# Patient Record
Sex: FEMALE | Race: WHITE | NOT HISPANIC OR LATINO | Employment: OTHER | URBAN - METROPOLITAN AREA
[De-identification: names, ages, dates, MRNs, and addresses within clinical notes are randomized per-mention and may not be internally consistent; named-entity substitution may affect disease eponyms.]

---

## 2017-03-24 ENCOUNTER — OFFICE VISIT (OUTPATIENT)
Dept: FAMILY MEDICINE | Facility: CLINIC | Age: 62
End: 2017-03-24
Payer: COMMERCIAL

## 2017-03-24 ENCOUNTER — RADIANT APPOINTMENT (OUTPATIENT)
Dept: GENERAL RADIOLOGY | Facility: CLINIC | Age: 62
End: 2017-03-24
Attending: FAMILY MEDICINE
Payer: COMMERCIAL

## 2017-03-24 VITALS
SYSTOLIC BLOOD PRESSURE: 136 MMHG | HEART RATE: 68 BPM | BODY MASS INDEX: 36.89 KG/M2 | HEIGHT: 63 IN | WEIGHT: 208.2 LBS | TEMPERATURE: 97 F | DIASTOLIC BLOOD PRESSURE: 94 MMHG

## 2017-03-24 DIAGNOSIS — R73.09 ABNORMAL GLUCOSE: ICD-10-CM

## 2017-03-24 DIAGNOSIS — M25.551 HIP PAIN, RIGHT: Primary | ICD-10-CM

## 2017-03-24 DIAGNOSIS — I10 ESSENTIAL HYPERTENSION WITH GOAL BLOOD PRESSURE LESS THAN 140/90: ICD-10-CM

## 2017-03-24 DIAGNOSIS — M25.551 HIP PAIN, RIGHT: ICD-10-CM

## 2017-03-24 DIAGNOSIS — E78.5 HYPERLIPIDEMIA LDL GOAL <130: ICD-10-CM

## 2017-03-24 PROCEDURE — 73502 X-RAY EXAM HIP UNI 2-3 VIEWS: CPT

## 2017-03-24 PROCEDURE — 99203 OFFICE O/P NEW LOW 30 MIN: CPT | Performed by: FAMILY MEDICINE

## 2017-03-24 RX ORDER — HYDROCHLOROTHIAZIDE 25 MG/1
TABLET ORAL
COMMUNITY
Start: 2017-03-08 | End: 2018-05-17

## 2017-03-24 RX ORDER — INFLUENZA VIRUS VACCINE 15; 15; 15; 15 UG/.5ML; UG/.5ML; UG/.5ML; UG/.5ML
SUSPENSION INTRAMUSCULAR
Refills: 0 | COMMUNITY
Start: 2016-11-05 | End: 2018-05-17

## 2017-03-24 RX ORDER — SERTRALINE HYDROCHLORIDE 100 MG/1
300 TABLET, FILM COATED ORAL
COMMUNITY
Start: 2016-02-19 | End: 2017-05-26

## 2017-03-24 RX ORDER — SIMVASTATIN 40 MG
TABLET ORAL
COMMUNITY
Start: 2017-03-13 | End: 2018-05-17

## 2017-03-24 NOTE — MR AVS SNAPSHOT
After Visit Summary   3/24/2017    Dee Dee Shepherd    MRN: [id number]           Patient Information     Date Of Birth          1955        Visit Information        Provider Department      3/24/2017 3:20 PM Dalia Wu DO [facility]        Today's Diagnoses     Hip pain, right    -  1    Essential hypertension with goal blood pressure less than 140/90        Hyperlipidemia LDL goal <130        Abnormal glucose          Care Instructions    I suspect you have 2 separate hip problems, the trochanteric bursitis on the outside and the arthritis in the groin.  Please check out the exercises for the bursitis.  You can also take some tylenol as needed for pain.  Make sure to not take more then 3000mg of tylenol in a 24 hour period (your Excedrin PM has some tylenol in it too)    If you feel things are not improving over the next few weeks and you are ready to take another step just let me know and we can get you in with sports medicine to consider an injection.    Please plan on coming back for blood work and a blood pressure check.  You will need to be fasting for 12 hours (nothing but water) prior to your blood work.  I'll let you know your results through the [facility]    Nice meeting you today!        Follow-ups after your visit        Future tests that were ordered for you today     Open Future Orders        Priority Expected Expires Ordered    Hemoglobin A1c Routine 3/27/2017 3/24/2018 3/24/2017    Comprehensive metabolic panel Routine 3/27/2017 3/24/2018 3/24/2017    Lipid Profile with reflex to direct LDL Routine 3/27/2017 3/24/2018 3/24/2017            Who to contact     Normal or non-critical lab and imaging results will be communicated to you by MyChart, letter or phone within 4 business days after the clinic has received the results. If you do not hear from us within 7 days, please contact the clinic through [facility] or phone. If you have a critical or abnormal lab  "result, we will notify you by phone as soon as possible.  Submit refill requests through [facility] or call your pharmacy and they will forward the refill request to us. Please allow 3 business days for your refill to be completed.          If you need to speak with a  for additional information , please call: [phone]           Additional Information About Your Visit        [facility] Information     [facility] gives you secure access to your electronic health record. If you see a primary care provider, you can also send messages to your care team and make appointments. If you have questions, please call your primary care clinic.  If you do not have a primary care provider, please call [phone] and they will assist you.        Care EveryWhere ID     This is your Care EveryWhere ID. This could be used by other organizations to access your [facility] medical records  [id number]        Your Vitals Were     Pulse Temperature Height BMI (Body Mass Index)          68 97  F (36.1  C) (Tympanic) 5' 3.19\" (1.605 m) 36.66 kg/m2         Blood Pressure from Last 3 Encounters:   03/24/17 (!) 136/94    Weight from Last 3 Encounters:   03/24/17 208 lb 3.2 oz (94.4 kg)               Primary Care Provider    None Specified       No primary provider on file.        Thank you!     Thank you for choosing [facility]  for your care. Our goal is always to provide you with excellent care. Hearing back from our patients is one way we can continue to improve our services. Please take a few minutes to complete the written survey that you may receive in the mail after your visit with us. Thank you!             Your Updated Medication List - Protect others around you: Learn how to safely use, store and throw away your medicines at www.disposemymeds.org.          This list is accurate as of: 3/24/17  4:25 PM.  Always use your most recent med list.                   Brand Name Dispense Instructions for use    " Diphenhydramine-APAP (sleep)  MG Tabs          FLUARIX QUADRIVALENT 0.5 ML injection   Generic drug:  influenza quadrivalent (PF) vacc age 3 yrs and older      TO BE ADMINISTERED BY PHARMACIST FOR IMMUNIZATION       hydrochlorothiazide 25 MG tablet    HYDRODIURIL     TAKE 1 TABLET DAILY (EVERY 24 HOURS)       sertraline 100 MG tablet    ZOLOFT     Take 300 mg by mouth       simvastatin 40 MG tablet    ZOCOR     TAKE 1 TABLET NIGHTLY

## 2017-03-24 NOTE — PROGRESS NOTES
SUBJECTIVE:                                                    Dee Dee Shepherd is a 61 year old female who presents to clinic today for the following health issues:    New Patient/Transfer of Care    Joint Pain     Onset: 6 months or more    Description:   Location: right hip  Character: Sharp    Intensity: moderate    Progression of Symptoms: same    Accompanying Signs & Symptoms:  Other symptoms: only occurs with movement   History:   Previous similar pain: no       Precipitating factors:   Trauma or overuse: no     Alleviating factors:  Improved by: rest/inactivity       Therapies Tried and outcome: None    Tender in the lateral hip.  Hurts to get out of car, roll over in bed, and with sit to stand transitions.   Has some gorin pain as well.  Feels stiff when she gets up to walk around.  No injury to her hip, no falls.  No prior hip problems  No pain below the knee.  No significant back pain.    [facility] at [facility], her doctor left.  Looking to transfer care.    Has a h/o HTN and has had elevated blood sugars in the past.          Problem list and histories reviewed & adjusted, as indicated.  Additional history: none      Reviewed and updated as needed this visit by clinical staff  Tobacco  Allergies  Meds  Med Hx  Surg Hx  Fam Hx  Soc Hx         Reviewed and updated as needed this visit by Provider  Tobacco  Med Hx  Surg Hx  Fam Hx  Soc Hx        ROS: Remainder of Constitutional, CV, Respiratory, GI,  negative with exception of that mentioned above    PE:  VS as above   Gen:  WN/WD/WH female in NAD   Heart:  RRR without murmur, nl S1, S2, no rubs or gallops   Lungs CTA ella without rales/ronchi/wheezes   Ext:  No pedal edema   MSK:  R hip with decreased ROM in all planes.  Groin and lateral pain on end range flexion, tender on palpation over the greater trochanter    X-ray hip:  No fracture per my visualization, positive for arthritis changes.    A/P:      ICD-10-CM    1. Hip pain,  right M25.551 CANCELED: XR Pelvis 1/2 Views   2. Essential hypertension with goal blood pressure less than 140/90 I10 Comprehensive metabolic panel   3. Hyperlipidemia LDL goal <130 E78.5 Comprehensive metabolic panel     Lipid Profile with reflex to direct LDL   4. Abnormal glucose R73.09 Hemoglobin A1c     Offered PT, pt prefers home exercises.  Reviewed 2 different hip problems, pt seems most bothered by lateral hip pain at this point    Reviewed need for BP check, pt will stop into pharmacy for this in the next week or so.    Patient Instructions   I suspect you have 2 separate hip problems, the trochanteric bursitis on the outside and the arthritis in the groin.  Please check out the exercises for the bursitis.  You can also take some tylenol as needed for pain.  Make sure to not take more then 3000mg of tylenol in a 24 hour period (your Excedrin PM has some tylenol in it too)    If you feel things are not improving over the next few weeks and you are ready to take another step just let me know and we can get you in with sports medicine to consider an injection.    Please plan on coming back for blood work and a blood pressure check.  You will need to be fasting for 12 hours (nothing but water) prior to your blood work.  I'll let you know your results through the [facility]    Nice meeting you today!

## 2017-03-24 NOTE — NURSING NOTE
"Chief Complaint   Patient presents with     Musculoskeletal Problem     right leg       Initial BP (!) 136/94 (BP Location: Left arm, Cuff Size: Adult Regular)  Pulse 68  Temp 97  F (36.1  C) (Tympanic)  Ht 5' 3.19\" (1.605 m)  Wt 208 lb 3.2 oz (94.4 kg)  BMI 36.66 kg/m2 Estimated body mass index is 36.66 kg/(m^2) as calculated from the following:    Height as of this encounter: 5' 3.19\" (1.605 m).    Weight as of this encounter: 208 lb 3.2 oz (94.4 kg).  Medication Reconciliation: complete     Arlet Caicedo CMA       "

## 2017-03-24 NOTE — PATIENT INSTRUCTIONS
I suspect you have 2 separate hip problems, the trochanteric bursitis on the outside and the arthritis in the groin.  Please check out the exercises for the bursitis.  You can also take some tylenol as needed for pain.  Make sure to not take more then 3000mg of tylenol in a 24 hour period (your Excedrin PM has some tylenol in it too)    If you feel things are not improving over the next few weeks and you are ready to take another step just let me know and we can get you in with sports medicine to consider an injection.    Please plan on coming back for blood work and a blood pressure check.  You will need to be fasting for 12 hours (nothing but water) prior to your blood work.  I'll let you know your results through the [facility]    Nice meeting you today!

## 2017-05-24 DIAGNOSIS — R73.09 ABNORMAL GLUCOSE: ICD-10-CM

## 2017-05-24 DIAGNOSIS — I10 ESSENTIAL HYPERTENSION WITH GOAL BLOOD PRESSURE LESS THAN 140/90: ICD-10-CM

## 2017-05-24 DIAGNOSIS — E78.5 HYPERLIPIDEMIA LDL GOAL <130: ICD-10-CM

## 2017-05-24 LAB
ALBUMIN SERPL-MCNC: 3.7 G/DL (ref 3.4–5)
ALP SERPL-CCNC: 114 U/L (ref 40–150)
ALT SERPL W P-5'-P-CCNC: 19 U/L (ref 0–50)
ANION GAP SERPL CALCULATED.3IONS-SCNC: 9 MMOL/L (ref 3–14)
AST SERPL W P-5'-P-CCNC: 18 U/L (ref 0–45)
BILIRUB SERPL-MCNC: 0.3 MG/DL (ref 0.2–1.3)
BUN SERPL-MCNC: 17 MG/DL (ref 7–30)
CALCIUM SERPL-MCNC: 9.1 MG/DL (ref 8.5–10.1)
CHLORIDE SERPL-SCNC: 104 MMOL/L (ref 94–109)
CHOLEST SERPL-MCNC: 168 MG/DL
CO2 SERPL-SCNC: 27 MMOL/L (ref 20–32)
CREAT SERPL-MCNC: 0.73 MG/DL (ref 0.52–1.04)
GFR SERPL CREATININE-BSD FRML MDRD: 81 ML/MIN/1.7M2
GLUCOSE SERPL-MCNC: 120 MG/DL (ref 70–99)
HBA1C MFR BLD: 5.6 % (ref 4.3–6)
HDLC SERPL-MCNC: 50 MG/DL
LDLC SERPL CALC-MCNC: 95 MG/DL
NONHDLC SERPL-MCNC: 118 MG/DL
POTASSIUM SERPL-SCNC: 3.4 MMOL/L (ref 3.4–5.3)
PROT SERPL-MCNC: 7 G/DL (ref 6.8–8.8)
SODIUM SERPL-SCNC: 140 MMOL/L (ref 133–144)
TRIGL SERPL-MCNC: 114 MG/DL

## 2017-05-24 PROCEDURE — 80061 LIPID PANEL: CPT | Performed by: FAMILY MEDICINE

## 2017-05-24 PROCEDURE — 80053 COMPREHEN METABOLIC PANEL: CPT | Performed by: FAMILY MEDICINE

## 2017-05-24 PROCEDURE — 83036 HEMOGLOBIN GLYCOSYLATED A1C: CPT | Performed by: FAMILY MEDICINE

## 2017-05-24 PROCEDURE — 36415 COLL VENOUS BLD VENIPUNCTURE: CPT | Performed by: FAMILY MEDICINE

## 2017-05-26 ENCOUNTER — TELEPHONE (OUTPATIENT)
Dept: FAMILY MEDICINE | Facility: CLINIC | Age: 62
End: 2017-05-26

## 2017-05-26 DIAGNOSIS — F32.5 MAJOR DEPRESSION IN COMPLETE REMISSION (H): Primary | ICD-10-CM

## 2017-05-26 RX ORDER — SERTRALINE HYDROCHLORIDE 100 MG/1
300 TABLET, FILM COATED ORAL DAILY
Qty: 270 TABLET | Refills: 1 | Status: SHIPPED | OUTPATIENT
Start: 2017-05-26 | End: 2017-07-11

## 2017-05-26 NOTE — TELEPHONE ENCOUNTER
Patient came into clinic for a refill on her zoloft.  She did her blood work and would like to know if she could get her refill today because she is out.  Please send to [facility].    Thank you  Janice Walden, [facility]

## 2017-06-17 ENCOUNTER — HEALTH MAINTENANCE LETTER (OUTPATIENT)
Age: 62
End: 2017-06-17

## 2017-07-07 ENCOUNTER — TELEPHONE (OUTPATIENT)
Dept: FAMILY MEDICINE | Facility: CLINIC | Age: 62
End: 2017-07-07

## 2017-07-07 DIAGNOSIS — F32.5 MAJOR DEPRESSION IN COMPLETE REMISSION (H): ICD-10-CM

## 2017-07-07 NOTE — TELEPHONE ENCOUNTER
Pt called requesting refill of Sertraline sent to  pharmacy. Please triage    Serena Frye, [place]

## 2017-07-11 RX ORDER — SERTRALINE HYDROCHLORIDE 100 MG/1
300 TABLET, FILM COATED ORAL DAILY
Qty: 270 TABLET | Refills: 0 | Status: SHIPPED | OUTPATIENT
Start: 2017-07-11 | End: 2017-10-20

## 2017-07-11 NOTE — TELEPHONE ENCOUNTER
Prescription approved per [facility] Refill Protocol. But needs PHQ9 Rachel Andino RN

## 2017-10-20 DIAGNOSIS — F32.5 MAJOR DEPRESSION IN COMPLETE REMISSION (H): ICD-10-CM

## 2017-10-20 NOTE — LETTER
My Depression Action Plan  Name: Dee Dee Shepherd   Date of Birth 1955  Date: 10/30/2017    My doctor: Dalia Wu   My clinic: [address] [address] [phone]          GREEN    ZONE   Good Control    What it looks like:     Things are going generally well. You have normal up s and down s. You may even feel depressed from time to time, but bad moods usually last less than a day.   What you need to do:  1. Continue to care for yourself (see self care plan)  2. Check your depression survival kit and update it as needed  3. Follow your physician s recommendations including any medication.  4. Do not stop taking medication unless you consult with your physician first.           YELLOW         ZONE Getting Worse    What it looks like:     Depression is starting to interfere with your life.     It may be hard to get out of bed; you may be starting to isolate yourself from others.    Symptoms of depression are starting to last most all day and this has happened for several days.     You may have suicidal thoughts but they are not constant.   What you need to do:     1. Call your care team, your response to treatment will improve if you keep your care team informed of your progress. Yellow periods are signs an adjustment may need to be made.     2. Continue your self-care, even if you have to fake it!    3. Talk to someone in your support network    4. Open up your depression survival kit           RED    ZONE Medical Alert - Get Help    What it looks like:     Depression is seriously interfering with your life.     You may experience these or other symptoms: You can t get out of bed most days, can t work or engage in other necessary activities, you have trouble taking care of basic hygiene, or basic responsibilities, thoughts of suicide or death that will not go away, self-injurious behavior.     What you need to do:  1. Call your care team  and request a same-day appointment. If they are not available (weekends or after hours) call your local crisis line, emergency room or 911.      Electronically signed by: Ludy Trevino, October 30, 2017    Depression Self Care Plan / Survival Kit    Self-Care for Depression  Here s the deal. Your body and mind are really not as separate as most people think.  What you do and think affects how you feel and how you feel influences what you do and think. This means if you do things that people who feel good do, it will help you feel better.  Sometimes this is all it takes.  There is also a place for medication and therapy depending on how severe your depression is, so be sure to consult with your medical provider and/ or Behavioral Health Consultant if your symptoms are worsening or not improving.     In order to better manage my stress, I will:    Exercise  Get some form of exercise, every day. This will help reduce pain and release endorphins, the  feel good  chemicals in your brain. This is almost as good as taking antidepressants!  This is not the same as joining a gym and then never going! (they count on that by the way ) It can be as simple as just going for a walk or doing some gardening, anything that will get you moving.      Hygiene   Maintain good hygiene (Get out of bed in the morning, Make your bed, Brush your teeth, Take a shower, and Get dressed like you were going to work, even if you are unemployed).  If your clothes don't fit try to get ones that do.    Diet  I will strive to eat foods that are good for me, drink plenty of water, and avoid excessive sugar, caffeine, alcohol, and other mood-altering substances.  Some foods that are helpful in depression are: complex carbohydrates, B vitamins, flaxseed, fish or fish oil, fresh fruits and vegetables.    Psychotherapy  I agree to participate in Individual Therapy (if recommended).    Medication  If prescribed medications, I agree to take them.  Missing  doses can result in serious side effects.  I understand that drinking alcohol, or other illicit drug use, may cause potential side effects.  I will not stop my medication abruptly without first discussing it with my provider.    Staying Connected With Others  I will stay in touch with my friends, family members, and my primary care provider/team.    Use your imagination  Be creative.  We all have a creative side; it doesn t matter if it s oil painting, sand castles, or mud pies! This will also kick up the endorphins.    Witness Beauty  (AKA stop and smell the roses) Take a look outside, even in mid-winter. Notice colors, textures. Watch the squirrels and birds.     Service to others  Be of service to others.  There is always someone else in need.  By helping others we can  get out of ourselves  and remember the really important things.  This also provides opportunities for practicing all the other parts of the program.    Humor  Laugh and be silly!  Adjust your TV habits for less news and crime-drama and more comedy.    Control your stress  Try breathing deep, massage therapy, biofeedback, and meditation. Find time to relax each day.     My support system    Clinic Contact:  Phone number:    Contact 1:  Phone number:    Contact 2:  Phone number:    [employer]/:  Phone number:    Therapist:  Phone number:    Local crisis center:    Phone number:    Other community support:  Phone number:

## 2017-10-20 NOTE — TELEPHONE ENCOUNTER
SERTRALINE     Last Written Prescription Date: 07/11/17  Last Fill Quantity: 270, # refills: 0  Last Office Visit with [facility] primary care provider:  03/24/17 ERICK        Last PHQ-9 score on record= No flowsheet data found.

## 2017-10-27 RX ORDER — SERTRALINE HYDROCHLORIDE 100 MG/1
300 TABLET, FILM COATED ORAL DAILY
Qty: 90 TABLET | Refills: 0 | Status: SHIPPED | OUTPATIENT
Start: 2017-10-27 | End: 2017-11-28

## 2017-10-27 NOTE — TELEPHONE ENCOUNTER
Routing refill request to provider for review/approval because:  Patient needs to be seen because:  Overdue for 6 month Depression office visit and PHQ-9.   Sent patient PHQ-9 via My Chart and asked her to please schedule an office visit ASAP.    Radha Catalan RN

## 2017-11-28 DIAGNOSIS — F32.5 MAJOR DEPRESSION IN COMPLETE REMISSION (H): ICD-10-CM

## 2017-11-29 NOTE — TELEPHONE ENCOUNTER
Routing refill request to provider for review/approval because:  Due for PHQ-9. Sent to patient via E Mail. Rachel Andino RN

## 2017-11-30 RX ORDER — SERTRALINE HYDROCHLORIDE 100 MG/1
TABLET, FILM COATED ORAL
Qty: 90 TABLET | Refills: 0 | Status: SHIPPED | OUTPATIENT
Start: 2017-11-30 | End: 2018-05-17

## 2018-05-14 ENCOUNTER — TELEPHONE (OUTPATIENT)
Dept: LAB | Facility: CLINIC | Age: 63
End: 2018-05-14

## 2018-05-14 DIAGNOSIS — E78.5 HYPERLIPIDEMIA LDL GOAL <130: ICD-10-CM

## 2018-05-14 DIAGNOSIS — R73.09 ABNORMAL GLUCOSE: ICD-10-CM

## 2018-05-14 DIAGNOSIS — I10 ESSENTIAL HYPERTENSION WITH GOAL BLOOD PRESSURE LESS THAN 140/90: Primary | ICD-10-CM

## 2018-05-14 NOTE — TELEPHONE ENCOUNTER
Patient coming in for lab work on Tuesday, 5/15/18.     Patient will be fasting.           Please place future orders. Thanks

## 2018-05-15 DIAGNOSIS — R73.09 ABNORMAL GLUCOSE: ICD-10-CM

## 2018-05-15 DIAGNOSIS — E78.5 HYPERLIPIDEMIA LDL GOAL <130: ICD-10-CM

## 2018-05-15 DIAGNOSIS — I10 ESSENTIAL HYPERTENSION WITH GOAL BLOOD PRESSURE LESS THAN 140/90: ICD-10-CM

## 2018-05-15 LAB
ALBUMIN SERPL-MCNC: 3.7 G/DL (ref 3.4–5)
ALP SERPL-CCNC: 119 U/L (ref 40–150)
ALT SERPL W P-5'-P-CCNC: 26 U/L (ref 0–50)
ANION GAP SERPL CALCULATED.3IONS-SCNC: 7 MMOL/L (ref 3–14)
AST SERPL W P-5'-P-CCNC: 19 U/L (ref 0–45)
BILIRUB SERPL-MCNC: 0.3 MG/DL (ref 0.2–1.3)
BUN SERPL-MCNC: 17 MG/DL (ref 7–30)
CALCIUM SERPL-MCNC: 8.6 MG/DL (ref 8.5–10.1)
CHLORIDE SERPL-SCNC: 107 MMOL/L (ref 94–109)
CHOLEST SERPL-MCNC: 229 MG/DL
CO2 SERPL-SCNC: 25 MMOL/L (ref 20–32)
CREAT SERPL-MCNC: 0.74 MG/DL (ref 0.52–1.04)
GFR SERPL CREATININE-BSD FRML MDRD: 78 ML/MIN/1.7M2
GLUCOSE SERPL-MCNC: 116 MG/DL (ref 70–99)
HBA1C MFR BLD: 5.9 % (ref 0–5.6)
HDLC SERPL-MCNC: 54 MG/DL
LDLC SERPL CALC-MCNC: 153 MG/DL
NONHDLC SERPL-MCNC: 175 MG/DL
POTASSIUM SERPL-SCNC: 3.9 MMOL/L (ref 3.4–5.3)
PROT SERPL-MCNC: 7.2 G/DL (ref 6.8–8.8)
SODIUM SERPL-SCNC: 139 MMOL/L (ref 133–144)
TRIGL SERPL-MCNC: 110 MG/DL

## 2018-05-15 PROCEDURE — 83036 HEMOGLOBIN GLYCOSYLATED A1C: CPT | Performed by: FAMILY MEDICINE

## 2018-05-15 PROCEDURE — 80061 LIPID PANEL: CPT | Performed by: FAMILY MEDICINE

## 2018-05-15 PROCEDURE — 80053 COMPREHEN METABOLIC PANEL: CPT | Performed by: FAMILY MEDICINE

## 2018-05-15 PROCEDURE — 36415 COLL VENOUS BLD VENIPUNCTURE: CPT | Performed by: FAMILY MEDICINE

## 2018-05-17 ENCOUNTER — OFFICE VISIT (OUTPATIENT)
Dept: FAMILY MEDICINE | Facility: CLINIC | Age: 63
End: 2018-05-17
Payer: COMMERCIAL

## 2018-05-17 VITALS
DIASTOLIC BLOOD PRESSURE: 92 MMHG | HEART RATE: 92 BPM | TEMPERATURE: 97 F | WEIGHT: 200.8 LBS | SYSTOLIC BLOOD PRESSURE: 142 MMHG | HEIGHT: 63 IN | BODY MASS INDEX: 35.58 KG/M2

## 2018-05-17 DIAGNOSIS — Z12.31 ENCOUNTER FOR SCREENING MAMMOGRAM FOR BREAST CANCER: ICD-10-CM

## 2018-05-17 DIAGNOSIS — Z11.51 SCREENING FOR HUMAN PAPILLOMAVIRUS: ICD-10-CM

## 2018-05-17 DIAGNOSIS — E78.5 HYPERLIPIDEMIA LDL GOAL <130: ICD-10-CM

## 2018-05-17 DIAGNOSIS — Z01.419 ENCOUNTER FOR GYNECOLOGICAL EXAMINATION WITHOUT ABNORMAL FINDING: Primary | ICD-10-CM

## 2018-05-17 DIAGNOSIS — Z85.3 PERSONAL HISTORY OF MALIGNANT NEOPLASM OF BREAST: ICD-10-CM

## 2018-05-17 DIAGNOSIS — I10 ESSENTIAL HYPERTENSION WITH GOAL BLOOD PRESSURE LESS THAN 140/90: ICD-10-CM

## 2018-05-17 DIAGNOSIS — F32.5 MAJOR DEPRESSION IN COMPLETE REMISSION (H): ICD-10-CM

## 2018-05-17 DIAGNOSIS — Z12.4 SCREENING FOR CERVICAL CANCER: ICD-10-CM

## 2018-05-17 DIAGNOSIS — M25.551 HIP PAIN, RIGHT: ICD-10-CM

## 2018-05-17 PROBLEM — M16.11 PRIMARY OSTEOARTHRITIS OF RIGHT HIP: Status: ACTIVE | Noted: 2018-05-17

## 2018-05-17 PROCEDURE — 87624 HPV HI-RISK TYP POOLED RSLT: CPT | Performed by: FAMILY MEDICINE

## 2018-05-17 PROCEDURE — G0145 SCR C/V CYTO,THINLAYER,RESCR: HCPCS | Performed by: FAMILY MEDICINE

## 2018-05-17 PROCEDURE — 99396 PREV VISIT EST AGE 40-64: CPT | Performed by: FAMILY MEDICINE

## 2018-05-17 RX ORDER — SIMVASTATIN 40 MG
TABLET ORAL
Qty: 90 TABLET | Refills: 3 | Status: SHIPPED | OUTPATIENT
Start: 2018-05-17 | End: 2019-05-28

## 2018-05-17 RX ORDER — SERTRALINE HYDROCHLORIDE 100 MG/1
TABLET, FILM COATED ORAL
Qty: 270 TABLET | Refills: 1 | Status: SHIPPED | OUTPATIENT
Start: 2018-05-17 | End: 2019-01-18

## 2018-05-17 RX ORDER — HYDROCHLOROTHIAZIDE 25 MG/1
TABLET ORAL
Qty: 90 TABLET | Refills: 3 | Status: SHIPPED | OUTPATIENT
Start: 2018-05-17 | End: 2019-05-30

## 2018-05-17 NOTE — PROGRESS NOTES
SUBJECTIVE:   CC: Dee Dee Shepherd is an 62 year old woman who presents for preventive health visit.     Answers for HPI/ROS submitted by the patient on 5/17/2018   Annual Exam:  Getting at least 3 servings of Calcium per day:: Yes  Bi-annual eye exam:: Yes  Dental care twice a year:: Yes  Sleep apnea or symptoms of sleep apnea:: Sleep apnea  Diet:: Regular (no restrictions)  Frequency of exercise:: 1 day/week  Taking medications regularly:: Yes  Medication side effects:: None  PHQ-2 Score: 2  Duration of exercise:: 15-30 minutes    Concerns:  * right hip pain for 1 year.  Ongoing pain in the groin and lateral thigh.  Stiff.  Hurts when she rolls over in bed.  Did the exercises but never got any relief.  Ready to take the next step.    Has not been taking her BP or cholesterol medication regularly.  Notes she occasionally misses her zoloft as well.    Feels mood is fine.  Tried addition of wellbutrin in the past with no improvement noted.  Not interested in considering a change        Today's PHQ-2 Score:   PHQ-2 ( 1999 Pf[employer]) 5/17/2018 3/24/2017   Q1: Little interest or pleasure in doing things 1 1   Q2: Feeling down, depressed or hopeless 1 1   PHQ-2 Score 2 2   Q1: Little interest or pleasure in doing things Several days -   Q2: Feeling down, depressed or hopeless Several days -   PHQ-2 Score 2 -       Abuse: Current or Past(Physical, Sexual or Emotional)- No  Do you feel safe in your environment - Yes    Social History   Substance Use Topics     Smoking status: Current Every Day Smoker     Packs/day: 0.50     Years: 35.00     Types: Cigarettes     Smokeless tobacco: Never Used     Alcohol use Yes     If you drink alcohol do you typically have >3 drinks per day or >7 drinks per week? No                     Reviewed orders with patient.  Reviewed health maintenance and updated orders accordingly - Yes    Patient over age 50, mutual decision to screen reflected in health maintenance.    Pertinent mammograms  "are reviewed under the imaging tab.  History of abnormal Pap smear: NO - age 30- 65 PAP every 3 years recommended    Reviewed and updated as needed this visit by clinical staff  Tobacco  Allergies  Meds  Med Hx  Surg Hx  Fam Hx  Soc Hx        Reviewed and updated as needed this visit by Provider  Tobacco  Med Hx  Surg Hx  Fam Hx  Soc Hx       History reviewed. No pertinent past medical history.   History reviewed. No pertinent surgical history.    ROS:  CONSTITUTIONAL: NEGATIVE for fever, chills, change in weight  INTEGUMENTARY/SKIN: NEGATIVE for worrisome rashes, moles or lesions  EYES: NEGATIVE for vision changes or irritation  ENT: NEGATIVE for ear, mouth and throat problems  RESP: NEGATIVE for significant cough or SOB  BREAST: NEGATIVE for masses, tenderness or discharge  CV: NEGATIVE for chest pain, palpitations or peripheral edema  GI: NEGATIVE for nausea, abdominal pain, heartburn, or change in bowel habits  : NEGATIVE for unusual urinary or vaginal symptoms. No vaginal bleeding.  MUSCULOSKELETAL:as above  NEURO: NEGATIVE for weakness, dizziness or paresthesias  PSYCHIATRIC: NEGATIVE for changes in mood or affect     OBJECTIVE:   BP (!) 142/92  Pulse 92  Temp 97  F (36.1  C) (Tympanic)  Ht 5' 3\" (1.6 m)  Wt 200 lb 12.8 oz (91.1 kg)  Breastfeeding? No  BMI 35.57 kg/m2  EXAM:  GENERAL APPEARANCE: healthy, alert and no distress  EYES: Eyes grossly normal to inspection, PERRL and conjunctivae and sclerae normal  HENT: ear canals and TM's normal, nose and mouth without ulcers or lesions, oropharynx clear and oral mucous membranes moist  NECK: no adenopathy, no asymmetry, masses, or scars and thyroid normal to palpation  RESP: lungs clear to auscultation - no rales, rhonchi or wheezes  BREAST: normal without masses, tenderness or nipple discharge and no palpable axillary masses or adenopathy  CV: regular rate and rhythm, normal S1 S2, no S3 or S4, no murmur, click or rub, no peripheral edema and " peripheral pulses strong  ABDOMEN: soft, nontender, no hepatosplenomegaly, no masses and bowel sounds normal   (female): normal female external genitalia, normal urethral meatus, vaginal mucosal atrophy noted, normal cervix, adnexae, and uterus without masses or abnormal discharge.  Bimanual exam limited by body habitus  MS: no musculoskeletal defects are noted and gait is age appropriate without ataxia  NEURO: Normal strength and tone, sensory exam grossly normal, mentation intact and speech normal  PSYCH: mentation appears normal and affect normal/bright    ASSESSMENT/PLAN:       ICD-10-CM    1. Encounter for gynecological examination without abnormal finding Z01.419    2. Hip pain, right M25.551 ORTHO  REFERRAL   3. Essential hypertension with goal blood pressure less than 140/90 I10 hydrochlorothiazide (HYDRODIURIL) 25 MG tablet   4. Hyperlipidemia LDL goal <130 E78.5 simvastatin (ZOCOR) 40 MG tablet   5. Major depression in complete remission (H) F32.5 sertraline (ZOLOFT) 100 MG tablet   6. Encounter for screening mammogram for breast cancer Z12.31 MA Screening Digital Bilateral   7. Screening for cervical cancer Z12.4 Pap imaged thin layer screen with HPV - recommended age 30 - 65 years (select HPV order below)   8. Screening for human papillomavirus Z11.51 HPV High Risk Types DNA Cervical     No changes in BP meds or statin at this time as pt has not been taking her medication regularly.  Recheck as below  COUNSELING:   Reviewed preventive health counseling, as reflected in patient instructions  Special attention given to:        Regular exercise       Healthy diet/nutrition       Osteoporosis Prevention/Bone Health       Colon cancer screening       (Carissa)menopause management         reports that she has been smoking Cigarettes.  She has a 17.50 pack-year smoking history. She has never used smokeless tobacco.  Tobacco Cessation Action Plan: Information offered: Patient not interested at this  "time  Estimated body mass index is 35.57 kg/(m^2) as calculated from the following:    Height as of this encounter: 5' 3\" (1.6 m).    Weight as of this encounter: 200 lb 12.8 oz (91.1 kg).   Weight management plan: Discussed healthy diet and exercise guidelines and patient will follow up in 12 months in clinic to re-evaluate.    Counseling Resources:  ATP IV Guidelines  Pooled Cohorts Equation Calculator  Breast Cancer Risk Calculator  FRAX Risk Assessment  ICSI Preventive Guidelines  Dietary Guidelines for Americans, 2010  USDA's MyPlate  ASA Prophylaxis  Lung CA Screening    Dalia Wu, DO  [facility]    Patient Instructions   Please stop back to clinic in 1-2 weeks for a blood pressure check.  This can be with the pharmacy if you would like.    You can call [phone] to schedule the mammogram.  Make sure to request your previous mammogram films from [facility] too.    You should be getting a call to schedule with the sports medicine docs about you hip    Preventive Health Recommendations  Female Ages 50 - 64    Yearly exam: See your health care provider every year in order to  o Review health changes.   o Discuss preventive care.    o Review your medicines if your doctor has prescribed any.      Get a Pap test every three years (unless you have an abnormal result and your provider advises testing more often).    If you get Pap tests with HPV test, you only need to test every 5 years, unless you have an abnormal result.     You do not need a Pap test if your uterus was removed (hysterectomy) and you have not had cancer.    You should be tested each year for STDs (sexually transmitted diseases) if you're at risk.     Have a mammogram every 1 to 2 years.    Have a colonoscopy at age 50, or have a yearly FIT test (stool test). These exams screen for colon cancer.      Have a cholesterol test every 5 years, or more often if advised.    Have a diabetes test (fasting glucose) every three " years. If you are at risk for diabetes, you should have this test more often.     If you are at risk for osteoporosis (brittle bone disease), think about having a bone density scan (DEXA).    Shots: Get a flu shot each year. Get a tetanus shot every 10 years.    Nutrition:     Eat at least 5 servings of fruits and vegetables each day.    Eat whole-grain bread, whole-wheat pasta and brown rice instead of white grains and rice.    Talk to your provider about Calcium and Vitamin D.     Lifestyle    Exercise at least 150 minutes a week (30 minutes a day, 5 days a week). This will help you control your weight and prevent disease.    Limit alcohol to one drink per day.    No smoking.     Wear sunscreen to prevent skin cancer.     See your dentist every six months for an exam and cleaning.    See your eye doctor every 1 to 2 years.

## 2018-05-17 NOTE — MR AVS SNAPSHOT
After Visit Summary   5/17/2018    Dee Dee Shepherd    MRN: [id number]           Patient Information     Date Of Birth          1955        Visit Information        Provider Department      5/17/2018 3:00 PM Dalia Wu DO [facility]        Today's Diagnoses     Hip pain, right    -  1    Major depression in complete remission (H)        Encounter for gynecological examination without abnormal finding        Encounter for screening mammogram for breast cancer        Hyperlipidemia LDL goal <130        Essential hypertension with goal blood pressure less than 140/90          Care Instructions    Please stop back to clinic in 1-2 weeks for a blood pressure check.  This can be with the pharmacy if you would like.    You can call [phone] to schedule the mammogram.  Make sure to request your previous mammogram films from [facility] too.    You should be getting a call to schedule with the sports medicine docs about you hip    Preventive Health Recommendations  Female Ages 50 - 64    Yearly exam: See your health care provider every year in order to  o Review health changes.   o Discuss preventive care.    o Review your medicines if your doctor has prescribed any.      Get a Pap test every three years (unless you have an abnormal result and your provider advises testing more often).    If you get Pap tests with HPV test, you only need to test every 5 years, unless you have an abnormal result.     You do not need a Pap test if your uterus was removed (hysterectomy) and you have not had cancer.    You should be tested each year for STDs (sexually transmitted diseases) if you're at risk.     Have a mammogram every 1 to 2 years.    Have a colonoscopy at age 50, or have a yearly FIT test (stool test). These exams screen for colon cancer.      Have a cholesterol test every 5 years, or more often if advised.    Have a diabetes test (fasting glucose) every three years. If you  are at risk for diabetes, you should have this test more often.     If you are at risk for osteoporosis (brittle bone disease), think about having a bone density scan (DEXA).    Shots: Get a flu shot each year. Get a tetanus shot every 10 years.    Nutrition:     Eat at least 5 servings of fruits and vegetables each day.    Eat whole-grain bread, whole-wheat pasta and brown rice instead of white grains and rice.    Talk to your provider about Calcium and Vitamin D.     Lifestyle    Exercise at least 150 minutes a week (30 minutes a day, 5 days a week). This will help you control your weight and prevent disease.    Limit alcohol to one drink per day.    No smoking.     Wear sunscreen to prevent skin cancer.     See your dentist every six months for an exam and cleaning.    See your eye doctor every 1 to 2 years.            Follow-ups after your visit        Additional Services     ORTHO  REFERRAL       [facility] is referring you to the Orthopedic  Services at [facility].       The  Representative will assist you in the coordination of your Orthopedic and Musculoskeletal Care as prescribed by your physician.    The  Representative will call you within 1 business day to help schedule your appointment, or you may contact the  Representative at:    All areas ~ [phone]     Type of Referral : Non Surgical       Timeframe requested: Routine    Coverage of these services is subject to the terms and limitations of your health insurance plan.  Please call member services at your health plan with any benefit or coverage questions.      If X-rays, CT or MRI's have been performed, please contact the facility where they were done to arrange for , prior to your scheduled appointment.  Please bring this referral request to your appointment and present it to your specialist.                  Future tests that were ordered for you today   "   Open Future Orders        Priority Expected Expires Ordered    MA Screening Digital Bilateral Routine  5/17/2019 5/17/2018            Who to contact     Normal or non-critical lab and imaging results will be communicated to you by RoleStart, letter or phone within 4 business days after the clinic has received the results. If you do not hear from us within 7 days, please contact the clinic through RoleStart or phone. If you have a critical or abnormal lab result, we will notify you by phone as soon as possible.  Submit refill requests through [employer] or call your pharmacy and they will forward the refill request to us. Please allow 3 business days for your refill to be completed.          If you need to speak with a  for additional information , please call: [phone]           Additional Information About Your Visit        Sigmascreening Information     [employer] gives you secure access to your electronic health record. If you see a primary care provider, you can also send messages to your care team and make appointments. If you have questions, please call your primary care clinic.  If you do not have a primary care provider, please call [phone] and they will assist you.        Care EveryWhere ID     This is your Care EveryWhere ID. This could be used by other organizations to access your [place] medical records  [id number]        Your Vitals Were     Pulse Temperature Height Breastfeeding? BMI (Body Mass Index)       92 97  F (36.1  C) (Tympanic) 5' 3\" (1.6 m) No 35.57 kg/m2        Blood Pressure from Last 3 Encounters:   05/17/18 (!) 142/92   03/24/17 (!) 136/94    Weight from Last 3 Encounters:   05/17/18 200 lb 12.8 oz (91.1 kg)   03/24/17 208 lb 3.2 oz (94.4 kg)              We Performed the Following     ORTHO  REFERRAL          Today's Medication Changes          These changes are accurate as of 5/17/18  3:52 PM.  If you have any questions, ask your nurse or doctor.             "   These medicines have changed or have updated prescriptions.        Dose/Directions    hydrochlorothiazide 25 MG tablet   Commonly known as:  HYDRODIURIL   This may have changed:  See the new instructions.   Used for:  Essential hypertension with goal blood pressure less than 140/90   Changed by:  Dalia Wu DO        TAKE 1 TABLET DAILY (EVERY 24 HOURS)   Quantity:  90 tablet   Refills:  3       sertraline 100 MG tablet   Commonly known as:  ZOLOFT   This may have changed:  See the new instructions.   Used for:  Major depression in complete remission (H)   Changed by:  Dalia Wu DO        TAKE 3 TABLETS (300MG)     DAILY   Quantity:  270 tablet   Refills:  1       simvastatin 40 MG tablet   Commonly known as:  ZOCOR   This may have changed:  See the new instructions.   Used for:  Hyperlipidemia LDL goal <130   Changed by:  Dalia Wu DO        TAKE 1 TABLET NIGHTLY   Quantity:  90 tablet   Refills:  3         Stop taking these medicines if you haven't already. Please contact your care team if you have questions.     FLUARIX QUADRIVALENT 0.5 ML injection   Generic drug:  influenza quadrivalent (PF) vacc age 3 yrs and older   Stopped by:  Dalia Wu DO                Where to get your medicines      These medications were sent to [facility] [address]  [address] [address]     Phone:  [phone]     hydrochlorothiazide 25 MG tablet    sertraline 100 MG tablet    simvastatin 40 MG tablet                Primary Care Provider Office Phone # Fax #    Dalia Wu [phone] [address] [address]        Equal Access to Services     [facility] AH: Hadii du dubono Somarioali, waaxda luqadaha, qaybta kaalmada adeegyada, leona larkin. So [facility] [phone].    ATENCIÓN: Si habla español, tiene a mast disposición servicios gratuitos de asistencia lingüística.  Edmar beltran [phone].    We comply with applicable federal civil rights laws and [place] laws. We do not discriminate on the basis of race, color, national origin, age, disability, sex, sexual orientation, or gender identity.            Thank you!     Thank you for choosing [facility]  for your care. Our goal is always to provide you with excellent care. Hearing back from our patients is one way we can continue to improve our services. Please take a few minutes to complete the written survey that you may receive in the mail after your visit with us. Thank you!             Your Updated Medication List - Protect others around you: Learn how to safely use, store and throw away your medicines at www.disposemymeds.org.          This list is accurate as of 5/17/18  3:52 PM.  Always use your most recent med list.                   Brand Name Dispense Instructions for use Diagnosis    Diphenhydramine-APAP (sleep)  MG Tabs           hydrochlorothiazide 25 MG tablet    HYDRODIURIL    90 tablet    TAKE 1 TABLET DAILY (EVERY 24 HOURS)    Essential hypertension with goal blood pressure less than 140/90       sertraline 100 MG tablet    ZOLOFT    270 tablet    TAKE 3 TABLETS (300MG)     DAILY    Major depression in complete remission (H)       simvastatin 40 MG tablet    ZOCOR    90 tablet    TAKE 1 TABLET NIGHTLY    Hyperlipidemia LDL goal <130

## 2018-05-17 NOTE — NURSING NOTE
"Initial BP (!) 142/92  Pulse 92  Temp 97  F (36.1  C) (Tympanic)  Ht 5' 3\" (1.6 m)  Wt 200 lb 12.8 oz (91.1 kg)  Breastfeeding? No  BMI 35.57 kg/m2 Estimated body mass index is 35.57 kg/(m^2) as calculated from the following:    Height as of this encounter: 5' 3\" (1.6 m).    Weight as of this encounter: 200 lb 12.8 oz (91.1 kg). .      "

## 2018-05-17 NOTE — PATIENT INSTRUCTIONS
Please stop back to clinic in 1-2 weeks for a blood pressure check.  This can be with the pharmacy if you would like.    You can call [phone] to schedule the mammogram.  Make sure to request your previous mammogram films from [facility] too.    You should be getting a call to schedule with the sports medicine docs about you hip    Preventive Health Recommendations  Female Ages 50 - 64    Yearly exam: See your health care provider every year in order to  o Review health changes.   o Discuss preventive care.    o Review your medicines if your doctor has prescribed any.      Get a Pap test every three years (unless you have an abnormal result and your provider advises testing more often).    If you get Pap tests with HPV test, you only need to test every 5 years, unless you have an abnormal result.     You do not need a Pap test if your uterus was removed (hysterectomy) and you have not had cancer.    You should be tested each year for STDs (sexually transmitted diseases) if you're at risk.     Have a mammogram every 1 to 2 years.    Have a colonoscopy at age 50, or have a yearly FIT test (stool test). These exams screen for colon cancer.      Have a cholesterol test every 5 years, or more often if advised.    Have a diabetes test (fasting glucose) every three years. If you are at risk for diabetes, you should have this test more often.     If you are at risk for osteoporosis (brittle bone disease), think about having a bone density scan (DEXA).    Shots: Get a flu shot each year. Get a tetanus shot every 10 years.    Nutrition:     Eat at least 5 servings of fruits and vegetables each day.    Eat whole-grain bread, whole-wheat pasta and brown rice instead of white grains and rice.    Talk to your provider about Calcium and Vitamin D.     Lifestyle    Exercise at least 150 minutes a week (30 minutes a day, 5 days a week). This will help you control your weight and prevent disease.    Limit alcohol to one  drink per day.    No smoking.     Wear sunscreen to prevent skin cancer.     See your dentist every six months for an exam and cleaning.    See your eye doctor every 1 to 2 years.

## 2018-05-18 ASSESSMENT — PATIENT HEALTH QUESTIONNAIRE - PHQ9: SUM OF ALL RESPONSES TO PHQ QUESTIONS 1-9: 16

## 2018-05-22 ENCOUNTER — OFFICE VISIT (OUTPATIENT)
Dept: ORTHOPEDICS | Facility: CLINIC | Age: 63
End: 2018-05-22
Payer: COMMERCIAL

## 2018-05-22 VITALS
SYSTOLIC BLOOD PRESSURE: 142 MMHG | DIASTOLIC BLOOD PRESSURE: 84 MMHG | BODY MASS INDEX: 35.44 KG/M2 | HEIGHT: 63 IN | WEIGHT: 200 LBS

## 2018-05-22 DIAGNOSIS — M16.11 PRIMARY OSTEOARTHRITIS OF RIGHT HIP: Primary | ICD-10-CM

## 2018-05-22 DIAGNOSIS — M25.551 LATERAL PAIN OF RIGHT HIP: ICD-10-CM

## 2018-05-22 LAB
COPATH REPORT: NORMAL
PAP: NORMAL

## 2018-05-22 PROCEDURE — 99243 OFF/OP CNSLTJ NEW/EST LOW 30: CPT | Performed by: PEDIATRICS

## 2018-05-22 NOTE — LETTER
5/22/2018         RE: Dee Dee Shepherd  [address]  [facility] [address]        Dear Colleague,    Thank you for referring your patient, Dee Dee Shepherd, to the [facility] [place]. Please see a copy of my visit note below.    Sports Medicine Clinic Visit    PCP: Dalia Wu Shereen    Dee Dee Shepherd is a 62 year old female who is seen  in consultation at the request of  Dalia Wu D.O. presenting with right hip pain.  Pain over the lateral aspect of her hip and into her groin and adductor area.  Pain with changing positions in bed and with hip flexion.  Pain has been increasing over the past 2 years.      **  Pain in the right hip with movement, but denies pain at rest.     **   Left long finger has been popping sporadically with returning to neutral after flexion.  No currently painful.     Injury: gradual onset     Location of Pain: right hip   Duration of Pain: 2 year(s)  Rating of Pain at worst: 7/10  Rating of Pain Currently: 3/10  Symptoms are better with: Nothing  Symptoms are worse with: hip flexion, changing positions in bed   Additional Features:   Positive: instability   Negative: swelling, bruising, popping, grinding, catching, locking, paresthesias, numbness, weakness, pain in other joints and systemic symptoms  Other evaluation and/or treatments so far consists of: previous  xrays   Prior History of related problems: denies     Social History: administration, desk job     Review of Systems  Musculoskeletal: as above  Remainder of review of systems is negative including constitutional, CV, pulmonary, GI, Skin and Neurologic except as noted in HPI or medical history.    This document serves as a record of the services and decisions personally performed and made by Sukhdev Staples DO, CAQ. It was created on his behalf by Nicola Peterson, a trained medical scribe. The creation of this document is based the provider's statements to the medical  "quincy.  Nicola Peterson May 22, 2018, 3:54 PM      Past Medical History:   Diagnosis Date     Anxiety      Chronic low back pain      Depressive disorder      Hyperlipidemia      Hypertension      Malignant neoplasm of breast (female) (H)     right     Pap smear abnormality of cervix with cytologic evidence of malignancy      Tobacco abuse      Past Surgical History:   Procedure Laterality Date     C LIGATE FALLOPIAN TUBE       MASTECTOMY       Family History   Problem Relation Age of Onset     Hypertension Father      DIABETES Brother      Social History     Social History     Marital status:      Spouse name: N/A     Number of children: N/A     Years of education: N/A     Occupational History     Not on file.     Social History Main Topics     Smoking status: Current Every Day Smoker     Packs/day: 0.50     Years: 35.00     Types: Cigarettes     Smokeless tobacco: Never Used     Alcohol use Yes     Drug use: No     Sexual activity: No     Other Topics Concern     Parent/Sibling W/ Cabg, Mi Or Angioplasty Before 65f 55m? No     Social History Narrative       Objective  /84  Ht 5' 3\" (1.6 m)  Wt 200 lb (90.7 kg)  BMI 35.43 kg/m2      GENERAL APPEARANCE: healthy, alert and no distress   GAIT: NORMAL  SKIN: no suspicious lesions or rashes  NEURO: Normal strength and tone, mentation intact and speech normal  PSYCH:  mentation appears normal and affect normal/bright  HEENT: no scleral icterus  CV: no extremity edema   RESP: nonlabored breathing    Right hip exam    Inspection:        no edema or ecchymosis in hip area    ROM:       Flexion slightly limited, with pain anterior hip       internal rotation 10-20 degrees left with tightness, ~neutral on right with increased pain       external rotation 50-60 degrees left, 40-50 deg right with increased pain     Strength:        abduction 4/5 on right, slight pain        adduction 3+/5 on right, no change in pain     Tender:        greater trochanter      "  Mild anterior hip     Non Tender:        remainder of hip area    Sensation:        grossly intact in hip and thigh    Skin:       well perfused       capillary refill brisk    Special Tests:        positive (+) FADIR for pain        Log roll positive (+), mild pain in area of chief complaint     **  Does have tightness along the lateral upper leg with knee extension.     Radiology  Visualized radiographs of right hip from 3/24/2017, and reviewed the images with the patient.  Impression: moderate OA bilateral hips, films nonweightbearing.     XR PELVIS AND HIP RIGHT 1 VIEW  3/24/2017 4:15 PM     HISTORY:  Pain in right hip     COMPARISON:  None.         IMPRESSION:  Moderate to advanced degenerative changes of both hips.  Significant superolateral joint space narrowing bilaterally, left  greater than right. No acute process.      AMANDEEP RODRIGUEZ MD    Assessment:  1. Primary osteoarthritis of right hip    2. Lateral pain of right hip        Plan:  Discussed the assessment with the patient.   With right hip osteoarthritis and lateral hip pain. Likely more OA component, given reduced ROM and exam findings. However, also has some lateral hip tenderness, greater trochanteric pain syndrome.    We discussed the following: symptom treatment, activity modification/rest, imaging, rehab, referral to surgeon and injection therapy. Following discussion, plan:  Topical Treatments: The patient is advised to apply ice or cold packs, heat or warm packs intermittently as needed to relieve pain.  Over the counter medication: Patient's preferred OTC medication as directed on packaging.  Plain films of the area reviewed with the patient. No clear indication for additional imaging currently, though we discussed potential to obtain weightbearing films to assess better the degree of OA.   Activity Modification: as discussed ; ok to remain active as able.  Rehab: Provided HEP program in clinic. Patient may call if referral to physical  therapy desired. For now, she prefers HEP.  Discussed injection options for the right hip, including intra-articular under ultrasound guidance, versus lateral hip. She declined injection for now, prefers rehab approach to start. If persistent issues, we discussed potential for injection, likely intra-articular.   Follow up: in 3-4 weeks if not improving; otherwise, as needed. We will consider referral to physical therapy and/or Dr. Nunes for ultrasound guided corticosteroid injection pending course with HEP.   Questions answered. The patient indicates understanding of these issues and agrees with the plan.       **  Incidentally, she is describing a left long finger trigger finger, no pain currently. She will monitor, may seek treatment if problematic.      Sukhdev Staples DO, CAQ    CC: Dalia Wu       Disclaimer: This note consists of symbols derived from keyboarding, dictation and/or voice recognition software. As a result, there may be errors in the script that have gone undetected. Please consider this when interpreting information found in this chart.    The information in this document, created by the medical scribe for me, accurately reflects the services I personally performed and the decisions made by me. I have reviewed and approved this document for accuracy.   Sukhdev Staples DO, CAQ     Again, thank you for allowing me to participate in the care of your patient.        Sincerely,        Sukhdev Staples DO

## 2018-05-22 NOTE — MR AVS SNAPSHOT
"              After Visit Summary   5/22/2018    Dee Dee Shepherd    MRN: [id number]           Patient Information     Date Of Birth          1955        Visit Information        Provider Department      5/22/2018 3:40 PM Sukhdev Staples, DO [facility]        Today's Diagnoses     Primary osteoarthritis of right hip    -  1    Lateral pain of right hip          Care Instructions    Patient to follow up with Primary Care provider regarding elevated blood pressure.            Follow-ups after your visit        Follow-up notes from your care team     Return in about 4 weeks (around 6/19/2018), or if symptoms worsen or fail to improve.      Your next 10 appointments already scheduled     May 24, 2018  4:00 PM CDT   MA SCREENING DIGITAL BILATERAL with [facility]MA2   [facility] ([facility])    [address] [address] [phone]           Do not use any powder, lotion or deodorant under your arms or on your breast. If you do, we will ask you to remove it before your exam.  Wear comfortable, two-piece clothing.  If you have any allergies, tell your care team.  Bring any previous mammograms from other facilities or have them mailed to the breast center. Three-dimensional (3D) mammograms are available at [facility] locations in [facility], [place], [place], [facility], [place], [place], and [place]. [facility] locations include [place] [facility] in [place]. Benefits of 3D mammograms include: - Improved rate of cancer detection - Decreases your chance of having to go back for more tests, which means fewer: - \"False-positive\" results (This means that there is an abnormal area but it isn't cancer.) - Invasive testing procedures, such as a biopsy or surgery - Can provide clearer images of the breast if you have dense breast tissue. 3D mammography is an optional exam that anyone can have with a 2D " "mammogram. It doesn't replace or take the place of a 2D mammogram. 2D mammograms remain an effective screening test for all women.  Not all insurance companies cover the cost of a 3D mammogram. Check with your insurance.              Who to contact     If you have questions or need follow up information about today's clinic visit or your schedule please contact [facility] directly at [phone].  Normal or non-critical lab and imaging results will be communicated to you by KelBillethart, letter or phone within 4 business days after the clinic has received the results. If you do not hear from us within 7 days, please contact the clinic through [employer] or phone. If you have a critical or abnormal lab result, we will notify you by phone as soon as possible.  Submit refill requests through [employer] or call your pharmacy and they will forward the refill request to us. Please allow 3 business days for your refill to be completed.          Additional Information About Your Visit        [employer] Information     [employer] gives you secure access to your electronic health record. If you see a primary care provider, you can also send messages to your care team and make appointments. If you have questions, please call your primary care clinic.  If you do not have a primary care provider, please call [phone] and they will assist you.        Care EveryWhere ID     This is your Care EveryWhere ID. This could be used by other organizations to access your [facility] medical records  [id number]        Your Vitals Were     Height BMI (Body Mass Index)                5' 3\" (1.6 m) 35.43 kg/m2           Blood Pressure from Last 3 Encounters:   05/22/18 142/84   05/17/18 (!) 142/92   03/24/17 (!) 136/94    Weight from Last 3 Encounters:   05/22/18 200 lb (90.7 kg)   05/17/18 200 lb 12.8 oz (91.1 kg)   03/24/17 208 lb 3.2 oz (94.4 kg)              Today, you had the following     No orders found for display    "    Primary Care Provider Office Phone # Fax #    Dalia Wu,  [phone] [address] [place] [address]        Equal Access to Services     ASHVIN LOWE : Elvis leiva jagdisho Somarioali, waaxda luqadaha, qaybta kaalmada adejordynyada, leona lopez laGretaerica larkin. So [facility] [phone].    ATENCIÓN: Si habla español, tiene a mast disposición servicios gratuitos de asistencia lingüística. Llame al [phone].    We comply with applicable federal civil rights laws and [place] laws. We do not discriminate on the basis of race, color, national origin, age, disability, sex, sexual orientation, or gender identity.            Thank you!     Thank you for choosing [facility]  for your care. Our goal is always to provide you with excellent care. Hearing back from our patients is one way we can continue to improve our services. Please take a few minutes to complete the written survey that you may receive in the mail after your visit with us. Thank you!             Your Updated Medication List - Protect others around you: Learn how to safely use, store and throw away your medicines at www.disposemymeds.org.          This list is accurate as of 5/22/18  5:12 PM.  Always use your most recent med list.                   Brand Name Dispense Instructions for use Diagnosis    Diphenhydramine-APAP (sleep)  MG Tabs           hydrochlorothiazide 25 MG tablet    HYDRODIURIL    90 tablet    TAKE 1 TABLET DAILY (EVERY 24 HOURS)    Essential hypertension with goal blood pressure less than 140/90       sertraline 100 MG tablet    ZOLOFT    270 tablet    TAKE 3 TABLETS (300MG)     DAILY    Major depression in complete remission (H)       simvastatin 40 MG tablet    ZOCOR    90 tablet    TAKE 1 TABLET NIGHTLY    Hyperlipidemia LDL goal <130

## 2018-05-22 NOTE — PROGRESS NOTES
Sports Medicine Clinic Visit    PCP: Dalia Wukaci Shepherd is a 62 year old female who is seen  in consultation at the request of  Dalia Wu D.O. presenting with right hip pain.  Pain over the lateral aspect of her hip and into her groin and adductor area.  Pain with changing positions in bed and with hip flexion.  Pain has been increasing over the past 2 years.      **  Pain in the right hip with movement, but denies pain at rest.     **   Left long finger has been popping sporadically with returning to neutral after flexion.  No currently painful.     Injury: gradual onset     Location of Pain: right hip   Duration of Pain: 2 year(s)  Rating of Pain at worst: 7/10  Rating of Pain Currently: 3/10  Symptoms are better with: Nothing  Symptoms are worse with: hip flexion, changing positions in bed   Additional Features:   Positive: instability   Negative: swelling, bruising, popping, grinding, catching, locking, paresthesias, numbness, weakness, pain in other joints and systemic symptoms  Other evaluation and/or treatments so far consists of: previous  xrays   Prior History of related problems: denies     Social History: administration, desk job     Review of Systems  Musculoskeletal: as above  Remainder of review of systems is negative including constitutional, CV, pulmonary, GI, Skin and Neurologic except as noted in HPI or medical history.    This document serves as a record of the services and decisions personally performed and made by Sukhdev Staples DO, CAQ. It was created on his behalf by Nicola Peterson, a trained medical scribe. The creation of this document is based the provider's statements to the medical scribe.  Nicola Peterson May 22, 2018, 3:54 PM      Past Medical History:   Diagnosis Date     Anxiety      Chronic low back pain      Depressive disorder      Hyperlipidemia      Hypertension      Malignant neoplasm of breast (female) (H)     right     Pap smear abnormality  "of cervix with cytologic evidence of malignancy      Tobacco abuse      Past Surgical History:   Procedure Laterality Date     C LIGATE FALLOPIAN TUBE       MASTECTOMY       Family History   Problem Relation Age of Onset     Hypertension Father      DIABETES Brother      Social History     Social History     Marital status:      Spouse name: N/A     Number of children: N/A     Years of education: N/A     Occupational History     Not on file.     Social History Main Topics     Smoking status: Current Every Day Smoker     Packs/day: 0.50     Years: 35.00     Types: Cigarettes     Smokeless tobacco: Never Used     Alcohol use Yes     Drug use: No     Sexual activity: No     Other Topics Concern     Parent/Sibling W/ Cabg, Mi Or Angioplasty Before 65f 55m? No     Social History Narrative       Objective  /84  Ht 5' 3\" (1.6 m)  Wt 200 lb (90.7 kg)  BMI 35.43 kg/m2      GENERAL APPEARANCE: healthy, alert and no distress   GAIT: NORMAL  SKIN: no suspicious lesions or rashes  NEURO: Normal strength and tone, mentation intact and speech normal  PSYCH:  mentation appears normal and affect normal/bright  HEENT: no scleral icterus  CV: no extremity edema   RESP: nonlabored breathing    Right hip exam    Inspection:        no edema or ecchymosis in hip area    ROM:       Flexion slightly limited, with pain anterior hip       internal rotation 10-20 degrees left with tightness, ~neutral on right with increased pain       external rotation 50-60 degrees left, 40-50 deg right with increased pain     Strength:        abduction 4/5 on right, slight pain        adduction 3+/5 on right, no change in pain     Tender:        greater trochanter       Mild anterior hip     Non Tender:        remainder of hip area    Sensation:        grossly intact in hip and thigh    Skin:       well perfused       capillary refill brisk    Special Tests:        positive (+) FADIR for pain        Log roll positive (+), mild pain in area " of chief complaint     **  Does have tightness along the lateral upper leg with knee extension.     Radiology  Visualized radiographs of right hip from 3/24/2017, and reviewed the images with the patient.  Impression: moderate OA bilateral hips, films nonweightbearing.     XR PELVIS AND HIP RIGHT 1 VIEW  3/24/2017 4:15 PM     HISTORY:  Pain in right hip     COMPARISON:  None.         IMPRESSION:  Moderate to advanced degenerative changes of both hips.  Significant superolateral joint space narrowing bilaterally, left  greater than right. No acute process.      AMANDEEP RODRIGUEZ MD    Assessment:  1. Primary osteoarthritis of right hip    2. Lateral pain of right hip        Plan:  Discussed the assessment with the patient.   With right hip osteoarthritis and lateral hip pain. Likely more OA component, given reduced ROM and exam findings. However, also has some lateral hip tenderness, greater trochanteric pain syndrome.    We discussed the following: symptom treatment, activity modification/rest, imaging, rehab, referral to surgeon and injection therapy. Following discussion, plan:  Topical Treatments: The patient is advised to apply ice or cold packs, heat or warm packs intermittently as needed to relieve pain.  Over the counter medication: Patient's preferred OTC medication as directed on packaging.  Plain films of the area reviewed with the patient. No clear indication for additional imaging currently, though we discussed potential to obtain weightbearing films to assess better the degree of OA.   Activity Modification: as discussed ; ok to remain active as able.  Rehab: Provided HEP program in clinic. Patient may call if referral to physical therapy desired. For now, she prefers HEP.  Discussed injection options for the right hip, including intra-articular under ultrasound guidance, versus lateral hip. She declined injection for now, prefers rehab approach to start. If persistent issues, we discussed potential for  injection, likely intra-articular.   Follow up: in 3-4 weeks if not improving; otherwise, as needed. We will consider referral to physical therapy and/or Dr. Nunes for ultrasound guided corticosteroid injection pending course with HEP.   Questions answered. The patient indicates understanding of these issues and agrees with the plan.       **  Incidentally, she is describing a left long finger trigger finger, no pain currently. She will monitor, may seek treatment if problematic.      Sukhdev Staples DO, CAQ    CC: Dalia Wu       Disclaimer: This note consists of symbols derived from keyboarding, dictation and/or voice recognition software. As a result, there may be errors in the script that have gone undetected. Please consider this when interpreting information found in this chart.    The information in this document, created by the medical scribe for me, accurately reflects the services I personally performed and the decisions made by me. I have reviewed and approved this document for accuracy.   Sukhdev Staples DO, CAQ

## 2018-05-23 LAB
FINAL DIAGNOSIS: NORMAL
HPV HR 12 DNA CVX QL NAA+PROBE: NEGATIVE
HPV16 DNA SPEC QL NAA+PROBE: NEGATIVE
HPV18 DNA SPEC QL NAA+PROBE: NEGATIVE
SPECIMEN DESCRIPTION: NORMAL
SPECIMEN SOURCE CVX/VAG CYTO: NORMAL

## 2018-05-31 ENCOUNTER — HOSPITAL ENCOUNTER (OUTPATIENT)
Dept: MAMMOGRAPHY | Facility: CLINIC | Age: 63
Discharge: HOME | End: 2018-05-31
Attending: FAMILY MEDICINE | Admitting: FAMILY MEDICINE
Payer: COMMERCIAL

## 2018-05-31 DIAGNOSIS — Z12.31 ENCOUNTER FOR SCREENING MAMMOGRAM FOR BREAST CANCER: ICD-10-CM

## 2018-05-31 PROCEDURE — 77067 SCR MAMMO BI INCL CAD: CPT

## 2018-06-12 ENCOUNTER — ALLIED HEALTH/NURSE VISIT (OUTPATIENT)
Dept: FAMILY MEDICINE | Facility: CLINIC | Age: 63
End: 2018-06-12
Payer: COMMERCIAL

## 2018-06-12 VITALS — DIASTOLIC BLOOD PRESSURE: 88 MMHG | SYSTOLIC BLOOD PRESSURE: 136 MMHG

## 2018-06-12 DIAGNOSIS — I10 ESSENTIAL HYPERTENSION WITH GOAL BLOOD PRESSURE LESS THAN 140/90: Primary | ICD-10-CM

## 2018-06-12 PROCEDURE — 99207 ZZC NO CHARGE NURSE ONLY: CPT | Performed by: FAMILY MEDICINE

## 2018-06-12 NOTE — MR AVS SNAPSHOT
After Visit Summary   6/12/2018    Dee Dee Shepherd    MRN: [id number]           Patient Information     Date Of Birth          1955        Visit Information        Provider Department      6/12/2018 4:16 PM Dalia Wu DO [facility]        Today's Diagnoses     Essential hypertension with goal blood pressure less than 140/90    -  1       Follow-ups after your visit        Who to contact     Normal or non-critical lab and imaging results will be communicated to you by Pannahart, letter or phone within 4 business days after the clinic has received the results. If you do not hear from us within 7 days, please contact the clinic through Pannahart or phone. If you have a critical or abnormal lab result, we will notify you by phone as soon as possible.  Submit refill requests through [employer] or call your pharmacy and they will forward the refill request to us. Please allow 3 business days for your refill to be completed.          If you need to speak with a  for additional information , please call: [phone]           Additional Information About Your Visit        MyChart Information     [employer] gives you secure access to your electronic health record. If you see a primary care provider, you can also send messages to your care team and make appointments. If you have questions, please call your primary care clinic.  If you do not have a primary care provider, please call [phone] and they will assist you.        Care EveryWhere ID     This is your Care EveryWhere ID. This could be used by other organizations to access your [facility] medical records  [id number]         Blood Pressure from Last 3 Encounters:   06/12/18 136/88   05/22/18 142/84   05/17/18 (!) 142/92    Weight from Last 3 Encounters:   05/22/18 200 lb (90.7 kg)   05/17/18 200 lb 12.8 oz (91.1 kg)   03/24/17 208 lb 3.2 oz (94.4 kg)              Today, you had the following     No orders found  for display       Primary Care Provider Office Phone # Fax #    Dalia Wu,  [phone] [address] [place] [address]        Equal Access to Services     ASHVIN LOWE : Hadii aad ku hadaarono Somarioali, waaxda luqadaha, qaybta kaalmada adeegyada, leona lopez laGretaerica larkin. So [facility] [phone].    ATENCIÓN: Si habla español, tiene a mast disposición servicios gratuitos de asistencia lingüística. Llame al [phone].    We comply with applicable federal civil rights laws and [place] laws. We do not discriminate on the basis of race, color, national origin, age, disability, sex, sexual orientation, or gender identity.            Thank you!     Thank you for choosing [facility] [place]  for your care. Our goal is always to provide you with excellent care. Hearing back from our patients is one way we can continue to improve our services. Please take a few minutes to complete the written survey that you may receive in the mail after your visit with us. Thank you!             Your Updated Medication List - Protect others around you: Learn how to safely use, store and throw away your medicines at www.disposemymeds.org.          This list is accurate as of 6/12/18  4:22 PM.  Always use your most recent med list.                   Brand Name Dispense Instructions for use Diagnosis    Diphenhydramine-APAP (sleep)  MG Tabs           hydrochlorothiazide 25 MG tablet    HYDRODIURIL    90 tablet    TAKE 1 TABLET DAILY (EVERY 24 HOURS)    Essential hypertension with goal blood pressure less than 140/90       sertraline 100 MG tablet    ZOLOFT    270 tablet    TAKE 3 TABLETS (300MG)     DAILY    Major depression in complete remission (H)       simvastatin 40 MG tablet    ZOCOR    90 tablet    TAKE 1 TABLET NIGHTLY    Hyperlipidemia LDL goal <130

## 2018-06-12 NOTE — PROGRESS NOTES
Dee Dee Shepherd is enrolled/participating in the retail pharmacy Blood Pressure Goals Achievement Program (BPGAP).  Dee Dee Shepherd was evaluated at [facility] on June 12, 2018 at which time her blood pressure was:    BP Readings from Last 3 Encounters:   06/12/18 136/88   05/22/18 142/84   05/17/18 (!) 142/92     Reviewed lifestyle modifications for blood pressure control and reduction: including making healthy food choices, managing weight, getting regular exercise, smoking cessation, reducing alcohol consumption, monitoring blood pressure regularly.     Dee Dee Shepherd is not experiencing symptoms.    Follow-Up: BP is at goal of < 140/90mmHg (patient 18+ years of age with or without diabetes).  Recommended follow-up at pharmacy in 6 months.     Recommendation to Provider: none    Dee Dee Shepherd was evaluated for enrollment into the PGEN study today.    Patient eligible for enrollment:  No  Patient interested in enrollment:  No    Completed by: Pepito Hughes, Pharmacist  [facility]       \

## 2018-07-02 ENCOUNTER — OFFICE VISIT (OUTPATIENT)
Dept: FAMILY MEDICINE | Facility: CLINIC | Age: 63
End: 2018-07-02
Payer: COMMERCIAL

## 2018-07-02 VITALS
DIASTOLIC BLOOD PRESSURE: 80 MMHG | TEMPERATURE: 97 F | HEART RATE: 80 BPM | WEIGHT: 192.8 LBS | BODY MASS INDEX: 34.16 KG/M2 | HEIGHT: 63 IN | SYSTOLIC BLOOD PRESSURE: 134 MMHG

## 2018-07-02 DIAGNOSIS — Z72.0 TOBACCO ABUSE: Primary | ICD-10-CM

## 2018-07-02 PROCEDURE — 99213 OFFICE O/P EST LOW 20 MIN: CPT | Performed by: FAMILY MEDICINE

## 2018-07-02 RX ORDER — VARENICLINE TARTRATE 1 MG/1
1 TABLET, FILM COATED ORAL 2 TIMES DAILY
Qty: 56 TABLET | Refills: 1 | Status: SHIPPED | OUTPATIENT
Start: 2018-07-02 | End: 2019-11-11

## 2018-07-02 NOTE — NURSING NOTE
"Initial /80  Pulse 80  Temp 97  F (36.1  C) (Tympanic)  Ht 5' 3\" (1.6 m)  Wt 192 lb 12.8 oz (87.5 kg)  Breastfeeding? No  BMI 34.15 kg/m2 Estimated body mass index is 34.15 kg/(m^2) as calculated from the following:    Height as of this encounter: 5' 3\" (1.6 m).    Weight as of this encounter: 192 lb 12.8 oz (87.5 kg). .      "

## 2018-07-02 NOTE — PROGRESS NOTES
SUBJECTIVE:   Dee Dee Shepherd is a 62 year old female who presents to clinic today for the following health issues:    * discuss smoking cessation    Has been 25 years since she last attempted to quit.  Has tried patch and lozenges in the past.  Tried cold turkey which did not work well.  Did try chantix at one point but was not really ready to quit.  When she tried Chantix she tolerated it well, no problems.      Feels mood is stable, no concerns.    Problem list and histories reviewed & adjusted, as indicated.  Additional history: as documented    Reviewed and updated as needed this visit by clinical staff  Tobacco  Allergies  Meds  Med Hx  Surg Hx  Fam Hx  Soc Hx      Reviewed and updated as needed this visit by Provider  Tobacco  Med Hx  Surg Hx  Fam Hx  Soc Hx        ROS: Remainder of Constitutional, CV, Respiratory, GI,  negative with exception of that mentioned above    PE:  VS as above   Gen:  WN/WD/WH female in NAD  Remainder of exam deferred      A/P:      ICD-10-CM    1. Tobacco abuse Z72.0 HEALTH  REFERRAL     varenicline (CHANTIX) 1 MG tablet     varenicline (CHANTIX STARTING MONTH PAK) 0.5 MG X 11 & 1 MG X 42 tablet   reviewed options.  Pt desires trial of Chantix.  Reviewed possible mood changes, dreams and drowsiness.  She verbalized understanding and will monitor for side effects.    Reviewed appropriate use and duration of use.  Support given..  Health  referral placed.    15 minutes of 15 minute appointment spent reviewing tob cessation and plan as above

## 2018-07-02 NOTE — MR AVS SNAPSHOT
After Visit Summary   7/2/2018    Dee Dee Shepherd    MRN: [id number]           Patient Information     Date Of Birth          1955        Visit Information        Provider Department      7/2/2018 2:20 PM Dalia Wu DO [facility]        Today's Diagnoses     Tobacco abuse    -  1       Follow-ups after your visit        Additional Services     HEALTH  REFERRAL       Your provider has referred you to a Health .    A Health  will reach out to the patient within three days, if the following criteria has been met.     Clinic: [facility]    Reason for Referral: Obesity/Hypertension    Patient does have knowledge of this service.    Patient Criteria: Obesity (BMI 34.22)/ Hypertension (Blood Pressure >= 140/90)    Provider's Main Focus: Tobacco: starting Chantix                  Who to contact     Normal or non-critical lab and imaging results will be communicated to you by Ryposhart, letter or phone within 4 business days after the clinic has received the results. If you do not hear from us within 7 days, please contact the clinic through MyChart or phone. If you have a critical or abnormal lab result, we will notify you by phone as soon as possible.  Submit refill requests through Memorado or call your pharmacy and they will forward the refill request to us. Please allow 3 business days for your refill to be completed.          If you need to speak with a  for additional information , please call: [phone]           Additional Information About Your Visit        RyposharHouseTrip Information     Memorado gives you secure access to your electronic health record. If you see a primary care provider, you can also send messages to your care team and make appointments. If you have questions, please call your primary care clinic.  If you do not have a primary care provider, please call [phone] and they will assist you.        Care  "EveryWhere ID     This is your Care EveryWhere ID. This could be used by other organizations to access your [facility] medical records  [id number]        Your Vitals Were     Pulse Temperature Height Breastfeeding? BMI (Body Mass Index)       80 97  F (36.1  C) (Tympanic) 5' 3\" (1.6 m) No 34.15 kg/m2        Blood Pressure from Last 3 Encounters:   07/02/18 134/80   06/12/18 136/88   05/22/18 142/84    Weight from Last 3 Encounters:   07/02/18 192 lb 12.8 oz (87.5 kg)   05/22/18 200 lb (90.7 kg)   05/17/18 200 lb 12.8 oz (91.1 kg)              We Performed the Following     HEALTH  REFERRAL          Today's Medication Changes          These changes are accurate as of 7/2/18  9:36 PM.  If you have any questions, ask your nurse or doctor.               Start taking these medicines.        Dose/Directions    * varenicline 1 MG tablet   Commonly known as:  CHANTIX   Used for:  Tobacco abuse   Started by:  Dalia Wu DO        Dose:  1 mg   Take 1 tablet (1 mg) by mouth 2 times daily   Quantity:  56 tablet   Refills:  1       * varenicline 0.5 MG X 11 & 1 MG X 42 tablet   Commonly known as:  CHANTIX STARTING MONTH VIOLETA   Used for:  Tobacco abuse   Started by:  Dalia Wu DO        Take 0.5 mg tab daily for 3 days, then 0.5 mg tab twice daily for 4 days, then 1 mg twice daily.   Quantity:  53 tablet   Refills:  0       * Notice:  This list has 2 medication(s) that are the same as other medications prescribed for you. Read the directions carefully, and ask your doctor or other care provider to review them with you.         Where to get your medicines      These medications were sent to [facility] [address]  [address] [address]     Phone:  [phone]     varenicline 0.5 MG X 11 & 1 MG X 42 tablet    varenicline 1 MG tablet                Primary Care Provider Office Phone # Fax #    Dalia Wu [phone] [place] [address]        Equal Access to Services     OSMAN LOWE : Hadii aad ku hadaaronbrayan Calvillo, waaxda luqadaha, qaybta kaalscar min, lenoa larkin. So [facility] [phone].    ATENCIÓN: Si habla español, tiene a mast disposición servicios gratuitos de asistencia lingüística. [facility] [phone].    We comply with applicable federal civil rights laws and [place] laws. We do not discriminate on the basis of race, color, national origin, age, disability, sex, sexual orientation, or gender identity.            Thank you!     [facility]. Our goal is always to provide you with excellent care. Hearing back from our patients is one way we can continue to improve our services. Please take a few minutes to complete the written survey that you may receive in the mail after your visit with us. Thank you!             Your Updated Medication List - Protect others around you: Learn how to safely use, store and throw away your medicines at www.disposemymeds.org.          This list is accurate as of 7/2/18  9:36 PM.  Always use your most recent med list.                   Brand Name Dispense Instructions for use Diagnosis    Diphenhydramine-APAP (sleep)  MG Tabs           hydrochlorothiazide 25 MG tablet    HYDRODIURIL    90 tablet    TAKE 1 TABLET DAILY (EVERY 24 HOURS)    Essential hypertension with goal blood pressure less than 140/90       sertraline 100 MG tablet    ZOLOFT    270 tablet    TAKE 3 TABLETS (300MG)     DAILY    Major depression in complete remission (H)       simvastatin 40 MG tablet    ZOCOR    90 tablet    TAKE 1 TABLET NIGHTLY    Hyperlipidemia LDL goal <130       * varenicline 1 MG tablet    CHANTIX    56 tablet    Take 1 tablet (1 mg) by mouth 2 times daily    Tobacco abuse       * varenicline 0.5 MG X 11 & 1 MG X 42 tablet    CHANTIX STARTING MONTH VIOLETA    53 tablet    Take 0.5 mg tab daily for 3  days, then 0.5 mg tab twice daily for 4 days, then 1 mg twice daily.    Tobacco abuse       * Notice:  This list has 2 medication(s) that are the same as other medications prescribed for you. Read the directions carefully, and ask your doctor or other care provider to review them with you.

## 2018-07-06 ENCOUNTER — TELEPHONE (OUTPATIENT)
Dept: NURSING | Facility: CLINIC | Age: 63
End: 2018-07-06

## 2018-07-25 ENCOUNTER — ALLIED HEALTH/NURSE VISIT (OUTPATIENT)
Dept: NURSING | Facility: CLINIC | Age: 63
End: 2018-07-25

## 2018-07-25 DIAGNOSIS — Z71.6 ENCOUNTER FOR TOBACCO USE CESSATION COUNSELING: Primary | ICD-10-CM

## 2018-07-25 PROCEDURE — 99207 ZZC HEALTH COACHING, NO CHARGE: CPT

## 2018-07-25 NOTE — MR AVS SNAPSHOT
After Visit Summary   7/25/2018    Dee Dee Shepherd    MRN: [id number]           Patient Information     Date Of Birth          1955        Visit Information        Provider Department      7/25/2018 2:30 PM Robin Gann [place] Delia Bertrand        Today's Diagnoses     Encounter for tobacco use cessation counseling    -  1       Follow-ups after your visit        Follow-up notes from your care team     Return in 4 weeks (on 8/22/2018).      Who to contact     If you have questions or need follow up information about today's clinic visit or your schedule please contact [facility] directly at [phone].  Normal or non-critical lab and imaging results will be communicated to you by [facility], letter or phone within 4 business days after the clinic has received the results. If you do not hear from us within 7 days, please contact the clinic through [facility] [employer] [facility] or phone. If you have a critical or abnormal lab result, we will notify you by phone as soon as possible.  Submit refill requests through Zinitix or call your pharmacy and they will forward the refill request to us. Please allow 3 business days for your refill to be completed.          Additional Information About Your Visit        MyChart Information     Zinitix gives you secure access to your electronic health record. If you see a primary care provider, you can also send messages to your care team and make appointments. If you have questions, please call your primary care clinic.  If you do not have a primary care provider, please call [phone] and they will assist you.        Care EveryWhere ID     This is your Care EveryWhere ID. This could be used by other organizations to access your [facility] medical records  [id number]         Blood Pressure from Last 3 Encounters:   07/02/18 134/80   06/12/18 136/88   05/22/18 142/84    Weight from Last 3 Encounters:   07/02/18 192 lb 12.8 oz (87.5 kg)   05/22/18 200 lb (90.7 kg)    05/17/18 200 lb 12.8 oz (91.1 kg)              Today, you had the following     No orders found for display       Primary Care Provider Office Phone # Fax #    Dalia Wu,  [phone] [address] [place] [address]        Equal Access to Services     [facility] CHRISSY : Hadii aad ku hadasho Soomaali, waaxda luqadaha, qaybta kaalmada adeegyada, waxay idiin hayaan adeeg john la'aan ah. So [facility] [phone].    ATENCIÓN: Si habla español, tiene a mast disposición servicios gratuitos de asistencia lingüística. [facility] [phone].    We comply with applicable federal civil rights laws and [place] laws. We do not discriminate on the basis of race, color, national origin, age, disability, sex, sexual orientation, or gender identity.            Thank you!     Thank you for choosing [facility]  for your care. Our goal is always to provide you with excellent care. Hearing back from our patients is one way we can continue to improve our services. Please take a few minutes to complete the written survey that you may receive in the mail after your visit with us. Thank you!             Your Updated Medication List - Protect others around you: Learn how to safely use, store and throw away your medicines at www.disposemymeds.org.          This list is accurate as of 7/25/18 11:59 PM.  Always use your most recent med list.                   Brand Name Dispense Instructions for use Diagnosis    Diphenhydramine-APAP (sleep)  MG Tabs           hydrochlorothiazide 25 MG tablet    HYDRODIURIL    90 tablet    TAKE 1 TABLET DAILY (EVERY 24 HOURS)    Essential hypertension with goal blood pressure less than 140/90       sertraline 100 MG tablet    ZOLOFT    270 tablet    TAKE 3 TABLETS (300MG)     DAILY    Major depression in complete remission (H)       simvastatin 40 MG tablet    ZOCOR    90 tablet    TAKE 1 TABLET NIGHTLY    Hyperlipidemia LDL goal <130       * varenicline 1 MG  tablet    CHANTIX    56 tablet    Take 1 tablet (1 mg) by mouth 2 times daily    Tobacco abuse       * varenicline 0.5 MG X 11 & 1 MG X 42 tablet    CHANTIX STARTING MONTH VIOLETA    53 tablet    Take 0.5 mg tab daily for 3 days, then 0.5 mg tab twice daily for 4 days, then 1 mg twice daily.    Tobacco abuse       * Notice:  This list has 2 medication(s) that are the same as other medications prescribed for you. Read the directions carefully, and ask your doctor or other care provider to review them with you.

## 2018-07-25 NOTE — PROGRESS NOTES
"July 25, 2018    [facility]  [address]  [place] [id number] [phone]  Health Coaching Progress Note    Patient Name: Dee Dee Shepherd Date: July 25, 2018      Session Length: 70      DATA    PRM Master Survey Scores Reviewed: Yes    Core Healthy Days Survey:    Would you say that in general your health is: : Good    LYNNETTE Score (Last Two) 7/25/2018   LYNNETTE Raw Score 30   Activation Score 56   LYNNETTE Level 3       PHQ-2 Score 5/17/2018 3/24/2017   PHQ-2 Total Score Interpretation - Positive if 3 or more points; Administer PHQ-9 if positive 2 2   MyC PHQ-2 Score 2 -       PHQ-9 SCORE 5/17/2018 7/25/2018   Total Score 16 16       Treatment Objective(s) Addressed in This Session:  Target Behavior(s): smoking    Current Stressors / Issues:  Dee Dee wants to quit smoking and yet enjoys it.    What Patient Does Well:   Dee Dee is very open and honest with writer today.  Previous Successes:   Dee Dee has quit smoking in the past on her own.  Areas in Need of Improvement:   Dee Dee talks with writer today about her quit plan and tells writer that she already has Chantix and plans to begin taking it on her birthday this year ([birth year]) and then sets her target quit date for 1 week after that on September 27 and writer thinks this is a great plan. Jailene adds that smoking is such a hand-mouth habit however that she is a bit concerned about this. This may be an area for improvement. Writer shares more information about NRT options that can be used along with Chantix for additional support. Jailene likes the sound of the inhaler or \"puffers\" versus something like the gum or lozenge (due to taste) and may follow up with her provider/insurance on this and will report back next visit.  Barriers to Change:   Stress and habitual smoking times will be the most difficult according to Dee Dee today.  Reasons for Change:   Dee Dee wants to quit smoking for financial reasons and " also for her health.  Plan/Goal for the Next 4 Weeks:     GOAL #1: Begin checking into NRT options to use along with Chantix for support-inhaler or puffer (1st choice) or other  GOAL #1 Progress Toward Goal: 0%  GOAL #2: Begin taking Chantix 9/20/18  GOAL #2 Progress Toward Goal: 0%  GOAL #3: Begin tobacco cessation-target quit date 9/27/2018  GOAL #3 Progress Toward Goal: 0%    Intervention:  Motivational Interviewing    MI Intervention: Expressed Empathy/Understanding, Supported Autonomy, Collaboration, Evocation, Permission to raise concern or advise, Open-ended questions, Reflections: simple and complex, Importance Scale (1-10) 9 Confidence Scale (1-10) 8 , Change talk (evoked) and Reframe     Change Talk Expressed by the Patient: Desire to change Ability to change Reasons to change Committment to change Activation Taking steps    Provider Response to Change Talk: E - Evoked more info from patient about behavior change, A - Affirmed patient's thoughts, decisions, or attempts at behavior change, R - Reflected patient's change talk and S - Summarized patient's change talk statements    Assessment / Progress on Treatment Objective(s) / Homework:    New Objective established this session - PREPARATION (Decided to change - considering how); Intervened by negotiating a change plan and determining options / strategies for behavior change, identifying triggers, exploring social supports, and working towards setting a date to begin behavior change         Plan: (Homework, other):  Patient was encouraged to continue to seek condition-related information and education, as well as schedule a follow up appointment with the Health  in 4 weeks. Patient has set self-identified goals and will monitor progress until the next appointment.  Next visit scheduled for August 22 at 2:30PM.      Robin Gann

## 2018-07-26 ASSESSMENT — PATIENT HEALTH QUESTIONNAIRE - PHQ9: SUM OF ALL RESPONSES TO PHQ QUESTIONS 1-9: 16

## 2018-09-05 ENCOUNTER — ALLIED HEALTH/NURSE VISIT (OUTPATIENT)
Dept: NURSING | Facility: CLINIC | Age: 63
End: 2018-09-05

## 2018-09-05 DIAGNOSIS — Z71.6 ENCOUNTER FOR TOBACCO USE CESSATION COUNSELING: Primary | ICD-10-CM

## 2018-09-05 PROCEDURE — 99207 ZZC HEALTH COACHING, NO CHARGE: CPT

## 2018-09-05 NOTE — MR AVS SNAPSHOT
After Visit Summary   9/5/2018    Dee Dee Shepherd    MRN: [id number]           Patient Information     Date Of Birth          1955        Visit Information        Provider Department      9/5/2018 2:30 PM Robin Gann [place] Julio Bertrand        Today's Diagnoses     Encounter for tobacco use cessation counseling    -  1       Follow-ups after your visit        Follow-up notes from your care team     Return in 2 weeks (on 9/19/2018).      Who to contact     If you have questions or need follow up information about today's clinic visit or your schedule please contact [facility] J[facility]IO BERTRAND directly at [phone].  Normal or non-critical lab and imaging results will be communicated to you by FreshDigitalGrouphart, letter or phone within 4 business days after the clinic has received the results. If you do not hear from us within 7 days, please contact the clinic through FreshDigitalGrouphart or phone. If you have a critical or abnormal lab result, we will notify you by phone as soon as possible.  Submit refill requests through Sweepery or call your pharmacy and they will forward the refill request to us. Please allow 3 business days for your refill to be completed.          Additional Information About Your Visit        MyChart Information     Sweepery gives you secure access to your electronic health record. If you see a primary care provider, you can also send messages to your care team and make appointments. If you have questions, please call your primary care clinic.  If you do not have a primary care provider, please call [phone] and they will assist you.        Care EveryWhere ID     This is your Care EveryWhere ID. This could be used by other organizations to access your [facility] medical records  [id number]         Blood Pressure from Last 3 Encounters:   07/02/18 134/80   06/12/18 136/88   05/22/18 142/84    Weight from Last 3 Encounters:   07/02/18 192 lb 12.8 oz (87.5 kg)   05/22/18 200 lb (90.7 kg)    05/17/18 200 lb 12.8 oz (91.1 kg)              Today, you had the following     No orders found for display       Primary Care Provider Office Phone # Fax #    Dalia Wu,  [phone] [address] [place] [address]        Equal Access to Services     [facility] CHRISSY : Hadii aad ku hadasho Soomaali, waaxda luqadaha, qaybta kaalmada adeegyada, waxay idiin hayaan adeeg john la'aan ah. So [facility] [phone].    ATENCIÓN: Si habla español, tiene a mast disposición servicios gratuitos de asistencia lingüística. [facility] [phone].    We comply with applicable federal civil rights laws and [place] laws. We do not discriminate on the basis of race, color, national origin, age, disability, sex, sexual orientation, or gender identity.            Thank you!     Thank you for choosing [facility]  for your care. Our goal is always to provide you with excellent care. Hearing back from our patients is one way we can continue to improve our services. Please take a few minutes to complete the written survey that you may receive in the mail after your visit with us. Thank you!             Your Updated Medication List - Protect others around you: Learn how to safely use, store and throw away your medicines at www.disposemymeds.org.          This list is accurate as of 9/5/18 10:24 PM.  Always use your most recent med list.                   Brand Name Dispense Instructions for use Diagnosis    Diphenhydramine-APAP (sleep)  MG Tabs           hydrochlorothiazide 25 MG tablet    HYDRODIURIL    90 tablet    TAKE 1 TABLET DAILY (EVERY 24 HOURS)    Essential hypertension with goal blood pressure less than 140/90       sertraline 100 MG tablet    ZOLOFT    270 tablet    TAKE 3 TABLETS (300MG)     DAILY    Major depression in complete remission (H)       simvastatin 40 MG tablet    ZOCOR    90 tablet    TAKE 1 TABLET NIGHTLY    Hyperlipidemia LDL goal <130       * varenicline 1 MG tablet     CHANTIX    56 tablet    Take 1 tablet (1 mg) by mouth 2 times daily    Tobacco abuse       * varenicline 0.5 MG X 11 & 1 MG X 42 tablet    CHANTIX STARTING MONTH VIOLETA    53 tablet    Take 0.5 mg tab daily for 3 days, then 0.5 mg tab twice daily for 4 days, then 1 mg twice daily.    Tobacco abuse       * Notice:  This list has 2 medication(s) that are the same as other medications prescribed for you. Read the directions carefully, and ask your doctor or other care provider to review them with you.

## 2018-09-05 NOTE — PROGRESS NOTES
September 5, 2018    [facility]  [address] [address] [address] [facility]  [place] [address]  [phone]  Health Coaching Progress Note    Patient Name: Dee Dee Shepherd Date: September 5, 2018      Session Length: 30      DATA    PRM Master Survey Scores Reviewed: Yes    Core Healthy Days Survey:         LYNNETTE Score (Last Two) 7/25/2018   LYNNETTE Raw Score 30   Activation Score 56   LYNNETTE Level 3       PHQ-2 Score 5/17/2018 3/24/2017   PHQ-2 Total Score Interpretation - Positive if 3 or more points; Administer PHQ-9 if positive 2 2   MyC PHQ-2 Score 2 -       PHQ-9 SCORE 5/17/2018 7/25/2018   Total Score 16 16       Treatment Objective(s) Addressed in This Session:  Target Behavior(s): smoking    Current Stressors / Issues:  Dee Dee states no stressors today.    What Patient Does Well:   Dee Dee has been talking with others about quitting smoking.  Previous Successes:   Dee Dee is preparing to try to quit smoking and still plans to begin Chantix after her birthday this month and quit on 9/27/18.  Areas in Need of Improvement:   Dee Dee says the area in need of imrovement at this time is checking into NRT for combination therapy to support her in her quit attempt. Dee Dee plans to contact her insurance provider regarding coverage for this in the next week or so.  Barriers to Change:   Dee Dee knows old habits are hard to break and plans to consider how she will change her routine after she quits. She has some ideas for her new routine already but plns to think about this further still.  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  Plan/Goal for the Next 4 Weeks:     GOAL #1: Begin checking into NRT options to use along with Chantix for support-inhaler or puffer (1st choice) or other  GOAL #1 Progress Toward Goal: 25%  GOAL #2: Begin taking Chantix 9/20/18  GOAL #2 Progress Toward Goal: 0%  GOAL #3:  Begin tobacco cessation-target quit date 9/27/2018  GOAL #3 Progress Toward Goal: 0%    Intervention:  Motivational Interviewing    MI Intervention: Expressed Empathy/Understanding, Supported Autonomy, Collaboration, Evocation, Permission to raise concern or advise, Open-ended questions, Reflections: simple and complex, Change talk (evoked) and Reframe     Change Talk Expressed by the Patient: Desire to change Ability to change Reasons to change Committment to change Activation Taking steps    Provider Response to Change Talk: E - Evoked more info from patient about behavior change, A - Affirmed patient's thoughts, decisions, or attempts at behavior change, R - Reflected patient's change talk and S - Summarized patient's change talk statements    Assessment / Progress on Treatment Objective(s) / Homework:    Minimal progress - ACTION (Actively working towards change); Intervened by reinforcing change plan / affirming steps taken         Plan: (Homework, other):  Patient was encouraged to continue to seek condition-related information and education, as well as schedule a follow up appointment with the Health  in 2 weeks. Patient has set self-identified goals and will monitor progress until the next appointment.  Next visit scheduled for September 19 at 2:30PM.      Robin Gann

## 2018-09-18 ENCOUNTER — TELEPHONE (OUTPATIENT)
Dept: FAMILY MEDICINE | Facility: CLINIC | Age: 63
End: 2018-09-18

## 2018-09-18 NOTE — TELEPHONE ENCOUNTER
Panel Management Review      Patient has the following on her problem list:     Depression / Dysthymia review    Measure:  Needs PHQ-9 score of 4 or less during index window.  Administer PHQ-9 and if score is 5 or more, send encounter to provider for next steps.    5 - 7 month window range: 9/17/18-1/17/19    PHQ-9 SCORE 5/17/2018 7/25/2018   Total Score 16 16       If PHQ-9 recheck is 5 or more, route to provider for next steps.    Patient is due for:  PHQ9    Hypertension   Last three blood pressure readings:  BP Readings from Last 3 Encounters:   07/02/18 134/80   06/12/18 136/88   05/22/18 142/84     Blood pressure: Passed    HTN Guidelines:  Age 18-59 BP range:  Less than 140/90  Age 60-85 with Diabetes:  Less than 140/90  Age 60-85 without Diabetes:  less than 150/90      Composite cancer screening  Chart review shows that this patient is due/due soon for the following None  Summary:    Patient is due/failing the following:   PHQ9    Action needed:   Patient needs to do PHQ9.    Type of outreach:    Sent [employer] message.    Questions for provider review:    None                                                                                                                                    Karlene Henriquez CMA       Chart routed to none .

## 2018-10-16 ENCOUNTER — TELEPHONE (OUTPATIENT)
Dept: FAMILY MEDICINE | Facility: CLINIC | Age: 63
End: 2018-10-16

## 2018-10-16 NOTE — TELEPHONE ENCOUNTER
Panel Management Review      Patient has the following on her problem list:     Depression / Dysthymia review    Measure:  Needs PHQ-9 score of 4 or less during index window.  Administer PHQ-9 and if score is 5 or more, send encounter to provider for next steps.    5 - 7 month window range: 9/17/18-1/17/19    PHQ-9 SCORE 5/17/2018 7/25/2018   Total Score 16 16       If PHQ-9 recheck is 5 or more, route to provider for next steps.    Patient is due for:  PHQ9 and DAP    Hypertension   Last three blood pressure readings:  BP Readings from Last 3 Encounters:   07/02/18 134/80   06/12/18 136/88   05/22/18 142/84     Blood pressure: Passed    HTN Guidelines:  Age 18-59 BP range:  Less than 140/90  Age 60-85 with Diabetes:  Less than 140/90  Age 60-85 without Diabetes:  less than 150/90      Composite cancer screening  Chart review shows that this patient is due/due soon for the following None  Summary:    Patient is due/failing the following:   DAP and PHQ9    Action needed:   Patient needs to do PHQ9.    Type of outreach:    Phone, left message for patient to call back.  Mychart sent 9/18/18 has not been read.    Questions for provider review:    None                                                                                                                                    Karlene Henriquez CMA       Chart routed to none .

## 2019-01-15 DIAGNOSIS — F32.5 MAJOR DEPRESSION IN COMPLETE REMISSION (H): ICD-10-CM

## 2019-01-16 NOTE — TELEPHONE ENCOUNTER
"Requested Prescriptions   Pending Prescriptions Disp Refills     sertraline (ZOLOFT) 100 MG tablet 270 tablet 1    Last Written Prescription Date:  5/17/18  Last Fill Quantity: 270,  # refills: 1   Last office visit: 7/2/2018 with prescribing provider:  stefan   Future Office Visit:     Sig: TAKE 3 TABLETS (300MG)     DAILY    SSRIs Protocol Failed - 1/15/2019  4:33 PM       Failed - PHQ-9 score less than 5 in past 6 months    Please review last PHQ-9 score.          Failed - Recent (6 mo) or future (30 days) visit within the authorizing provider's specialty    Patient had office visit in the last 6 months or has a visit in the next 30 days with authorizing provider or within the authorizing provider's specialty.  See \"Patient Info\" tab in inbasket, or \"Choose Columns\" in Meds & Orders section of the refill encounter.           Passed - Medication is active on med list       Passed - Patient is age 18 or older       Passed - No active pregnancy on record       Passed - No positive pregnancy test in last 12 months          "

## 2019-01-17 NOTE — TELEPHONE ENCOUNTER
Routing refill request to provider for review/approval because:  Last PHQ-9 in July was 16. PHQ-9 sent to patient via My Chart. Rachel Andino RN

## 2019-01-18 ENCOUNTER — PATIENT OUTREACH (OUTPATIENT)
Dept: NURSING | Facility: CLINIC | Age: 64
End: 2019-01-18

## 2019-01-18 RX ORDER — SERTRALINE HYDROCHLORIDE 100 MG/1
TABLET, FILM COATED ORAL
Qty: 270 TABLET | Refills: 1 | Status: SHIPPED | OUTPATIENT
Start: 2019-01-18 | End: 2019-10-11

## 2019-02-04 ENCOUNTER — PATIENT OUTREACH (OUTPATIENT)
Dept: NURSING | Facility: CLINIC | Age: 64
End: 2019-02-04

## 2019-05-01 ENCOUNTER — ALLIED HEALTH/NURSE VISIT (OUTPATIENT)
Dept: FAMILY MEDICINE | Facility: CLINIC | Age: 64
End: 2019-05-01
Payer: COMMERCIAL

## 2019-05-01 VITALS — HEART RATE: 68 BPM | DIASTOLIC BLOOD PRESSURE: 72 MMHG | SYSTOLIC BLOOD PRESSURE: 130 MMHG

## 2019-05-01 DIAGNOSIS — I10 ESSENTIAL HYPERTENSION WITH GOAL BLOOD PRESSURE LESS THAN 140/90: Primary | ICD-10-CM

## 2019-05-01 PROCEDURE — 99207 ZZC NO CHARGE NURSE ONLY: CPT | Performed by: FAMILY MEDICINE

## 2019-05-01 NOTE — PROGRESS NOTES
Dee Dee Shepherd was evaluated at [facility] on May 1, 2019 at which time her blood pressure was:    BP Readings from Last 3 Encounters:   05/01/19 130/72   07/02/18 134/80   06/12/18 136/88     Pulse Readings from Last 3 Encounters:   05/01/19 68   07/02/18 80   05/17/18 92       Reviewed lifestyle modifications for blood pressure control and reduction: including making healthy food choices, managing weight, getting regular exercise, smoking cessation, reducing alcohol consumption, monitoring blood pressure regularly.     Symptoms: None    BP Goal:< 140/90 mmHg    BP Assessment:  BP at goal    Potential Reasons for BP too high: Unknown/Other: -    BP Follow-Up Plan: Recheck BP in 6 months at pharmacy    Recommendation to Provider: -    Note completed by: Jadyn Villa RPh  [facility]  [phone]

## 2019-05-06 ENCOUNTER — OFFICE VISIT (OUTPATIENT)
Dept: FAMILY MEDICINE | Facility: CLINIC | Age: 64
End: 2019-05-06
Payer: COMMERCIAL

## 2019-05-06 VITALS
HEART RATE: 76 BPM | BODY MASS INDEX: 33.66 KG/M2 | SYSTOLIC BLOOD PRESSURE: 132 MMHG | WEIGHT: 190 LBS | TEMPERATURE: 97.6 F | DIASTOLIC BLOOD PRESSURE: 86 MMHG | HEIGHT: 63 IN

## 2019-05-06 DIAGNOSIS — F33.2 SEVERE EPISODE OF RECURRENT MAJOR DEPRESSIVE DISORDER, WITHOUT PSYCHOTIC FEATURES (H): Primary | ICD-10-CM

## 2019-05-06 DIAGNOSIS — Z87.891 PERSONAL HISTORY OF TOBACCO USE: ICD-10-CM

## 2019-05-06 DIAGNOSIS — R73.09 ABNORMAL GLUCOSE: ICD-10-CM

## 2019-05-06 DIAGNOSIS — Z82.49 FAMILY HISTORY OF ABDOMINAL AORTIC ANEURYSM (AAA): ICD-10-CM

## 2019-05-06 DIAGNOSIS — M16.11 PRIMARY OSTEOARTHRITIS OF RIGHT HIP: ICD-10-CM

## 2019-05-06 DIAGNOSIS — E78.5 HYPERLIPIDEMIA LDL GOAL <130: ICD-10-CM

## 2019-05-06 DIAGNOSIS — I10 ESSENTIAL HYPERTENSION WITH GOAL BLOOD PRESSURE LESS THAN 140/90: ICD-10-CM

## 2019-05-06 PROCEDURE — 99214 OFFICE O/P EST MOD 30 MIN: CPT | Mod: 25 | Performed by: FAMILY MEDICINE

## 2019-05-06 PROCEDURE — G0296 VISIT TO DETERM LDCT ELIG: HCPCS | Performed by: FAMILY MEDICINE

## 2019-05-06 ASSESSMENT — ANXIETY QUESTIONNAIRES
1. FEELING NERVOUS, ANXIOUS, OR ON EDGE: NEARLY EVERY DAY
6. BECOMING EASILY ANNOYED OR IRRITABLE: NEARLY EVERY DAY
GAD7 TOTAL SCORE: 17
2. NOT BEING ABLE TO STOP OR CONTROL WORRYING: NEARLY EVERY DAY
3. WORRYING TOO MUCH ABOUT DIFFERENT THINGS: NEARLY EVERY DAY
5. BEING SO RESTLESS THAT IT IS HARD TO SIT STILL: NOT AT ALL
7. FEELING AFRAID AS IF SOMETHING AWFUL MIGHT HAPPEN: NEARLY EVERY DAY

## 2019-05-06 ASSESSMENT — PATIENT HEALTH QUESTIONNAIRE - PHQ9
5. POOR APPETITE OR OVEREATING: MORE THAN HALF THE DAYS
SUM OF ALL RESPONSES TO PHQ QUESTIONS 1-9: 17

## 2019-05-06 ASSESSMENT — MIFFLIN-ST. JEOR: SCORE: 1375.96

## 2019-05-06 NOTE — NURSING NOTE
"Initial /86   Pulse 76   Temp 97.6  F (36.4  C) (Tympanic)   Ht 1.6 m (5' 3\")   Wt 86.2 kg (190 lb)   Breastfeeding? No   BMI 33.66 kg/m   Estimated body mass index is 33.66 kg/m  as calculated from the following:    Height as of this encounter: 1.6 m (5' 3\").    Weight as of this encounter: 86.2 kg (190 lb). .      "

## 2019-05-06 NOTE — PATIENT INSTRUCTIONS
For the hip:  You should be getting a call in 1-2 days to schedule with spots medicine.  I would recommend seeing Dr Nunes to discuss an injection of your hip for arthritis.    For the mood;  You'll get a call to help schedule with psychiatry for discussion of your medications.  I think a visit with your EAP is a good idea as well    We'll do some blood work as well.  You will need to be fasting for 12 hours (nothing but water) prior to your blood work.    Please call [phone] to schedule your lung cancer screening and the ultrasound to look for aneurysm.        I will let you know results when I see them    Lung Cancer Screening   Frequently Asked Questions  If you are at high-risk for lung cancer, getting screened with low-dose computed tomography (LDCT) every year can help save your life. This handout offers answers to some of the most common questions about lung cancer screening. If you have other questions, please call [phone] ([phone]).     What is it?  Lung cancer screening uses special X-ray technology to create an image of your lung tissue. The exam is quick and easy and takes less than 10 seconds. We don t give you any medicine or use any needles. You can eat before and after the exam. You don t need to change your clothes as long as the clothing on your chest doesn t contain metal. But, you do need to be able to hold your breath for at least 6 seconds during the exam.    What is the goal of lung cancer screening?  The goal of lung cancer screening is to save lives. Many times, lung cancer is not found until a person starts having physical symptoms. Lung cancer screening can help detect lung cancer in the earliest stages when it may be easier to treat.    Who should be screened for lung cancer?  We suggest lung cancer screening for anyone who is at high-risk for lung cancer. You are in the high-risk group if you:      are between the ages of 55 and 79, and    have smoked at least 1  pack of cigarettes a day for 30 or more years, and    still smoke or have quit within the past 15 years.    However, if you have a new cough or shortness of breath, you should talk to your doctor before being screened.    Some national lung health advocacy groups also recommend screening for people ages 50 to 79 who have smoked an average of 1 pack of cigarettes a day for 20 years. They must also have at least 1 other risk factor for lung cancer, not including exposure to secondhand smoke. Other risk factors are having had cancer in the past, emphysema, pulmonary fibrosis, COPD, a family history of lung cancer, or exposure to certain materials such as arsenic, asbestos, beryllium, cadmium, chromium, diesel fumes, nickel, radon or silica. Your care team can help you know if you have one of these risk factors.     Why does it matter if I have symptoms?  Certain symptoms can be a sign that you have a condition in your lungs that should be checked and treated by your doctor. These symptoms include fever, chest pain, a new or changing cough, shortness of breath that you have never felt before, coughing up blood or unexplained weight loss. Having any of these symptoms can greatly affect the results of lung cancer screening.       Should all smokers get an LDCT lung cancer screening exam?  It depends. Lung cancer screening is for a very specific group of men and women who have a history of heavy smoking over a long period of time (see  Who should be screened for lung cancer  above).  I am in the high-risk group, but have been diagnosed with cancer in the past. Is LDCT lung cancer screening right for me?  In some cases, you should not have LDCT lung screening, such as when your doctor is already following your cancer with CT scan studies. Your doctor will help you decide if LDCT lung screening is right for you.  Do I need to have a screening exam every year?  Yes. If you are in the high-risk group described earlier, you  should get an LDCT lung cancer screening exam every year until you are 79, or are no longer willing or able to undergo screening and possible procedures to diagnose and treat lung cancer.  How effective is LDCT at preventing death from lung cancer?  Studies have shown that LDCT lung cancer screening can lower the risk of death from lung cancer by 20 percent in people who are at high-risk.  What are the risks?  There are some risks and limitations of LDCT lung cancer screening. We want to make sure you understand the risks and benefits, so please let us know if you have any questions. Your doctor may want to talk with you more about these risks.    Radiation exposure: As with any exam that uses radiation, there is a very small increased risk of cancer. The amount of radiation in LDCT is small--about the same amount a person would get from a mammogram. Your doctor orders the exam when he or she feels the potential benefits outweigh the risks.    False negatives: No test is perfect, including LDCT. It is possible that you may have a medical condition, including lung cancer, that is not found during your exam. This is called a false negative result.    False positives and more testing: LDCT very often finds something in the lung that could be cancer, but in fact is not. This is called a false positive result. False positive tests often cause anxiety. To make sure these findings are not cancer, you may need to have more tests. These tests will be done only if you give us permission. Sometimes patients need a treatment that can have side effects, such as a biopsy. For more information on false positives, see  What can I expect from the results?     Findings not related to lung cancer: Your LDCT exam also takes pictures of areas of your body next to your lungs. In a very small number of cases, the CT scan will show an abnormal finding in one of these areas, such as your kidneys, adrenal glands, liver or thyroid. This  finding may not be serious, but you may need more tests. Your doctor can help you decide what other tests you may need, if any.  What can I expect from the results?  About 1 out of 4 LDCT exams will find something that may need more tests. Most of the time, these findings are lung nodules. Lung nodules are very small collections of tissue in the lung. These nodules are very common, and the vast majority--more than 97 percent--are not cancer (benign). Most are normal lymph nodes or small areas of scarring from past infections.  But, if a small lung nodule is found to be cancer, the cancer can be cured more than 90 percent of the time. To know if the nodule is cancer, we may need to get more images before your next yearly screening exam. If the nodule has suspicious features (for example, it is large, has an odd shape or grows over time), we will refer you to a specialist for further testing.  Will my doctor also get the results?  Yes. Your doctor will get a copy of your results.  Is it okay to keep smoking now that there s a cancer screening exam?  No. Tobacco is one of the strongest cancer-causing agents. It causes not only lung cancer, but other cancers and cardiovascular (heart) diseases as well. The damage caused by smoking builds over time. This means that the longer you smoke, the higher your risk of disease. While it is never too late to quit, the sooner you quit, the better.  Where can I find help to quit smoking?  The best way to prevent lung cancer is to stop smoking. If you have already quit smoking, congratulations and keep it up! For help on quitting smoking, please call [employer] at 5-394-392-NTAQ (4601) or the American Cancer Society at 1-636.770.5221 to find local resources near you.  One-on-one health coaching:  If you d prefer to work individually with a health care provider on tobacco cessation, we offer:      Medication Therapy Management:  Our specially trained pharmacists work closely with you  and your doctor to help you quit smoking.  Call [phone] or [phone] (toll free).     Can Do: Health coaching offered by [facility].  [email or web address]

## 2019-05-06 NOTE — PROGRESS NOTES
"  SUBJECTIVE:   Dee Dee Shepherd is a 63 year old female who presents to clinic today for the following   health issues:      Musculoskeletal problem/pain      Duration: 3-4 years    Description  Location: right hip    Intensity:  moderate    Accompanying signs and symptoms: burning sensation in low right back, aching in groin with walking    History  Previous similar problem: no   Previous evaluation:  Has been seen here for this    Precipitating or alleviating factors:  Trauma or overuse: no   Aggravating factors include: standing, walking and overuse    Therapies tried and outcome: acetaminophen    saw sports medicine last year.  Was offered an injection in the hip for OA but declined.  Felt she would ratehr do home exercises.  Now bothering more and would like to pursue additional therapy.    * requesting labs - states she \"does not feel right\" for the last 6 months, trouble focusing    Feels like she is haivng mood swings.   Has a lot of stress at work.  Does not feel like this is a good job for her but has a lot of financial pressures so feels she needs to keep working.  Has been feeling pretty down most of the time.  Denies any suicidal ideation.  States \"killing herself wont's help\".  Feels she is safe.      *  Mom and maternal aunt with AAA.  Worried this might be an issue for her as well.    *  Does use EtOH.  2-3 drinks per week.  Trying to keep it under control.  Has had issue with EtOH in the past but never in treatment.  H/o alcoholism in family.        *  Smoking age 13,  1ppd for years 18-30.  Then 1/2 ppd since 20 years.  Feels she has smoked more then 30 pack years.  Would be interested in lung cancer screening.        Additional history: as documented    Reviewed  and updated as needed this visit by clinical staff  Tobacco  Allergies  Meds  Med Hx  Surg Hx  Fam Hx  Soc Hx        Reviewed and updated as needed this visit by Provider  Tobacco  Meds  Med Hx  Surg Hx  Fam Hx  Soc Hx    "     ROS: Remainder of Constitutional, CV, Respiratory, GI,  negative with exception of that mentioned above    PE:  VS as above   Gen:  WN/WD/WH female in NAD   Heart:  RRR without murmur, nl S1, S2, no rubs or gallops   Lungs CTA ella without rales/ronchi/wheezes   Psych: Alert and oriented times 3; coherent speech, normal   rate and volume, able to articulate logical thoughts, able   to abstract reason, no tangential thoughts, no hallucinations   or delusions  Her affect is mildly depressed.   Ext:  No pedal edema      A/p:      ICD-10-CM    1. Severe episode of recurrent major depressive disorder, without psychotic features (H) F33.2 TSH with free T4 reflex     Vitamin D Deficiency     MENTAL HEALTH REFERRAL  - Adult; Psychiatry and Medication Management; Psychiatry; Other: [facility] [phone]; We will contact you to schedule the appointment or please call with any questions   2. Essential hypertension with goal blood pressure less than 140/90 I10 Comprehensive metabolic panel   3. Hyperlipidemia LDL goal <130 E78.5 Comprehensive metabolic panel     Lipid panel reflex to direct LDL Fasting   4. Primary osteoarthritis of right hip M16.11    5. Family history of abdominal aortic aneurysm (AAA) Z82.49 US Abdominal Aorta Imaging   6. Personal history of tobacco use Z87.891 US Abdominal Aorta Imaging     Prof fee: Shared Decisionmaking for Lung Cancer Screening     CT Chest Lung Cancer Scrn Low Dose wo     Okay for Smoking Cessation Study (PLUTO) to Contact Patient   7. Abnormal glucose R73.09 Hemoglobin A1c     Patient Instructions   For the hip:  You should be getting a call in 1-2 days to schedule with spots medicine.  I would recommend seeing Dr Nunes to discuss an injection of your hip for arthritis.    For the mood;  You'll get a call to help schedule with psychiatry for discussion of your medications.  I think a visit with your EAP is a good idea as well    We'll do some blood work as  well.  You will need to be fasting for 12 hours (nothing but water) prior to your blood work.    Please call [phone] to schedule your lung cancer screening and the ultrasound to look for aneurysm.        I will let you know results when I see them    20 minutes of 25 minute visit spent reviewing past eval for hip pain ans well as mood and options for management as above      Lung Cancer Screening Shared Decision Making Visit     Dee Dee Shepherd is eligible for lung cancer screening on the basis of the information provided in my signed lung cancer screening order.     I have discussed with patient the risks and benefits of screening for lung cancer with low-dose CT.     The risks include:  radiation exposure: one low dose chest CT has as much ionizing radiation as about 15 chest x-rays or 6 months of background radiation living in [place]    false positives: 96% of positive findings/nodules are NOT cancer, but some might still require additional diagnostic evaluation, including biopsy  over-diagnosis: some slow growing cancers that might never have been clinically significant will be detected and treated unnecessarily     The benefit of early detection of lung cancer is contingent upon adherence to annual screening or more frequent follow up if indicated.     Furthermore, reaping the benefits of screening requires Dee Dee Shepherd to be willing and physically able to undergo diagnostic procedures, if indicated. Although no specific guide is available for determining severity of comorbidities, it is reasonable to withhold screening in patients who have greater mortality risk from other diseases.     We did discuss that the only way to prevent lung cancer is to not smoke. Smoking cessation assistance was offered.    I did not offer risk estimation using a calculator such as this one:    ShouldIScreen

## 2019-05-07 ASSESSMENT — ANXIETY QUESTIONNAIRES: GAD7 TOTAL SCORE: 17

## 2019-05-13 ENCOUNTER — HOSPITAL ENCOUNTER (OUTPATIENT)
Dept: ULTRASOUND IMAGING | Facility: CLINIC | Age: 64
Discharge: HOME | End: 2019-05-13
Attending: FAMILY MEDICINE | Admitting: FAMILY MEDICINE
Payer: COMMERCIAL

## 2019-05-13 DIAGNOSIS — Z87.891 PERSONAL HISTORY OF TOBACCO USE: ICD-10-CM

## 2019-05-13 DIAGNOSIS — Z82.49 FAMILY HISTORY OF ABDOMINAL AORTIC ANEURYSM (AAA): ICD-10-CM

## 2019-05-13 PROCEDURE — 76775 US EXAM ABDO BACK WALL LIM: CPT

## 2019-05-13 NOTE — LETTER
May 14, 2019      Dee Dee Shepherd  [address]  [facility] [address]            Dee Dee,       Your ultrasound was normal.  No evidence of aneurysm.  Great news!       Resulted Orders   US Abdominal Aorta Imaging    Narrative    US ABDOMINAL AORTA LIMITED 5/13/2019 3:18 PM    HISTORY: Screening for abdominal aortic aneurysm. Prior tobacco use.    TECHNIQUE: The abdominal aorta is assessed.    COMPARISON: None.    FINDINGS: The abdominal aorta is normal in caliber.      Impression    IMPRESSION: No evidence for an abdominal aortic aneurysm.    RIGOBERTO ZHENG MD       If you have any questions or concerns, please call the clinic at the number listed above.       Sincerely,        Dalia Wu, /ag

## 2019-05-14 ENCOUNTER — MYC MEDICAL ADVICE (OUTPATIENT)
Dept: FAMILY MEDICINE | Facility: CLINIC | Age: 64
End: 2019-05-14

## 2019-05-17 ENCOUNTER — TELEPHONE (OUTPATIENT)
Dept: FAMILY MEDICINE | Facility: CLINIC | Age: 64
End: 2019-05-17

## 2019-05-17 DIAGNOSIS — I10 ESSENTIAL HYPERTENSION WITH GOAL BLOOD PRESSURE LESS THAN 140/90: ICD-10-CM

## 2019-05-17 DIAGNOSIS — R73.09 ABNORMAL GLUCOSE: ICD-10-CM

## 2019-05-17 DIAGNOSIS — E78.5 HYPERLIPIDEMIA LDL GOAL <130: ICD-10-CM

## 2019-05-17 DIAGNOSIS — F33.2 SEVERE EPISODE OF RECURRENT MAJOR DEPRESSIVE DISORDER, WITHOUT PSYCHOTIC FEATURES (H): ICD-10-CM

## 2019-05-17 DIAGNOSIS — E87.6 HYPOKALEMIA: Primary | ICD-10-CM

## 2019-05-17 LAB
ALBUMIN SERPL-MCNC: 4.2 G/DL (ref 3.4–5)
ALP SERPL-CCNC: 119 U/L (ref 40–150)
ALT SERPL W P-5'-P-CCNC: 18 U/L (ref 0–50)
ANION GAP SERPL CALCULATED.3IONS-SCNC: 8 MMOL/L (ref 3–14)
AST SERPL W P-5'-P-CCNC: 15 U/L (ref 0–45)
BILIRUB SERPL-MCNC: 0.4 MG/DL (ref 0.2–1.3)
BUN SERPL-MCNC: 26 MG/DL (ref 7–30)
CALCIUM SERPL-MCNC: 9.3 MG/DL (ref 8.5–10.1)
CHLORIDE SERPL-SCNC: 100 MMOL/L (ref 94–109)
CHOLEST SERPL-MCNC: 164 MG/DL
CO2 SERPL-SCNC: 27 MMOL/L (ref 20–32)
CREAT SERPL-MCNC: 0.86 MG/DL (ref 0.52–1.04)
GFR SERPL CREATININE-BSD FRML MDRD: 71 ML/MIN/{1.73_M2}
GLUCOSE SERPL-MCNC: 100 MG/DL (ref 70–99)
HBA1C MFR BLD: 5.5 % (ref 0–5.6)
HDLC SERPL-MCNC: 49 MG/DL
LDLC SERPL CALC-MCNC: 90 MG/DL
NONHDLC SERPL-MCNC: 115 MG/DL
POTASSIUM SERPL-SCNC: 2.7 MMOL/L (ref 3.4–5.3)
PROT SERPL-MCNC: 7.8 G/DL (ref 6.8–8.8)
SODIUM SERPL-SCNC: 135 MMOL/L (ref 133–144)
T4 FREE SERPL-MCNC: 0.89 NG/DL (ref 0.76–1.46)
TRIGL SERPL-MCNC: 123 MG/DL
TSH SERPL DL<=0.005 MIU/L-ACNC: 4.64 MU/L (ref 0.4–4)

## 2019-05-17 PROCEDURE — 82306 VITAMIN D 25 HYDROXY: CPT | Performed by: FAMILY MEDICINE

## 2019-05-17 PROCEDURE — 83036 HEMOGLOBIN GLYCOSYLATED A1C: CPT | Performed by: FAMILY MEDICINE

## 2019-05-17 PROCEDURE — 80053 COMPREHEN METABOLIC PANEL: CPT | Performed by: FAMILY MEDICINE

## 2019-05-17 PROCEDURE — 80061 LIPID PANEL: CPT | Performed by: FAMILY MEDICINE

## 2019-05-17 PROCEDURE — 84443 ASSAY THYROID STIM HORMONE: CPT | Performed by: FAMILY MEDICINE

## 2019-05-17 PROCEDURE — 84439 ASSAY OF FREE THYROXINE: CPT | Performed by: FAMILY MEDICINE

## 2019-05-17 PROCEDURE — 36415 COLL VENOUS BLD VENIPUNCTURE: CPT | Performed by: FAMILY MEDICINE

## 2019-05-17 RX ORDER — POTASSIUM CHLORIDE 1500 MG/1
20 TABLET, EXTENDED RELEASE ORAL DAILY
Qty: 30 TABLET | Refills: 0 | Status: SHIPPED | OUTPATIENT
Start: 2019-05-17 | End: 2019-05-30

## 2019-05-17 NOTE — TELEPHONE ENCOUNTER
Called and spoke with pt.  Potassium very low on labs today.  Previously normal on hydrochlorothiazide for HTN which she has been on long term.    She has been feeling fine.  No cramping, palpitaions. A bit tired today but under a lot of stress.    Reviewed very low potassium and need for supplementation.  Reviewed urgency of supplementation, pt agrees to  supplement now.    Script sent to pt's requested pharmacy.  She will  now and begin right away.    Reviewed signs and symptoms that should prompt ED eval, she verbalized understanding.    Plan recheck potassium early Monday.  Lab order placed    Dalia Wu

## 2019-05-18 LAB — DEPRECATED CALCIDIOL+CALCIFEROL SERPL-MC: 11 UG/L (ref 20–75)

## 2019-05-20 DIAGNOSIS — E87.6 HYPOKALEMIA: ICD-10-CM

## 2019-05-20 DIAGNOSIS — E87.6 HYPOKALEMIA: Primary | ICD-10-CM

## 2019-05-20 LAB — POTASSIUM SERPL-SCNC: 3.1 MMOL/L (ref 3.4–5.3)

## 2019-05-20 PROCEDURE — 36415 COLL VENOUS BLD VENIPUNCTURE: CPT | Performed by: FAMILY MEDICINE

## 2019-05-20 PROCEDURE — 84132 ASSAY OF SERUM POTASSIUM: CPT | Performed by: FAMILY MEDICINE

## 2019-05-24 DIAGNOSIS — I10 ESSENTIAL HYPERTENSION WITH GOAL BLOOD PRESSURE LESS THAN 140/90: ICD-10-CM

## 2019-05-24 DIAGNOSIS — E78.5 HYPERLIPIDEMIA LDL GOAL <130: ICD-10-CM

## 2019-05-24 DIAGNOSIS — E87.6 HYPOKALEMIA: ICD-10-CM

## 2019-05-24 NOTE — TELEPHONE ENCOUNTER
"Requested Prescriptions   Pending Prescriptions Disp Refills     hydrochlorothiazide (HYDRODIURIL) 25 MG tablet 90 tablet 3     Sig: TAKE 1 TABLET DAILY (EVERY 24 HOURS)  Last Written Prescription Date:  05/17/2018 #90 x 3  Last filled - not provided  Last office visit: 5/6/2019 JESSEE Wu     Future Office Visit:   Next 5 appointments (look out 90 days)    May 28, 2019  2:30 PM CDT  Lab visit with  LAB  [facility] ([facility]) [address] [address] [phone]                Diuretics (Including Combos) Protocol Failed - 5/24/2019 11:19 AM        Failed - Normal serum potassium on file in past 12 months     Recent Labs   Lab Test 05/20/19  0823   POTASSIUM 3.1*                    Passed - Blood pressure under 140/90 in past 12 months     BP Readings from Last 3 Encounters:   05/06/19 132/86   05/01/19 130/72   07/02/18 134/80                 Passed - Recent (12 mo) or future (30 days) visit within the authorizing provider's specialty     Patient had office visit in the last 12 months or has a visit in the next 30 days with authorizing provider or within the authorizing provider's specialty.  See \"Patient Info\" tab in inbasket, or \"Choose Columns\" in Meds & Orders section of the refill encounter.              Passed - Medication is active on med list        Passed - Patient is age 18 or older        Passed - No active pregancy on record        Passed - Normal serum creatinine on file in past 12 months     Recent Labs   Lab Test 05/17/19  0754   CR 0.86              Passed - Normal serum sodium on file in past 12 months     Recent Labs   Lab Test 05/17/19  0754                 Passed - No positive pregnancy test in past 12 months        simvastatin (ZOCOR) 40 MG tablet 90 tablet 3     Sig: TAKE 1 TABLET NIGHTLY  Last Written Prescription Date:  05/17/2018 #90 x 3  Last filled - not provided  Last office visit: 5/6/2019 JESSEE Wu     Future Office " "Visit:   Next 5 appointments (look out 90 days)    May 28, 2019  2:30 PM CDT  Lab visit with [facility]  [facility] ([facility]) [address] [id number] [phone]                Statins Protocol Passed - 5/24/2019 11:19 AM        Passed - LDL on file in past 12 months     Recent Labs   Lab Test 05/17/19  0754   LDL 90             Passed - No abnormal creatine kinase in past 12 months     No lab results found.             Passed - Recent (12 mo) or future (30 days) visit within the authorizing provider's specialty     Patient had office visit in the last 12 months or has a visit in the next 30 days with authorizing provider or within the authorizing provider's specialty.  See \"Patient Info\" tab in inbasket, or \"Choose Columns\" in Meds & Orders section of the refill encounter.              Passed - Medication is active on med list        Passed - Patient is age 18 or older        Passed - No active pregnancy on record        Passed - No positive pregnancy test in past 12 months          "

## 2019-05-28 DIAGNOSIS — E87.6 HYPOKALEMIA: ICD-10-CM

## 2019-05-28 LAB — POTASSIUM SERPL-SCNC: 3.4 MMOL/L (ref 3.4–5.3)

## 2019-05-28 PROCEDURE — 84132 ASSAY OF SERUM POTASSIUM: CPT | Performed by: FAMILY MEDICINE

## 2019-05-28 PROCEDURE — 36415 COLL VENOUS BLD VENIPUNCTURE: CPT | Performed by: FAMILY MEDICINE

## 2019-05-28 RX ORDER — SIMVASTATIN 40 MG
TABLET ORAL
Qty: 90 TABLET | Refills: 3 | Status: SHIPPED | OUTPATIENT
Start: 2019-05-28 | End: 2020-06-12

## 2019-05-28 NOTE — TELEPHONE ENCOUNTER
The HCTZ request should wait as pt is scheduled for lab appt today, 5/28/19 at 2:30pm.    Prescription for simvastatin approved per [facility] Refill Protocol.    Dacia GODINEZ RN

## 2019-05-29 ENCOUNTER — OFFICE VISIT (OUTPATIENT)
Dept: ORTHOPEDICS | Facility: CLINIC | Age: 64
End: 2019-05-29
Payer: COMMERCIAL

## 2019-05-29 VITALS
WEIGHT: 185 LBS | DIASTOLIC BLOOD PRESSURE: 73 MMHG | BODY MASS INDEX: 32.78 KG/M2 | SYSTOLIC BLOOD PRESSURE: 154 MMHG | HEIGHT: 63 IN

## 2019-05-29 DIAGNOSIS — M16.11 PRIMARY OSTEOARTHRITIS OF RIGHT HIP: ICD-10-CM

## 2019-05-29 DIAGNOSIS — M25.551 CHRONIC RIGHT HIP PAIN: Primary | ICD-10-CM

## 2019-05-29 DIAGNOSIS — G89.29 CHRONIC RIGHT HIP PAIN: Primary | ICD-10-CM

## 2019-05-29 PROCEDURE — 20611 DRAIN/INJ JOINT/BURSA W/US: CPT | Mod: RT | Performed by: FAMILY MEDICINE

## 2019-05-29 PROCEDURE — 99213 OFFICE O/P EST LOW 20 MIN: CPT | Mod: 25 | Performed by: FAMILY MEDICINE

## 2019-05-29 RX ORDER — TRIAMCINOLONE ACETONIDE 40 MG/ML
40 INJECTION, SUSPENSION INTRA-ARTICULAR; INTRAMUSCULAR
Status: DISCONTINUED | OUTPATIENT
Start: 2019-05-29 | End: 2022-04-30 | Stop reason: ALTCHOICE

## 2019-05-29 RX ORDER — ROPIVACAINE HYDROCHLORIDE 5 MG/ML
3 INJECTION, SOLUTION EPIDURAL; INFILTRATION; PERINEURAL
Status: DISCONTINUED | OUTPATIENT
Start: 2019-05-29 | End: 2022-04-30 | Stop reason: ALTCHOICE

## 2019-05-29 RX ADMIN — TRIAMCINOLONE ACETONIDE 40 MG: 40 INJECTION, SUSPENSION INTRA-ARTICULAR; INTRAMUSCULAR at 16:30

## 2019-05-29 RX ADMIN — ROPIVACAINE HYDROCHLORIDE 3 ML: 5 INJECTION, SOLUTION EPIDURAL; INFILTRATION; PERINEURAL at 16:30

## 2019-05-29 ASSESSMENT — MIFFLIN-ST. JEOR: SCORE: 1353.28

## 2019-05-29 NOTE — PROGRESS NOTES
"Dee Dee Shepherd  :  1955  DOS: 2019  MRN: [id number]    Sports Medicine Clinic Visit    PCP: Dalia Wu    Dee Dee Shepherd is a 63 year old female who is seen in follow-up for Dr Staples, at the request of Dr Wu presenting with chronic right hip and radiating low back pain.    Injury: Gradual onset of chronic radiating right hip and low back pain over the past 3 - 4 years, worsening over the last ~ 3 months.  Pain located over right deep anterior, lateral and posterior hip, right lower lumbar spine, radiating to lateral anterior thigh.  Reports intermittent radiating, burning pain to right lateral thigh.  Additional Features:  Positive: grinding, weakness and decreased hip ROM.  Symptoms are better with Tylenol and Rest.  Symptoms are worse with: going from sit to stand, walking, donning socks, lying on either side, heavy lifting.  Other evaluation and/or treatments so far consists of: Tylenol, Ibuprofen, Rest and PCP, Sports Med (Dr Staples), HEP.  Recent imaging completed: X-rays completed 3/24/17.  Prior History of related problems: Chronic right hip pain.  Patient was last seen by Dr Staples on 18, decided on trial of HEP at that time, which provided minimal relief.    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    Past Medical History:   Diagnosis Date     Anxiety      Chronic low back pain      Depressive disorder      Hyperlipidemia      Hypertension      Malignant neoplasm of breast (female) (H)     right     Pap smear abnormality of cervix with cytologic evidence of malignancy      Tobacco abuse      Past Surgical History:   Procedure Laterality Date     C LIGATE FALLOPIAN TUBE       MASTECTOMY         Objective  /73   Ht 1.6 m (5' 3\")   Wt 83.9 kg (185 lb)  "  BMI 32.77 kg/m      General: healthy, alert and in no distress    HEENT: no scleral icterus or conjunctival erythema   Skin: no suspicious lesions or rash. No jaundice.   CV: regular rhythm by palpation, 2+ distal pulses, no pedal edema    Resp: normal respiratory effort without conversational dyspnea   Psych: normal mood and affect    Gait: antalgic, appropriate coordination and balance   Neuro: normal light touch sensory exam of the extremities. Motor strength as noted below     Right hip exam    Inspection:        no edema or ecchymosis in hip area    ROM:       Decreased active and passive ROM with flexion, extension, IR, ER, abduction    Strength:        Mildly limited resisted hip flexion due to pain, mild b/l deconditioning of hip stabilizers in general    Tender:        greater trochanter       SI joint       Right lower lumbar paraspinals> left    Non Tender:        remainder of hip area       illiac crest       ASIS    Sensation:        grossly intact in hip and thigh    Skin:       well perfused       capillary refill brisk    Special Tests:        equivocal MARQUISE       positive (+) FADIR       positive (+) scour       equivocal Denice       Neg SLR, slump for clear radicular sx    Radiology  XR PELVIS AND HIP RIGHT 1 VIEW  3/24/2017 4:15 PM     HISTORY:  Pain in right hip     COMPARISON:  None.                                                                   IMPRESSION:  Moderate to advanced degenerative changes of both hips.  Significant superolateral joint space narrowing bilaterally, left  greater than right. No acute process.     Large Joint Injection/Arthocentesis: R hip joint  Date/Time: 5/29/2019 4:30 PM  Performed by: Sukhdev Nunes DO  Authorized by: Sukhdev Nunes DO     Indications:  Pain and diagnostic evaluation  Needle Size:  22 G  Guidance: ultrasound    Approach:  Anterior  Location:  Hip      Site:  R hip joint  Medications:  40 mg triamcinolone 40 MG/ML; 3 mL  ropivacaine 5 MG/ML  Outcome:  Tolerated well, no immediate complications  Procedure discussed: discussed risks, benefits, and alternatives    Consent Given by:  Patient  Timeout: timeout called immediately prior to procedure    Prep: patient was prepped and draped in usual sterile fashion     4 ml's of 1% lidocaine was used as local anesthetic prior to injection      Assessment:  1. Chronic right hip pain    2. Primary osteoarthritis of right hip        Plan:  Discussed the assessment with the patient.  Follow up: prn based on clinical progress  Suspect lumbar complaints are more compensatory, hip appears to be the primary pain generator  XR images independently visualized and reviewed with patient today in clinic  US guide IA hip CSI today, good initial relief from anesthetic effect  Low impact activity options reviewed, she will look into silver sneakers availability for access to swimming/water aerobics  PT available anytime as well  Expectations and goals of CSI reviewed  Often 2-3 days for steroid effect, and can take up to two weeks for maximum effect  We discussed modified progressive pain-free activity as tolerated  Do not overuse in first two weeks if feeling better due to concern for vulnerability while steroid is working  Supportive care reviewed  All questions were answered today  Contact us with additional questions or concerns  Signs and sx of concern reviewed    Thanks very much for sending this nice lady to us, I will keep you updated with her progress      Sukhdev Nunes DO, CAERIN  Primary Care Sports Medicine  [facility]               Disclaimer: This note consists of symbols derived from keyboarding, dictation and/or voice recognition software. As a result, there may be errors in the script that have gone undetected. Please consider this when interpreting information found in this chart.

## 2019-05-29 NOTE — LETTER
2019         RE: Dee Dee Shepherd  [address]  [place] [address]        Dear Colleague,    Thank you for referring your patient, Dee Dee Shepherd, to the [facility]. Please see a copy of my visit note below.    Dee Dee Shepherd  :  1955  DOS: 2019  MRN: [id number]    Sports Medicine Clinic Visit    PCP: Dalia Wu    Dee Dee Shepherd is a 63 year old female who is seen in follow-up for Dr Staples, at the request of Dr Wu presenting with chronic right hip and radiating low back pain.    Injury: Gradual onset of chronic radiating right hip and low back pain over the past 3 - 4 years, worsening over the last ~ 3 months.  Pain located over right deep anterior, lateral and posterior hip, right lower lumbar spine, radiating to lateral anterior thigh.  Reports intermittent radiating, burning pain to right lateral thigh.  Additional Features:  Positive: grinding, weakness and decreased hip ROM.  Symptoms are better with Tylenol and Rest.  Symptoms are worse with: going from sit to stand, walking, donning socks, lying on either side, heavy lifting.  Other evaluation and/or treatments so far consists of: Tylenol, Ibuprofen, Rest and PCP, Sports Med (Dr Staples), HEP.  Recent imaging completed: X-rays completed 3/24/17.  Prior History of related problems: Chronic right hip pain.  Patient was last seen by Dr Staples on 18, decided on trial of HEP at that time, which provided minimal relief.    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    Past Medical History:   Diagnosis Date     Anxiety      Chronic low back pain      Depressive disorder      Hyperlipidemia      Hypertension      Malignant neoplasm of breast (female) (H)      "right     Pap smear abnormality of cervix with cytologic evidence of malignancy      Tobacco abuse      Past Surgical History:   Procedure Laterality Date     C LIGATE FALLOPIAN TUBE       MASTECTOMY         Objective  /73   Ht 1.6 m (5' 3\")   Wt 83.9 kg (185 lb)   BMI 32.77 kg/m       General: healthy, alert and in no distress    HEENT: no scleral icterus or conjunctival erythema   Skin: no suspicious lesions or rash. No jaundice.   CV: regular rhythm by palpation, 2+ distal pulses, no pedal edema    Resp: normal respiratory effort without conversational dyspnea   Psych: normal mood and affect    Gait: antalgic, appropriate coordination and balance   Neuro: normal light touch sensory exam of the extremities. Motor strength as noted below     Right hip exam    Inspection:        no edema or ecchymosis in hip area    ROM:       Decreased active and passive ROM with flexion, extension, IR, ER, abduction    Strength:        Mildly limited resisted hip flexion due to pain, mild b/l deconditioning of hip stabilizers in general    Tender:        greater trochanter       SI joint       Right lower lumbar paraspinals> left    Non Tender:        remainder of hip area       illiac crest       ASIS    Sensation:        grossly intact in hip and thigh    Skin:       well perfused       capillary refill brisk    Special Tests:        equivocal MARQUISE       positive (+) FADIR       positive (+) scour       equivocal Denice       Neg SLR, slump for clear radicular sx    Radiology  XR PELVIS AND HIP RIGHT 1 VIEW  3/24/2017 4:15 PM     HISTORY:  Pain in right hip     COMPARISON:  None.                                                                   IMPRESSION:  Moderate to advanced degenerative changes of both hips.  Significant superolateral joint space narrowing bilaterally, left  greater than right. No acute process.     Large Joint Injection/Arthocentesis: R hip joint  Date/Time: 5/29/2019 4:30 PM  Performed by: Des" Sukhdev Chavez DO  Authorized by: Sukhdev Nunes DO     Indications:  Pain and diagnostic evaluation  Needle Size:  22 G  Guidance: ultrasound    Approach:  Anterior  Location:  Hip      Site:  R hip joint  Medications:  40 mg triamcinolone 40 MG/ML; 3 mL ropivacaine 5 MG/ML  Outcome:  Tolerated well, no immediate complications  Procedure discussed: discussed risks, benefits, and alternatives    Consent Given by:  Patient  Timeout: timeout called immediately prior to procedure    Prep: patient was prepped and draped in usual sterile fashion     4 ml's of 1% lidocaine was used as local anesthetic prior to injection      Assessment:  1. Chronic right hip pain    2. Primary osteoarthritis of right hip        Plan:  Discussed the assessment with the patient.  Follow up: prn based on clinical progress  Suspect lumbar complaints are more compensatory, hip appears to be the primary pain generator  XR images independently visualized and reviewed with patient today in clinic  US guide IA hip CSI today, good initial relief from anesthetic effect  Low impact activity options reviewed, she will look into silver sneakers availability for access to swimming/water aerobics  PT available anytime as well  Expectations and goals of CSI reviewed  Often 2-3 days for steroid effect, and can take up to two weeks for maximum effect  We discussed modified progressive pain-free activity as tolerated  Do not overuse in first two weeks if feeling better due to concern for vulnerability while steroid is working  Supportive care reviewed  All questions were answered today  Contact us with additional questions or concerns  Signs and sx of concern reviewed    Thanks very much for sending this nice lady to us, I will keep you updated with her progress      Sukhdev Nunes DO, CAQ  Primary Care Sports Medicine  [facility]               Disclaimer: This note consists of symbols derived from keyboarding, dictation  and/or voice recognition software. As a result, there may be errors in the script that have gone undetected. Please consider this when interpreting information found in this chart.    Again, thank you for allowing me to participate in the care of your patient.        Sincerely,        Sukhdev Nunes, DO

## 2019-05-30 RX ORDER — POTASSIUM CHLORIDE 1500 MG/1
20 TABLET, EXTENDED RELEASE ORAL DAILY
Qty: 90 TABLET | Refills: 1 | Status: SHIPPED | OUTPATIENT
Start: 2019-05-30 | End: 2019-06-13

## 2019-05-30 RX ORDER — HYDROCHLOROTHIAZIDE 25 MG/1
TABLET ORAL
Qty: 90 TABLET | Refills: 1 | Status: SHIPPED | OUTPATIENT
Start: 2019-05-30 | End: 2019-11-11

## 2019-05-30 NOTE — TELEPHONE ENCOUNTER
hydrochlorothiazide and potassium supplement refilled.  Pt should continue both.  Please call and let her know.  Dalia Wu

## 2019-06-13 ENCOUNTER — HOSPITAL ENCOUNTER (OUTPATIENT)
Dept: CT IMAGING | Facility: CLINIC | Age: 64
Discharge: HOME | End: 2019-06-13
Attending: FAMILY MEDICINE | Admitting: FAMILY MEDICINE
Payer: COMMERCIAL

## 2019-06-13 ENCOUNTER — HOSPITAL ENCOUNTER (OUTPATIENT)
Dept: MAMMOGRAPHY | Facility: CLINIC | Age: 64
End: 2019-06-13
Attending: FAMILY MEDICINE
Payer: COMMERCIAL

## 2019-06-13 DIAGNOSIS — Z12.31 VISIT FOR SCREENING MAMMOGRAM: ICD-10-CM

## 2019-06-13 DIAGNOSIS — E87.6 HYPOKALEMIA: ICD-10-CM

## 2019-06-13 DIAGNOSIS — Z87.891 PERSONAL HISTORY OF TOBACCO USE: ICD-10-CM

## 2019-06-13 PROCEDURE — 77067 SCR MAMMO BI INCL CAD: CPT

## 2019-06-13 PROCEDURE — G0297 LDCT FOR LUNG CA SCREEN: HCPCS

## 2019-06-13 RX ORDER — POTASSIUM CHLORIDE 1500 MG/1
TABLET, EXTENDED RELEASE ORAL
Qty: 90 TABLET | Refills: 0 | Status: SHIPPED | OUTPATIENT
Start: 2019-06-13 | End: 2019-09-03

## 2019-06-13 NOTE — TELEPHONE ENCOUNTER
"Requested Prescriptions   Pending Prescriptions Disp Refills     KLOR-CON 20 MEQ CR tablet [Pharmacy Med Name: KLOR-CON M20 TABLET] 30 tablet 0     Sig: TAKE 1 TABLET BY MOUTH EVERY DAY  Last Written Prescription Date: 05/30/2019 #90 x 1  Last filled 05/17/2019  Last office visit: 5/6/2019 JESSEE Wu   Future Office Visit:  None         Potassium Supplements Protocol Passed - 6/13/2019  1:25 AM        Passed - Recent (12 mo) or future (30 days) visit within the authorizing provider's specialty     Patient had office visit in the last 12 months or has a visit in the next 30 days with authorizing provider or within the authorizing provider's specialty.  See \"Patient Info\" tab in inbasket, or \"Choose Columns\" in Meds & Orders section of the refill encounter.              Passed - Medication is active on med list        Passed - Patient is age 18 or older        Passed - Normal serum potassium in past 12 months     Recent Labs   Lab Test 05/28/19  1447   POTASSIUM 3.4                      "

## 2019-06-19 ENCOUNTER — PATIENT OUTREACH (OUTPATIENT)
Dept: NURSING | Facility: CLINIC | Age: 64
End: 2019-06-19

## 2019-06-20 ENCOUNTER — MYC MEDICAL ADVICE (OUTPATIENT)
Dept: FAMILY MEDICINE | Facility: CLINIC | Age: 64
End: 2019-06-20

## 2019-06-20 ENCOUNTER — TELEPHONE (OUTPATIENT)
Dept: FAMILY MEDICINE | Facility: CLINIC | Age: 64
End: 2019-06-20

## 2019-06-20 NOTE — TELEPHONE ENCOUNTER
Panel Management Review      Patient has the following on her problem list:     Hypertension   Last three blood pressure readings:  BP Readings from Last 3 Encounters:   05/29/19 154/73   05/06/19 132/86   05/01/19 130/72     Blood pressure: FAILED    HTN Guidelines:  Less than 140/90      Composite cancer screening  Chart review shows that this patient is due/due soon for the following None  Summary:    Patient is due/failing the following:   BP CHECK    Action needed:   Patient needs nurse only appointment.    Type of outreach:    Sent [employer] Mobile message.    Questions for provider review:    None                                                                                                                                    Karlene Henriquez CMA       Chart routed to none .

## 2019-06-26 ENCOUNTER — ALLIED HEALTH/NURSE VISIT (OUTPATIENT)
Dept: FAMILY MEDICINE | Facility: CLINIC | Age: 64
End: 2019-06-26
Payer: COMMERCIAL

## 2019-06-26 VITALS — DIASTOLIC BLOOD PRESSURE: 76 MMHG | HEART RATE: 64 BPM | SYSTOLIC BLOOD PRESSURE: 128 MMHG

## 2019-06-26 DIAGNOSIS — Z01.30 BP CHECK: Primary | ICD-10-CM

## 2019-06-26 PROCEDURE — 99207 ZZC NO CHARGE NURSE ONLY: CPT

## 2019-09-03 ENCOUNTER — MYC REFILL (OUTPATIENT)
Dept: FAMILY MEDICINE | Facility: CLINIC | Age: 64
End: 2019-09-03

## 2019-09-03 DIAGNOSIS — E87.6 HYPOKALEMIA: ICD-10-CM

## 2019-09-03 RX ORDER — POTASSIUM CHLORIDE 1500 MG/1
20 TABLET, EXTENDED RELEASE ORAL DAILY
Qty: 90 TABLET | Refills: 0 | Status: SHIPPED | OUTPATIENT
Start: 2019-09-03 | End: 2019-11-11

## 2019-10-01 ENCOUNTER — HEALTH MAINTENANCE LETTER (OUTPATIENT)
Age: 64
End: 2019-10-01

## 2019-10-08 DIAGNOSIS — F32.5 MAJOR DEPRESSION IN COMPLETE REMISSION (H): ICD-10-CM

## 2019-10-11 RX ORDER — SERTRALINE HYDROCHLORIDE 100 MG/1
TABLET, FILM COATED ORAL
Qty: 270 TABLET | Refills: 1 | Status: SHIPPED | OUTPATIENT
Start: 2019-10-11 | End: 2020-04-08

## 2019-10-11 NOTE — TELEPHONE ENCOUNTER
"Requested Prescriptions   Pending Prescriptions Disp Refills     sertraline (ZOLOFT) 100 MG tablet 270 tablet 1     Sig: TAKE 3 TABLETS (300MG)     DAILY  Last Written Prescription Date:  01/18/2019 #270 x 1  Last filled - not provided  Last office visit: 05/06/2019 JESSEE Wu     Future Office Visit:   Next 5 appointments (look out 90 days)    Oct 28, 2019  3:00 PM CDT  PHYSICAL with Dalia uW,   [facility] ([facility]) [address] [id number] [phone]                SSRIs Protocol Failed - 10/11/2019  9:45 AM        Failed - PHQ-9 score less than 5 in past 6 months     Please review last PHQ-9 score.   PHQ-9 SCORE 5/17/2018 7/25/2018 5/6/2019   PHQ-9 Total Score 16 16 17       TAMIKO-7 SCORE 5/6/2019   Total Score 17                 Passed - Medication is active on med list        Passed - Patient is age 18 or older        Passed - No active pregnancy on record        Passed - No positive pregnancy test in last 12 months        Passed - Recent (6 mo) or future (30 days) visit within the authorizing provider's specialty     Patient had office visit in the last 6 months or has a visit in the next 30 days with authorizing provider or within the authorizing provider's specialty.  See \"Patient Info\" tab in inbasket, or \"Choose Columns\" in Meds & Orders section of the refill encounter.              "

## 2019-10-11 NOTE — TELEPHONE ENCOUNTER
Routing refill request to provider for review/approval because:  PHQ-9 out of protocol but patient has an appointment with you this month . Oct 28. Rachel Andino RN

## 2019-10-28 ASSESSMENT — ENCOUNTER SYMPTOMS
CHILLS: 0
COUGH: 0
CONSTIPATION: 0
NAUSEA: 0
MYALGIAS: 1
DIARRHEA: 0
WEAKNESS: 1
DIZZINESS: 1
JOINT SWELLING: 0
BREAST MASS: 0
ABDOMINAL PAIN: 0
HEMATOCHEZIA: 0
NERVOUS/ANXIOUS: 1
SHORTNESS OF BREATH: 1
PARESTHESIAS: 0
FREQUENCY: 0
SORE THROAT: 0
FEVER: 0
PALPITATIONS: 0
HEADACHES: 0
HEMATURIA: 0
ARTHRALGIAS: 1
DYSURIA: 0
HEARTBURN: 0

## 2019-10-28 ASSESSMENT — PATIENT HEALTH QUESTIONNAIRE - PHQ9
SUM OF ALL RESPONSES TO PHQ QUESTIONS 1-9: 8
SUM OF ALL RESPONSES TO PHQ QUESTIONS 1-9: 8
10. IF YOU CHECKED OFF ANY PROBLEMS, HOW DIFFICULT HAVE THESE PROBLEMS MADE IT FOR YOU TO DO YOUR WORK, TAKE CARE OF THINGS AT HOME, OR GET ALONG WITH OTHER PEOPLE: SOMEWHAT DIFFICULT

## 2019-10-28 ASSESSMENT — ACTIVITIES OF DAILY LIVING (ADL): CURRENT_FUNCTION: NO ASSISTANCE NEEDED

## 2019-10-29 ASSESSMENT — PATIENT HEALTH QUESTIONNAIRE - PHQ9: SUM OF ALL RESPONSES TO PHQ QUESTIONS 1-9: 8

## 2019-11-11 ENCOUNTER — OFFICE VISIT (OUTPATIENT)
Dept: FAMILY MEDICINE | Facility: CLINIC | Age: 64
End: 2019-11-11
Payer: COMMERCIAL

## 2019-11-11 VITALS
HEART RATE: 68 BPM | TEMPERATURE: 97.1 F | BODY MASS INDEX: 33.81 KG/M2 | RESPIRATION RATE: 12 BRPM | SYSTOLIC BLOOD PRESSURE: 152 MMHG | DIASTOLIC BLOOD PRESSURE: 90 MMHG | WEIGHT: 190.8 LBS | HEIGHT: 63 IN

## 2019-11-11 DIAGNOSIS — G47.30 SLEEP APNEA, UNSPECIFIED TYPE: ICD-10-CM

## 2019-11-11 DIAGNOSIS — E03.8 SUBCLINICAL HYPOTHYROIDISM: ICD-10-CM

## 2019-11-11 DIAGNOSIS — Z11.59 ENCOUNTER FOR HEPATITIS C SCREENING TEST FOR LOW RISK PATIENT: ICD-10-CM

## 2019-11-11 DIAGNOSIS — M16.11 PRIMARY OSTEOARTHRITIS OF RIGHT HIP: ICD-10-CM

## 2019-11-11 DIAGNOSIS — E87.6 HYPOKALEMIA: ICD-10-CM

## 2019-11-11 DIAGNOSIS — E55.9 VITAMIN D DEFICIENCY: ICD-10-CM

## 2019-11-11 DIAGNOSIS — Z00.00 ENCOUNTER FOR MEDICARE ANNUAL WELLNESS EXAM: Primary | ICD-10-CM

## 2019-11-11 DIAGNOSIS — Z78.0 MENOPAUSE: ICD-10-CM

## 2019-11-11 DIAGNOSIS — I10 ESSENTIAL HYPERTENSION WITH GOAL BLOOD PRESSURE LESS THAN 140/90: ICD-10-CM

## 2019-11-11 LAB
ANION GAP SERPL CALCULATED.3IONS-SCNC: 4 MMOL/L (ref 3–14)
BUN SERPL-MCNC: 18 MG/DL (ref 7–30)
CALCIUM SERPL-MCNC: 9.1 MG/DL (ref 8.5–10.1)
CHLORIDE SERPL-SCNC: 105 MMOL/L (ref 94–109)
CO2 SERPL-SCNC: 29 MMOL/L (ref 20–32)
CREAT SERPL-MCNC: 0.65 MG/DL (ref 0.52–1.04)
GFR SERPL CREATININE-BSD FRML MDRD: >90 ML/MIN/{1.73_M2}
GLUCOSE SERPL-MCNC: 93 MG/DL (ref 70–99)
POTASSIUM SERPL-SCNC: 3.4 MMOL/L (ref 3.4–5.3)
SODIUM SERPL-SCNC: 138 MMOL/L (ref 133–144)
TSH SERPL DL<=0.005 MIU/L-ACNC: 3.44 MU/L (ref 0.4–4)

## 2019-11-11 PROCEDURE — 36415 COLL VENOUS BLD VENIPUNCTURE: CPT | Performed by: FAMILY MEDICINE

## 2019-11-11 PROCEDURE — 86803 HEPATITIS C AB TEST: CPT | Performed by: FAMILY MEDICINE

## 2019-11-11 PROCEDURE — 82306 VITAMIN D 25 HYDROXY: CPT | Performed by: FAMILY MEDICINE

## 2019-11-11 PROCEDURE — 99214 OFFICE O/P EST MOD 30 MIN: CPT | Mod: 25 | Performed by: FAMILY MEDICINE

## 2019-11-11 PROCEDURE — 90670 PCV13 VACCINE IM: CPT | Performed by: FAMILY MEDICINE

## 2019-11-11 PROCEDURE — 90662 IIV NO PRSV INCREASED AG IM: CPT | Performed by: FAMILY MEDICINE

## 2019-11-11 PROCEDURE — 90472 IMMUNIZATION ADMIN EACH ADD: CPT | Performed by: FAMILY MEDICINE

## 2019-11-11 PROCEDURE — 84443 ASSAY THYROID STIM HORMONE: CPT | Performed by: FAMILY MEDICINE

## 2019-11-11 PROCEDURE — 99397 PER PM REEVAL EST PAT 65+ YR: CPT | Mod: 25 | Performed by: FAMILY MEDICINE

## 2019-11-11 PROCEDURE — 90471 IMMUNIZATION ADMIN: CPT | Performed by: FAMILY MEDICINE

## 2019-11-11 PROCEDURE — 80048 BASIC METABOLIC PNL TOTAL CA: CPT | Performed by: FAMILY MEDICINE

## 2019-11-11 RX ORDER — AMLODIPINE BESYLATE 5 MG/1
5 TABLET ORAL DAILY
Qty: 30 TABLET | Refills: 0 | Status: SHIPPED | OUTPATIENT
Start: 2019-11-11 | End: 2019-11-14 | Stop reason: ALTCHOICE

## 2019-11-11 RX ORDER — POTASSIUM CHLORIDE 1500 MG/1
20 TABLET, EXTENDED RELEASE ORAL DAILY
Qty: 90 TABLET | Refills: 1 | Status: SHIPPED | OUTPATIENT
Start: 2019-11-11 | End: 2020-06-12

## 2019-11-11 RX ORDER — HYDROCHLOROTHIAZIDE 25 MG/1
TABLET ORAL
Qty: 90 TABLET | Refills: 1 | Status: SHIPPED | OUTPATIENT
Start: 2019-11-11 | End: 2022-06-06

## 2019-11-11 ASSESSMENT — ENCOUNTER SYMPTOMS
PARESTHESIAS: 0
HEMATURIA: 0
BREAST MASS: 0
NAUSEA: 0
JOINT SWELLING: 0
MYALGIAS: 1
ARTHRALGIAS: 1
PALPITATIONS: 0
HEARTBURN: 0
FREQUENCY: 0
ABDOMINAL PAIN: 0
HEMATOCHEZIA: 0
CHILLS: 0
DIARRHEA: 0
FEVER: 0
HEADACHES: 0
COUGH: 0
DIZZINESS: 1
SORE THROAT: 0
CONSTIPATION: 0
WEAKNESS: 1
NERVOUS/ANXIOUS: 1
SHORTNESS OF BREATH: 1
DYSURIA: 0

## 2019-11-11 ASSESSMENT — MIFFLIN-ST. JEOR: SCORE: 1370.62

## 2019-11-11 ASSESSMENT — ACTIVITIES OF DAILY LIVING (ADL): CURRENT_FUNCTION: NO ASSISTANCE NEEDED

## 2019-11-11 NOTE — NURSING NOTE
"Initial BP (!) 152/90   Pulse 68   Temp 97.1  F (36.2  C) (Tympanic)   Resp 12   Ht 1.594 m (5' 2.75\")   Wt 86.5 kg (190 lb 12.8 oz)   Breastfeeding? No   BMI 34.07 kg/m   Estimated body mass index is 34.07 kg/m  as calculated from the following:    Height as of this encounter: 1.594 m (5' 2.75\").    Weight as of this encounter: 86.5 kg (190 lb 12.8 oz). .      "

## 2019-11-11 NOTE — LETTER
November 13, 2019      Dee Dee Shepherd  [address]  [facility] [address]        Dear ,    We are writing to inform you of your test results.     Your thyroid remains normal and electrolytes and kidney testing looked good.     Resulted Orders   Hepatitis C Screen Reflex to HCV RNA Quant and Genotype   Result Value Ref Range    Hepatitis C Antibody Nonreactive NR^Nonreactive      Comment:      Assay performance characteristics have not been established for newborns,   infants, and children     Basic metabolic panel   Result Value Ref Range    Sodium 138 133 - 144 mmol/L    Potassium 3.4 3.4 - 5.3 mmol/L    Chloride 105 94 - 109 mmol/L    Carbon Dioxide 29 20 - 32 mmol/L    Anion Gap 4 3 - 14 mmol/L    Glucose 93 70 - 99 mg/dL      Comment:      Non Fasting    Urea Nitrogen 18 7 - 30 mg/dL    Creatinine 0.65 0.52 - 1.04 mg/dL    GFR Estimate >90 >60 mL/min/[1.73_m2]      Comment:      Non  GFR Calc  Starting 12/18/2018, serum creatinine based estimated GFR (eGFR) will be   calculated using the Chronic Kidney Disease Epidemiology Collaboration   (CKD-EPI) equation.      GFR Estimate If Black >90 >60 mL/min/[1.73_m2]      Comment:       GFR Calc  Starting 12/18/2018, serum creatinine based estimated GFR (eGFR) will be   calculated using the Chronic Kidney Disease Epidemiology Collaboration   (CKD-EPI) equation.      Calcium 9.1 8.5 - 10.1 mg/dL   TSH with free T4 reflex   Result Value Ref Range    TSH 3.44 0.40 - 4.00 mU/L   Vitamin D Deficiency   Result Value Ref Range    Vitamin D Deficiency screening 41 20 - 75 ug/L      Comment:      Season, race, dietary intake, and treatment affect the concentration of   25-hydroxy-Vitamin D. Values may decrease during winter months and increase   during summer months. Values 20-29 ug/L may indicate Vitamin D insufficiency   and values <20 ug/L may indicate Vitamin D deficiency.  Vitamin D determination is routinely performed  by an immunoassay specific for   25 hydroxyvitamin D3.  If an individual is on vitamin D2 (ergocalciferol)   supplementation, please specify 25 OH vitamin D2 and D3 level determination by   LCMSMS test [id number].         If you have any questions or concerns, please call the clinic at the number listed above.       Sincerely,        Dalia Wu, DO

## 2019-11-11 NOTE — PROGRESS NOTES
"SUBJECTIVE:   Dee Dee Shepherd is a 64 year old female who presents for Preventive Visit.    Are you in the first 12 months of your Medicare coverage?  No    Healthy Habits:     In general, how would you rate your overall health?  Good    Frequency of exercise:  None    Do you usually eat at least 4 servings of fruit and vegetables a day, include whole grains    & fiber and avoid regularly eating high fat or \"junk\" foods?  No    Taking medications regularly:  Yes    Medication side effects:  None    Ability to successfully perform activities of daily living:  No assistance needed    Home Safety:  Lack of grab bars in the bathroom    Hearing Impairment:  No hearing concerns    In the past 6 months, have you been bothered by leaking of urine? Yes    In general, how would you rate your overall mental or emotional health?  Fair      PHQ-2 Total Score: 3    Additional concerns today:  No      *  R hip - pain in the R and R thigh when getting in and out of the car.  When she walks a lot feels like her low back \"tightens up\" and she needs a cart.  Hip is an issue here too.  Does a lot of stairs at home and work. Has to take the stairs 1 at a time (cannot alternate legs) due to hip pain.  Did have a fall after standing up from a seated position and having her leg give out on her.  Thinks this was hip related as well.   Had steroid injection in the hip which helped dull the pain but did not eliminate it and it did not last long.    *  Feels she gets fatigued easily but not sure what that is from.  Wants to be more active but is limited by pain and feels like she is limited by that.  Knows she snores and has been told by friends that she does stop breathing at times at night.  Does feel fatigued and sleepy during the day.  Has been evaluated in the distant past for sleep apnea.      * feels like sertraline is working well.  Does still have some \"down times\" but feels overall stable.  She is not interested in considering " change of medication at this time.  She has been on this dose of sertraline for many years and is concerned that symptoms may worsen with the switch.  She also declines referral to therapy at this time.    Do you feel safe in your environment? Yes    Have you ever done Advance Care Planning? (For example, a Health Directive, POLST, or a discussion with a medical provider or your loved ones about your wishes): No, advance care planning information given to patient to review.  Patient plans to discuss their wishes with loved ones or provider.        Fall risk  Fallen 2 or more times in the past year?: No  Any fall with injury in the past year?: No    Cognitive Screening   1) Repeat 3 items (Leader, Season, Table)    2) Clock draw: NORMAL  3) 3 item recall: Recalls 3 objects  Results: 3 items recalled: COGNITIVE IMPAIRMENT LESS LIKELY    Mini-CogTM Copyright S [employer]. Licensed by the author for use in [facility]; reprinted with permission (mercedes@[facility]). All rights reserved.      Do you have sleep apnea, excessive snoring or daytime drowsiness?: yes    Reviewed and updated as needed this visit by clinical staff  Tobacco  Allergies  Meds  Med Hx  Surg Hx  Fam Hx  Soc Hx        Reviewed and updated as needed this visit by Provider  Tobacco  Allergies  Meds  Med Hx  Surg Hx  Fam Hx  Soc Hx       Social History     Tobacco Use     Smoking status: Current Every Day Smoker     Packs/day: 0.50     Years: 35.00     Pack years: 17.50     Types: Cigarettes     Smokeless tobacco: Never Used   Substance Use Topics     Alcohol use: Yes     If you drink alcohol do you typically have >3 drinks per day or >7 drinks per week? No    Alcohol Use 11/11/2019   Prescreen: >3 drinks/day or >7 drinks/week? -   Prescreen: >3 drinks/day or >7 drinks/week? No       Current providers sharing in care for this patient include:   Patient Care Team:  Dalia Wu DO as PCP - General (Family Practice)  Dalia Wu  DO as Assigned PCP  Robin Gann as Health     The following health maintenance items are reviewed in Epic and correct as of today:  Health Maintenance   Topic Date Due     DEXA  1955     HEPATITIS C SCREENING  1955     ADVANCE CARE PLANNING  1955     FALL RISK ASSESSMENT  09/20/2018     PNEUMOCOCCAL IMMUNIZATION 65+ LOW/MEDIUM RISK (1 of 2 - PCV13) 09/20/2018     MEDICARE ANNUAL WELLNESS VISIT  05/17/2019     INFLUENZA VACCINE (1) 09/01/2019     ZOSTER IMMUNIZATION (2 of 2) 01/10/2019     DTAP/TDAP/TD IMMUNIZATION (2 - Td) 04/22/2020     PHQ-9  05/11/2020     LUNG CANCER SCREENING ANNUAL  06/13/2020     MAMMO SCREENING  06/13/2021     COLONOSCOPY  12/18/2023     LIPID  05/17/2024     DEPRESSION ACTION PLAN  Completed     IPV IMMUNIZATION  Aged Out     MENINGITIS IMMUNIZATION  Aged Out       Review of Systems   Constitutional: Negative for chills and fever.   HENT: Negative for congestion, hearing loss and sore throat.    Eyes: Positive for visual disturbance.   Respiratory: Positive for shortness of breath. Negative for cough.    Cardiovascular: Negative for chest pain, palpitations and peripheral edema.   Gastrointestinal: Negative for abdominal pain, constipation, diarrhea, heartburn, hematochezia and nausea.   Breasts:  Negative for tenderness, breast mass and discharge.   Genitourinary: Negative for dysuria, frequency, genital sores, hematuria, pelvic pain, urgency, vaginal bleeding and vaginal discharge.   Musculoskeletal: Positive for arthralgias and myalgias. Negative for joint swelling.   Skin: Negative for rash.   Neurological: Positive for dizziness and weakness. Negative for headaches and paresthesias.   Psychiatric/Behavioral: Positive for mood changes. The patient is nervous/anxious.      EYE:  Just saw her eye doctor last week and was told she had good vision. Needs cheaters.  Respiratory:  Has known sleep apnea by report of family.  Has never been evaluated.      Neuro:   "Weakness related to leg pain and feels like she does not have good support.  Denies dizziness.      OBJECTIVE:   BP (!) 152/90   Pulse 68   Temp 97.1  F (36.2  C) (Tympanic)   Resp 12   Ht 1.594 m (5' 2.75\")   Wt 86.5 kg (190 lb 12.8 oz)   Breastfeeding? No   BMI 34.07 kg/m   Estimated body mass index is 34.07 kg/m  as calculated from the following:    Height as of this encounter: 1.594 m (5' 2.75\").    Weight as of this encounter: 86.5 kg (190 lb 12.8 oz).  Physical Exam  GENERAL APPEARANCE: healthy, alert and no distress  EYES: Eyes grossly normal to inspection, PERRL and conjunctivae and sclerae normal  HENT: ear canals and TM's normal, nose and mouth without ulcers or lesions, oropharynx clear and oral mucous membranes moist  NECK: no adenopathy, no asymmetry, masses, or scars and thyroid normal to palpation  RESP: lungs clear to auscultation - no rales, rhonchi or wheezes  BREAST: declined by pt  CV: regular rate and rhythm, normal S1 S2, no S3 or S4, no murmur, click or rub, no peripheral edema and peripheral pulses strong  ABDOMEN: soft, nontender, no hepatosplenomegaly, no masses and bowel sounds normal  MS: no musculoskeletal defects are noted and gait is age appropriate without ataxia  NEURO: Normal strength and tone, sensory exam grossly normal, mentation intact and speech normal  PSYCH: mentation appears normal and affect normal/bright    Diagnostic Test Results:  Labs reviewed in Epic    ASSESSMENT / PLAN:       ICD-10-CM    1. Encounter for Medicare annual wellness exam Z00.00    2. Essential hypertension with goal blood pressure less than 140/90 I10 hydrochlorothiazide (HYDRODIURIL) 25 MG tablet     Basic metabolic panel     lisinopril (PRINIVIL/ZESTRIL) 10 MG tablet     **Basic metabolic panel FUTURE 14d     DISCONTINUED: amLODIPine (NORVASC) 5 MG tablet   3. Hypokalemia E87.6 potassium chloride ER (KLOR-CON) 20 MEQ CR tablet     Basic metabolic panel   4. Primary osteoarthritis of right hip " "M16.11 ORTHO  REFERRAL   5. Menopause Z78.0 DEXA HIP/PELVIS/SPINE - Future   6. Sleep apnea, unspecified type G47.30 SLEEP EVALUATION & MANAGEMENT REFERRAL - ADULT -[facility]  [phone] (Age 2 and up)   7. Subclinical hypothyroidism E03.9 TSH with free T4 reflex   8. Vitamin D deficiency E55.9 Vitamin D Deficiency   9. Encounter for hepatitis C screening test for low risk patient Z11.59 Hepatitis C Screen Reflex to HCV RNA Quant and Genotype     For the hip:  I would recommend visiting with orthopedics to discuss options.  You should be getting a call in 1-2 days to help you schedule.  I would recommend seeing a physician with [facility] out of the [facility]    For the blood pressure:  We'll add a low dose medicine, amlodipine, to the hydrochlorothiazide.  This is just once daily.  Please come back in the next 2-3 weeks for a recheck of your blood pressure    For the possible sleep apnea:  I do think a visit with the sleep docotor is needed.  Please call to schedule    You are due for some blood work which we will do today as well as a screening bone density exam.  You can call [phone] to get that scheduled    You are also due for the second dose of the shingles shot.  You should be cary to do that through our pharmacy.    COUNSELING:  Reviewed preventive health counseling, as reflected in patient instructions  Special attention given to:       Regular exercise       Healthy diet/nutrition       Fall risk prevention       Osteoporosis Prevention/Bone Health       Colon cancer screening       Hepatitis C screening       Advanced Planning     Estimated body mass index is 34.07 kg/m  as calculated from the following:    Height as of this encounter: 1.594 m (5' 2.75\").    Weight as of this encounter: 86.5 kg (190 lb 12.8 oz).    Weight management plan: Discussed healthy diet and exercise guidelines     reports that she has been smoking cigarettes. She has " a 17.50 pack-year smoking history. She has never used smokeless tobacco.  Tobacco Cessation Action Plan: Information offered: Patient not interested at this time    Appropriate preventive services were discussed with this patient, including applicable screening as appropriate for cardiovascular disease, diabetes, osteopenia/osteoporosis, and glaucoma.  As appropriate for age/gender, discussed screening for colorectal cancer, prostate cancer, breast cancer, and cervical cancer. Checklist reviewing preventive services available has been given to the patient.    Reviewed patients plan of care and provided an AVS. The Intermediate Care Plan ( asthma action plan, low back pain action plan, and migraine action plan) for Dee Dee meets the Care Plan requirement. This Care Plan has been established and reviewed with the Patient.    Counseling Resources:  ATP IV Guidelines  Pooled Cohorts Equation Calculator  Breast Cancer Risk Calculator  FRAX Risk Assessment  ICSI Preventive Guidelines  Dietary Guidelines for Americans, 2010  Metaps's MyPlate  ASA Prophylaxis  Lung CA Screening    Dalia Wu, DO  [facility]    Patient Instructions   For the hip:  I would recommend visiting with orthopedics to discuss options.  You should be getting a call in 1-2 days to help you schedule.  I would recommend seeing a physician with [facility] out of the [facility]    For the blood pressure:  We'll add a low dose medicine, amlodipine, to the hydrochlorothiazide.  This is just once daily.  Please come back in the next 2-3 weeks for a recheck of your blood pressure    For the possible sleep apnea:  I do think a visit with the sleep docotor is needed.  Please call to schedule    You are due for some blood work which we will do today as well as a screening bone density exam.  You can call [phone] to get that scheduled    You are also due for the second dose of the shingles shot.  You should be cary  to do that through our pharmacy.      Preventive Health Recommendations    See your health care provider every year to    Review health changes.     Discuss preventive care.      Review your medicines if your doctor has prescribed any.    You no longer need a yearly Pap test unless you've had an abnormal Pap test in the past 10 years. If you have vaginal symptoms, such as bleeding or discharge, be sure to talk with your provider about a Pap test.    Every 1 to 2 years, have a mammogram.  If you are over 69, talk with your health care provider about whether or not you want to continue having screening mammograms.    Every 10 years, have a colonoscopy. Or, have a yearly FIT test (stool test). These exams will check for colon cancer.     Have a cholesterol test every 5 years, or more often if your doctor advises it.     Have a diabetes test (fasting glucose) every three years. If you are at risk for diabetes, you should have this test more often.     At age 65, have a bone density scan (DEXA) to check for osteoporosis (brittle bone disease).    Shots:    Get a flu shot each year.    Get a tetanus shot every 10 years.    Talk to your doctor about your pneumonia vaccines. There are now two you should receive - Pneumovax (PPSV 23) and Prevnar (PCV 13).    Talk to your pharmacist about the shingles vaccine.    Talk to your doctor about the hepatitis B vaccine.    Nutrition:     Eat at least 5 servings of fruits and vegetables each day.    Eat whole-grain bread, whole-wheat pasta and brown rice instead of white grains and rice.    Get adequate Calcium and Vitamin D.     Lifestyle    Exercise at least 150 minutes a week (30 minutes a day, 5 days a week). This will help you control your weight and prevent disease.    Limit alcohol to one drink per day.    No smoking.     Wear sunscreen to prevent skin cancer.     See your dentist twice a year for an exam and cleaning.    See your eye doctor every 1 to 2 years to screen for  conditions such as glaucoma, macular degeneration and cataracts.

## 2019-11-11 NOTE — PATIENT INSTRUCTIONS
For the hip:  I would recommend visiting with orthopedics to discuss options.  You should be getting a call in 1-2 days to help you schedule.  I would recommend seeing a physician with [facility] out of the [facility]    For the blood pressure:  We'll add a low dose medicine, amlodipine, to the hydrochlorothiazide.  This is just once daily.  Please come back in the next 2-3 weeks for a recheck of your blood pressure    For the possible sleep apnea:  I do think a visit with the sleep docotor is needed.  Please call to schedule    You are due for some blood work which we will do today as well as a screening bone density exam.  You can call [phone] to get that scheduled    You are also due for the second dose of the shingles shot.  You should be cary to do that through our pharmacy.      Preventive Health Recommendations    See your health care provider every year to    Review health changes.     Discuss preventive care.      Review your medicines if your doctor has prescribed any.    You no longer need a yearly Pap test unless you've had an abnormal Pap test in the past 10 years. If you have vaginal symptoms, such as bleeding or discharge, be sure to talk with your provider about a Pap test.    Every 1 to 2 years, have a mammogram.  If you are over 69, talk with your health care provider about whether or not you want to continue having screening mammograms.    Every 10 years, have a colonoscopy. Or, have a yearly FIT test (stool test). These exams will check for colon cancer.     Have a cholesterol test every 5 years, or more often if your doctor advises it.     Have a diabetes test (fasting glucose) every three years. If you are at risk for diabetes, you should have this test more often.     At age 65, have a bone density scan (DEXA) to check for osteoporosis (brittle bone disease).    Shots:    Get a flu shot each year.    Get a tetanus shot every 10 years.    Talk to your doctor about  your pneumonia vaccines. There are now two you should receive - Pneumovax (PPSV 23) and Prevnar (PCV 13).    Talk to your pharmacist about the shingles vaccine.    Talk to your doctor about the hepatitis B vaccine.    Nutrition:     Eat at least 5 servings of fruits and vegetables each day.    Eat whole-grain bread, whole-wheat pasta and brown rice instead of white grains and rice.    Get adequate Calcium and Vitamin D.     Lifestyle    Exercise at least 150 minutes a week (30 minutes a day, 5 days a week). This will help you control your weight and prevent disease.    Limit alcohol to one drink per day.    No smoking.     Wear sunscreen to prevent skin cancer.     See your dentist twice a year for an exam and cleaning.    See your eye doctor every 1 to 2 years to screen for conditions such as glaucoma, macular degeneration and cataracts.

## 2019-11-11 NOTE — LETTER
November 13, 2019      Dee Dee SANKET Cora  [address]  [facility] [address]        Dear ,    We are writing to inform you of your test results.    Your vitamin D level is much better.  I would recommend taking no more then 1000 units daily of vitamin D at this point.    Your hepatitis C screening was negative/normal.      Resulted Orders   Hepatitis C Screen Reflex to HCV RNA Quant and Genotype   Result Value Ref Range    Hepatitis C Antibody Nonreactive NR^Nonreactive      Comment:      Assay performance characteristics have not been established for newborns,   infants, and children     Basic metabolic panel   Result Value Ref Range    Sodium 138 133 - 144 mmol/L    Potassium 3.4 3.4 - 5.3 mmol/L    Chloride 105 94 - 109 mmol/L    Carbon Dioxide 29 20 - 32 mmol/L    Anion Gap 4 3 - 14 mmol/L    Glucose 93 70 - 99 mg/dL      Comment:      Non Fasting    Urea Nitrogen 18 7 - 30 mg/dL    Creatinine 0.65 0.52 - 1.04 mg/dL    GFR Estimate >90 >60 mL/min/[1.73_m2]      Comment:      Non  GFR Calc  Starting 12/18/2018, serum creatinine based estimated GFR (eGFR) will be   calculated using the Chronic Kidney Disease Epidemiology Collaboration   (CKD-EPI) equation.      GFR Estimate If Black >90 >60 mL/min/[1.73_m2]      Comment:       GFR Calc  Starting 12/18/2018, serum creatinine based estimated GFR (eGFR) will be   calculated using the Chronic Kidney Disease Epidemiology Collaboration   (CKD-EPI) equation.      Calcium 9.1 8.5 - 10.1 mg/dL   TSH with free T4 reflex   Result Value Ref Range    TSH 3.44 0.40 - 4.00 mU/L   Vitamin D Deficiency   Result Value Ref Range    Vitamin D Deficiency screening 41 20 - 75 ug/L      Comment:      Season, race, dietary intake, and treatment affect the concentration of   25-hydroxy-Vitamin D. Values may decrease during winter months and increase   during summer months. Values 20-29 ug/L may indicate Vitamin D insufficiency   and  values <20 ug/L may indicate Vitamin D deficiency.  Vitamin D determination is routinely performed by an immunoassay specific for   25 hydroxyvitamin D3.  If an individual is on vitamin D2 (ergocalciferol)   supplementation, please specify 25 OH vitamin D2 and D3 level determination by   LCMSMS test [id number].         If you have any questions or concerns, please call the clinic at the number listed above.       Sincerely,        Dalia Wu, DO

## 2019-11-12 LAB
DEPRECATED CALCIDIOL+CALCIFEROL SERPL-MC: 41 UG/L (ref 20–75)
HCV AB SERPL QL IA: NONREACTIVE

## 2019-11-14 ENCOUNTER — TRANSFERRED RECORDS (OUTPATIENT)
Dept: HEALTH INFORMATION MANAGEMENT | Facility: CLINIC | Age: 64
End: 2019-11-14

## 2019-11-14 RX ORDER — LISINOPRIL 10 MG/1
10 TABLET ORAL DAILY
Qty: 30 TABLET | Refills: 0 | Status: SHIPPED | OUTPATIENT
Start: 2019-11-14 | End: 2019-12-05

## 2019-11-18 ENCOUNTER — HOSPITAL ENCOUNTER (OUTPATIENT)
Dept: BONE DENSITY | Facility: CLINIC | Age: 64
Discharge: HOME | End: 2019-11-18
Attending: FAMILY MEDICINE | Admitting: FAMILY MEDICINE
Payer: COMMERCIAL

## 2019-11-18 DIAGNOSIS — Z78.0 MENOPAUSE: ICD-10-CM

## 2019-11-18 PROCEDURE — 77080 DXA BONE DENSITY AXIAL: CPT

## 2019-11-26 ENCOUNTER — ALLIED HEALTH/NURSE VISIT (OUTPATIENT)
Dept: FAMILY MEDICINE | Facility: CLINIC | Age: 64
End: 2019-11-26
Payer: COMMERCIAL

## 2019-11-26 VITALS — SYSTOLIC BLOOD PRESSURE: 110 MMHG | HEART RATE: 76 BPM | DIASTOLIC BLOOD PRESSURE: 62 MMHG

## 2019-11-26 DIAGNOSIS — Z01.30 BP CHECK: Primary | ICD-10-CM

## 2019-11-26 PROCEDURE — 99207 ZZC NO CHARGE NURSE ONLY: CPT

## 2019-11-26 NOTE — PROGRESS NOTES
SUBJECTIVE:  Dee Dee Shepherd is a 64 year old female who presents for a follow up evaluation of her hypertension.    The reason for the visit is:  a recent medication change    Patient is taking medication as prescribed.  Patient is tolerating medications well.  Patient is not monitoring Blood Pressure at home.  Average readings if yes are NA    Current complaints: none      Current Outpatient Medications   Medication     Diphenhydramine-APAP, sleep,  MG TABS     hydrochlorothiazide (HYDRODIURIL) 25 MG tablet     lisinopril (PRINIVIL/ZESTRIL) 10 MG tablet     potassium chloride ER (KLOR-CON) 20 MEQ CR tablet     sertraline (ZOLOFT) 100 MG tablet     simvastatin (ZOCOR) 40 MG tablet     Current Facility-Administered Medications   Medication     ropivacaine (NAROPIN) injection 3 mL     triamcinolone (KENALOG-40) injection 40 mg       No Known Allergies      OBJECTIVE:  Please get a blood pressure AND a pulse.  A height is also needed if has not been done in the past year.    /62 (BP Location: Left arm, Patient Position: Sitting, Cuff Size: Adult Regular)   Pulse 76     Vitals as recorded, a regular cuff was used.    ASSESSMENT:    Is the HYPERTENSION goal on the problem list? Yes  Patient Active Problem List   Diagnosis     Essential hypertension with goal blood pressure less than 140/90     Hyperlipidemia LDL goal <130     Abnormal glucose     Personal history of malignant neoplasm of breast     Severe episode of recurrent major depressive disorder, without psychotic features (H)     Primary osteoarthritis of right hip       Plan:  The patient's blood pressure is less than documented goal. The patient will be discharged home. CC: this note to the patient's primary provider.     The patient s blood pressure is higher than goal but is less than 180 systolically AND less that 110 diastolically. The patient will be discharged home.  A telephone encounter will be created with this note and sent to the  patient's primary provider for action.     The patient's blood pressure is above 180 systolically OR is above 110 diastolically. The primary provider, RN, or an available provider will be consulted for immediate action. Keep the patient here for appropriate urgent action.

## 2019-12-02 ENCOUNTER — TELEPHONE (OUTPATIENT)
Dept: FAMILY MEDICINE | Facility: CLINIC | Age: 64
End: 2019-12-02

## 2019-12-02 DIAGNOSIS — R93.7 ABNORMAL BONE DENSITY SCREENING: Primary | ICD-10-CM

## 2019-12-05 DIAGNOSIS — I10 ESSENTIAL HYPERTENSION WITH GOAL BLOOD PRESSURE LESS THAN 140/90: ICD-10-CM

## 2019-12-05 DIAGNOSIS — R93.7 ABNORMAL BONE DENSITY SCREENING: ICD-10-CM

## 2019-12-05 LAB
ANION GAP SERPL CALCULATED.3IONS-SCNC: 6 MMOL/L (ref 3–14)
BUN SERPL-MCNC: 19 MG/DL (ref 7–30)
CALCIUM SERPL-MCNC: 9 MG/DL (ref 8.5–10.1)
CHLORIDE SERPL-SCNC: 112 MMOL/L (ref 94–109)
CO2 SERPL-SCNC: 24 MMOL/L (ref 20–32)
CREAT SERPL-MCNC: 0.7 MG/DL (ref 0.52–1.04)
GFR SERPL CREATININE-BSD FRML MDRD: 90 ML/MIN/{1.73_M2}
GLUCOSE SERPL-MCNC: 94 MG/DL (ref 70–99)
PHOSPHATE SERPL-MCNC: 3.2 MG/DL (ref 2.5–4.5)
POTASSIUM SERPL-SCNC: 3.6 MMOL/L (ref 3.4–5.3)
PTH-INTACT SERPL-MCNC: 59 PG/ML (ref 18–80)
SODIUM SERPL-SCNC: 142 MMOL/L (ref 133–144)

## 2019-12-05 PROCEDURE — 36415 COLL VENOUS BLD VENIPUNCTURE: CPT | Performed by: FAMILY MEDICINE

## 2019-12-05 PROCEDURE — 80048 BASIC METABOLIC PNL TOTAL CA: CPT | Performed by: FAMILY MEDICINE

## 2019-12-05 PROCEDURE — 83970 ASSAY OF PARATHORMONE: CPT | Performed by: FAMILY MEDICINE

## 2019-12-05 PROCEDURE — 84100 ASSAY OF PHOSPHORUS: CPT | Performed by: FAMILY MEDICINE

## 2019-12-05 NOTE — TELEPHONE ENCOUNTER
"Dr. Wu,  Routing refill request to provider for review/approval because:  Patient was told to return for B/P on 11-11-19 and patient did, see allied health nurse visit on 11-26-19.  Labs not in normal range: Cr, K+.    Please advise the refill.      Requested Prescriptions   Pending Prescriptions Disp Refills     lisinopril (PRINIVIL/ZESTRIL) 10 MG tablet [Pharmacy Med Name: LISINOPRIL 10MG TABS] 30 tablet 0     Sig: TAKE 1 TABLET BY MOUTH DAILY.       ACE Inhibitors (Including Combos) Protocol Passed - 12/5/2019  5:03 AM        Passed - Blood pressure under 140/90 in past 12 months     BP Readings from Last 3 Encounters:   11/26/19 110/62   11/11/19 (!) 152/90   06/26/19 128/76                 Passed - Recent (12 mo) or future (30 days) visit within the authorizing provider's specialty     Patient has had an office visit with the authorizing provider or a provider within the authorizing providers department within the previous 12 mos or has a future within next 30 days. See \"Patient Info\" tab in inbasket, or \"Choose Columns\" in Meds & Orders section of the refill encounter.              Passed - Medication is active on med list        Passed - Patient is age 18 or older        Passed - No active pregnancy on record        Passed - Normal serum creatinine on file in past 12 months     Recent Labs   Lab Test 11/11/19  1520   CR 0.65             Passed - Normal serum potassium on file in past 12 months     Recent Labs   Lab Test 11/11/19  1520   POTASSIUM 3.4             Passed - No positive pregnancy test within past 12 months        NIKA Betancur          "

## 2019-12-06 RX ORDER — LISINOPRIL 10 MG/1
TABLET ORAL
Qty: 90 TABLET | Refills: 3 | Status: SHIPPED | OUTPATIENT
Start: 2019-12-06 | End: 2021-01-04

## 2019-12-09 ENCOUNTER — TELEPHONE (OUTPATIENT)
Dept: FAMILY MEDICINE | Facility: CLINIC | Age: 64
End: 2019-12-09

## 2019-12-09 DIAGNOSIS — R93.7 ABNORMAL BONE DENSITY SCREENING: Primary | ICD-10-CM

## 2019-12-09 NOTE — TELEPHONE ENCOUNTER
Pt is calling and asking to talk to Dr aviles regarding her dexa scan and her hip and lab work . Please call to advise.     Pepper Springer, Station

## 2019-12-10 NOTE — TELEPHONE ENCOUNTER
Please call Dee Dee and let her know her blood work was all completely normal.  I am not sure what to make of the elevated bone density noted on her scan.    I would recommend we have her visit with endocrinology to discuss.  Referral placed.    Dalia Wu, DO

## 2019-12-10 NOTE — TELEPHONE ENCOUNTER
Spoke with patient, she was informed of Dr. Omalley note.  Number given [phone] for Endocrinology-[facility]  Patient verbalized understanding    Janice Ashraf RN

## 2019-12-12 ENCOUNTER — OFFICE VISIT (OUTPATIENT)
Dept: ENDOCRINOLOGY | Facility: CLINIC | Age: 64
End: 2019-12-12
Payer: COMMERCIAL

## 2019-12-12 VITALS
DIASTOLIC BLOOD PRESSURE: 83 MMHG | HEART RATE: 78 BPM | SYSTOLIC BLOOD PRESSURE: 144 MMHG | OXYGEN SATURATION: 98 % | BODY MASS INDEX: 33.89 KG/M2 | WEIGHT: 189.8 LBS

## 2019-12-12 DIAGNOSIS — R93.7 ABNORMAL BONE DENSITY SCREENING: Primary | ICD-10-CM

## 2019-12-12 LAB
ALBUMIN SERPL-MCNC: 4.1 G/DL (ref 3.4–5)
ALP SERPL-CCNC: 113 U/L (ref 40–150)
ALT SERPL W P-5'-P-CCNC: 22 U/L (ref 0–50)
AST SERPL W P-5'-P-CCNC: 14 U/L (ref 0–45)
BILIRUB DIRECT SERPL-MCNC: 0.1 MG/DL (ref 0–0.2)
BILIRUB SERPL-MCNC: 0.4 MG/DL (ref 0.2–1.3)
PROT SERPL-MCNC: 7.3 G/DL (ref 6.8–8.8)

## 2019-12-12 PROCEDURE — 36415 COLL VENOUS BLD VENIPUNCTURE: CPT | Performed by: INTERNAL MEDICINE

## 2019-12-12 PROCEDURE — 84305 ASSAY OF SOMATOMEDIN: CPT | Performed by: INTERNAL MEDICINE

## 2019-12-12 PROCEDURE — 99000 SPECIMEN HANDLING OFFICE-LAB: CPT | Performed by: INTERNAL MEDICINE

## 2019-12-12 PROCEDURE — 84080 ASSAY ALKALINE PHOSPHATASES: CPT | Mod: 90 | Performed by: INTERNAL MEDICINE

## 2019-12-12 PROCEDURE — 80076 HEPATIC FUNCTION PANEL: CPT | Performed by: INTERNAL MEDICINE

## 2019-12-12 PROCEDURE — 99204 OFFICE O/P NEW MOD 45 MIN: CPT | Performed by: INTERNAL MEDICINE

## 2019-12-12 NOTE — PROGRESS NOTES
Dee Dee to follow up with Primary Care provider regarding elevated blood pressure.    Genna Patel CMA  12/12/2019  2:51 PM

## 2019-12-12 NOTE — PROGRESS NOTES
"CC: Abnormal bone density.     HPI: Patient here for evaluation of abnormal bone density.   Found on routine screening.     No prior fractures or kidney stones.     Notes her show size has increased.   No dental issues. 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   No noted changed in facial features.     No recent change in weight.     She had breast cancer in 2009.   Treated with surgery, chemo, and radiation.     Recently ran out of her vitamin D, 4000 international unit(s) daily.   No supplemental calcium or strontium.   ~3 servings of dairy a day.     ROS: 10 point ROS neg other than the symptoms noted above in the HPI.    PMH:   Patient Active Problem List   Diagnosis     Essential hypertension with goal blood pressure less than 140/90     Hyperlipidemia LDL goal <130     Abnormal glucose     Personal history of malignant neoplasm of breast     Severe episode of recurrent major depressive disorder, without psychotic features (H)     Primary osteoarthritis of right hip     Meds:  Current Outpatient Medications   Medication     Diphenhydramine-APAP, sleep,  MG TABS     hydrochlorothiazide (HYDRODIURIL) 25 MG tablet     lisinopril (PRINIVIL/ZESTRIL) 10 MG tablet     potassium chloride ER (KLOR-CON) 20 MEQ CR tablet     sertraline (ZOLOFT) 100 MG tablet     simvastatin (ZOCOR) 40 MG tablet     Current Facility-Administered Medications   Medication     ropivacaine (NAROPIN) injection 3 mL     triamcinolone (KENALOG-40) injection 40 mg     FHX:   Aunt had a Dowager's hump.     SHX:  1/2 PPD.     Exam:   Vital signs:      BP: (!) 144/83 Pulse: 78     SpO2: 98 %       Weight: 86.1 kg (189 lb 12.8 oz)  Estimated body mass index is 33.89 kg/m  as calculated from the following:    Height as of 11/11/19: 1.594 m (5' 2.75\").    Weight as of this encounter: 86.1 kg (189 lb 12.8 oz).  Gen: In NAD.   HEENT: no proptosis or lid lag, EOMI, no palpable thyroid tissue.  Card: S1 S2 RRR no m/r/g. no LE edema.   Pulm: CTA b/l.   GI: NT ND " +BS.   MSK: no gross deformities.   Derm: no rashes or lesions. No sausage digits or frontal bossing.   Neuro: no tremor, +2 DTR's.     A/P:   Abnormal bone density - Abnormal high BMD. I have reviewed the images. I asked her about strontium use which she denies. Sclerostin deficiency is a possibility as is osteopetrosis. Possible Paget's. IGF-1 not on file. Normal vitamin D, Ca, Cr, PTH. Her vitamin D was actually 11 in 5/2019 before starting cholecalciferol.   -Lab today for IGF-1, Liver panel, BSAP.  -I will discuss your case with my colleagues at [facility]. (Genetic testing +/- bone biopsy).    Forrest Burger MD on 12/12/2019 at 4:05 PM

## 2019-12-12 NOTE — LETTER
12/12/2019         RE: Dee Dee Shepherd  [address]  [place] [address]        Dear Colleague,    Thank you for referring your patient, Dee Dee Shepherd, to the [facility]. Please see a copy of my visit note below.    CC: Abnormal bone density.     HPI: Patient here for evaluation of abnormal bone density.   Found on routine screening.     No prior fractures or kidney stones.     Notes her show size has increased.   No dental issues. 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   No noted changed in facial features.     No recent change in weight.     She had breast cancer in 2009.   Treated with surgery, chemo, and radiation.     Recently ran out of her vitamin D, 4000 international unit(s) daily.   No supplemental calcium or strontium.   ~3 servings of dairy a day.     ROS: 10 point ROS neg other than the symptoms noted above in the HPI.    PMH:   Patient Active Problem List   Diagnosis     Essential hypertension with goal blood pressure less than 140/90     Hyperlipidemia LDL goal <130     Abnormal glucose     Personal history of malignant neoplasm of breast     Severe episode of recurrent major depressive disorder, without psychotic features (H)     Primary osteoarthritis of right hip     Meds:  Current Outpatient Medications   Medication     Diphenhydramine-APAP, sleep,  MG TABS     hydrochlorothiazide (HYDRODIURIL) 25 MG tablet     lisinopril (PRINIVIL/ZESTRIL) 10 MG tablet     potassium chloride ER (KLOR-CON) 20 MEQ CR tablet     sertraline (ZOLOFT) 100 MG tablet     simvastatin (ZOCOR) 40 MG tablet     Current Facility-Administered Medications   Medication     ropivacaine (NAROPIN) injection 3 mL     triamcinolone (KENALOG-40) injection 40 mg     FHX:   Aunt had a Dowager's hump.     SHX:  1/2 PPD.     Exam:   Vital signs:      BP: (!) 144/83 Pulse: 78     SpO2: 98 %       Weight: 86.1 kg (189 lb 12.8 oz)  Estimated body mass index is 33.89 kg/m  as calculated from the following:     "Height as of 11/11/19: 1.594 m (5' 2.75\").    Weight as of this encounter: 86.1 kg (189 lb 12.8 oz).  Gen: In NAD.   HEENT: no proptosis or lid lag, EOMI, no palpable thyroid tissue.  Card: S1 S2 RRR no m/r/g. no LE edema.   Pulm: CTA b/l.   GI: NT ND +BS.   MSK: no gross deformities.   Derm: no rashes or lesions. No sausage digits or frontal bossing.   Neuro: no tremor, +2 DTR's.     A/P:   Abnormal bone density - Abnormal high BMD. I have reviewed the images. I asked her about strontium use which she denies. Sclerostin deficiency is a possibility as is osteopetrosis. Possible Paget's. IGF-1 not on file. Normal vitamin D, Ca, Cr, PTH. Her vitamin D was actually 11 in 5/2019 before starting cholecalciferol.   -Lab today for IGF-1, Liver panel, BSAP.  -I will discuss your case with my colleagues at [facility]. (Genetic testing +/- bone biopsy).    Forrest Burger MD on 12/12/2019 at 4:05 PM            Dee Dee to follow up with Primary Care provider regarding elevated blood pressure.    Genna Patel CMA  12/12/2019  2:51 PM        Again, thank you for allowing me to participate in the care of your patient.        Sincerely,        Forrest Burger MD    "

## 2019-12-13 LAB — IGF-I BLD-MCNC: 138 NG/ML (ref 32–238)

## 2019-12-14 LAB — ALP BONE SERPL-MCNC: 14.8 UG/L

## 2019-12-16 ENCOUNTER — HOSPITAL ENCOUNTER (OUTPATIENT)
Facility: CLINIC | Age: 64
Setting detail: SPECIMEN
Discharge: HOME | End: 2019-12-16
Admitting: INTERNAL MEDICINE
Payer: COMMERCIAL

## 2019-12-16 DIAGNOSIS — R93.7 ABNORMAL BONE DENSITY SCREENING: Primary | ICD-10-CM

## 2019-12-16 PROCEDURE — 83520 IMMUNOASSAY QUANT NOS NONAB: CPT | Performed by: INTERNAL MEDICINE

## 2019-12-16 PROCEDURE — 00000402 ZZHCL STATISTIC TOTAL PROTEIN: Performed by: INTERNAL MEDICINE

## 2019-12-16 PROCEDURE — 36415 COLL VENOUS BLD VENIPUNCTURE: CPT | Performed by: INTERNAL MEDICINE

## 2019-12-16 PROCEDURE — 84165 PROTEIN E-PHORESIS SERUM: CPT | Performed by: INTERNAL MEDICINE

## 2019-12-16 PROCEDURE — 86803 HEPATITIS C AB TEST: CPT | Performed by: INTERNAL MEDICINE

## 2019-12-17 LAB
ALBUMIN SERPL ELPH-MCNC: 4.5 G/DL (ref 3.7–5.1)
ALPHA1 GLOB SERPL ELPH-MCNC: 0.4 G/DL (ref 0.2–0.4)
ALPHA2 GLOB SERPL ELPH-MCNC: 0.7 G/DL (ref 0.5–0.9)
B-GLOBULIN SERPL ELPH-MCNC: 0.8 G/DL (ref 0.6–1)
GAMMA GLOB SERPL ELPH-MCNC: 0.8 G/DL (ref 0.7–1.6)
HCV AB SERPL QL IA: NONREACTIVE
M PROTEIN SERPL ELPH-MCNC: 0 G/DL
PROT PATTERN SERPL ELPH-IMP: NORMAL

## 2019-12-18 DIAGNOSIS — R93.7 ABNORMAL BONE DENSITY SCREENING: Primary | ICD-10-CM

## 2019-12-18 LAB — TRYPTASE SERPL-MCNC: 7.5 UG/L

## 2020-03-26 ENCOUNTER — HOSPITAL ENCOUNTER (EMERGENCY)
Facility: CLINIC | Age: 65
Discharge: HOME | End: 2020-03-26
Attending: NURSE PRACTITIONER | Admitting: NURSE PRACTITIONER
Payer: COMMERCIAL

## 2020-03-26 ENCOUNTER — APPOINTMENT (OUTPATIENT)
Dept: CT IMAGING | Facility: CLINIC | Age: 65
End: 2020-03-26
Attending: NURSE PRACTITIONER
Payer: COMMERCIAL

## 2020-03-26 ENCOUNTER — VIRTUAL VISIT (OUTPATIENT)
Dept: FAMILY MEDICINE | Facility: CLINIC | Age: 65
End: 2020-03-26
Payer: COMMERCIAL

## 2020-03-26 VITALS
OXYGEN SATURATION: 97 % | DIASTOLIC BLOOD PRESSURE: 95 MMHG | WEIGHT: 210 LBS | RESPIRATION RATE: 20 BRPM | TEMPERATURE: 98.9 F | BODY MASS INDEX: 37.21 KG/M2 | SYSTOLIC BLOOD PRESSURE: 187 MMHG | HEIGHT: 63 IN | HEART RATE: 87 BPM

## 2020-03-26 DIAGNOSIS — R11.0 NAUSEA: ICD-10-CM

## 2020-03-26 DIAGNOSIS — K92.1 BLOODY STOOL: Primary | ICD-10-CM

## 2020-03-26 DIAGNOSIS — K57.30 DIVERTICULA OF COLON: ICD-10-CM

## 2020-03-26 DIAGNOSIS — K52.9 COLITIS: ICD-10-CM

## 2020-03-26 LAB
ALBUMIN SERPL-MCNC: 4.2 G/DL (ref 3.4–5)
ALBUMIN UR-MCNC: NEGATIVE MG/DL
ALP SERPL-CCNC: 125 U/L (ref 40–150)
ALT SERPL W P-5'-P-CCNC: 28 U/L (ref 0–50)
ANION GAP SERPL CALCULATED.3IONS-SCNC: 6 MMOL/L (ref 3–14)
APPEARANCE UR: CLEAR
AST SERPL W P-5'-P-CCNC: 16 U/L (ref 0–45)
BASOPHILS # BLD AUTO: 0.1 10E9/L (ref 0–0.2)
BASOPHILS NFR BLD AUTO: 0.6 %
BILIRUB SERPL-MCNC: 0.5 MG/DL (ref 0.2–1.3)
BILIRUB UR QL STRIP: NEGATIVE
BUN SERPL-MCNC: 16 MG/DL (ref 7–30)
CALCIUM SERPL-MCNC: 9.3 MG/DL (ref 8.5–10.1)
CHLORIDE SERPL-SCNC: 109 MMOL/L (ref 94–109)
CO2 SERPL-SCNC: 24 MMOL/L (ref 20–32)
COLOR UR AUTO: YELLOW
CREAT SERPL-MCNC: 0.74 MG/DL (ref 0.52–1.04)
DIFFERENTIAL METHOD BLD: ABNORMAL
EOSINOPHIL # BLD AUTO: 0.1 10E9/L (ref 0–0.7)
EOSINOPHIL NFR BLD AUTO: 0.8 %
ERYTHROCYTE [DISTWIDTH] IN BLOOD BY AUTOMATED COUNT: 12.8 % (ref 10–15)
GFR SERPL CREATININE-BSD FRML MDRD: 84 ML/MIN/{1.73_M2}
GLUCOSE SERPL-MCNC: 122 MG/DL (ref 70–99)
GLUCOSE UR STRIP-MCNC: NEGATIVE MG/DL
HCT VFR BLD AUTO: 44.3 % (ref 35–47)
HEMOCCULT STL QL: NEGATIVE
HGB BLD-MCNC: 15.1 G/DL (ref 11.7–15.7)
HGB UR QL STRIP: ABNORMAL
IMM GRANULOCYTES # BLD: 0 10E9/L (ref 0–0.4)
IMM GRANULOCYTES NFR BLD: 0.3 %
INTERNAL QC OK POCT: YES
KETONES UR STRIP-MCNC: NEGATIVE MG/DL
LEUKOCYTE ESTERASE UR QL STRIP: NEGATIVE
LIPASE SERPL-CCNC: 42 U/L (ref 73–393)
LYMPHOCYTES # BLD AUTO: 1.5 10E9/L (ref 0.8–5.3)
LYMPHOCYTES NFR BLD AUTO: 11.2 %
MCH RBC QN AUTO: 33.5 PG (ref 26.5–33)
MCHC RBC AUTO-ENTMCNC: 34.1 G/DL (ref 31.5–36.5)
MCV RBC AUTO: 98 FL (ref 78–100)
MONOCYTES # BLD AUTO: 0.7 10E9/L (ref 0–1.3)
MONOCYTES NFR BLD AUTO: 4.9 %
MUCOUS THREADS #/AREA URNS LPF: PRESENT /LPF
NEUTROPHILS # BLD AUTO: 11.1 10E9/L (ref 1.6–8.3)
NEUTROPHILS NFR BLD AUTO: 82.2 %
NITRATE UR QL: NEGATIVE
NRBC # BLD AUTO: 0 10*3/UL
NRBC BLD AUTO-RTO: 0 /100
PH UR STRIP: 5 PH (ref 5–7)
PLATELET # BLD AUTO: 324 10E9/L (ref 150–450)
POTASSIUM SERPL-SCNC: 3.6 MMOL/L (ref 3.4–5.3)
PROT SERPL-MCNC: 7.5 G/DL (ref 6.8–8.8)
RBC # BLD AUTO: 4.51 10E12/L (ref 3.8–5.2)
RBC #/AREA URNS AUTO: 2 /HPF (ref 0–2)
SODIUM SERPL-SCNC: 139 MMOL/L (ref 133–144)
SOURCE: ABNORMAL
SP GR UR STRIP: 1.01 (ref 1–1.03)
SQUAMOUS #/AREA URNS AUTO: <1 /HPF (ref 0–1)
TEST CARD LOT NUMBER: NORMAL
UROBILINOGEN UR STRIP-MCNC: 0 MG/DL (ref 0–2)
WBC # BLD AUTO: 13.6 10E9/L (ref 4–11)
WBC #/AREA URNS AUTO: 0 /HPF (ref 0–5)

## 2020-03-26 PROCEDURE — 87086 URINE CULTURE/COLONY COUNT: CPT | Performed by: NURSE PRACTITIONER

## 2020-03-26 PROCEDURE — 80053 COMPREHEN METABOLIC PANEL: CPT | Performed by: NURSE PRACTITIONER

## 2020-03-26 PROCEDURE — 74177 CT ABD & PELVIS W/CONTRAST: CPT

## 2020-03-26 PROCEDURE — 85025 COMPLETE CBC W/AUTO DIFF WBC: CPT | Performed by: NURSE PRACTITIONER

## 2020-03-26 PROCEDURE — 96360 HYDRATION IV INFUSION INIT: CPT | Mod: 59 | Performed by: NURSE PRACTITIONER

## 2020-03-26 PROCEDURE — 99285 EMERGENCY DEPT VISIT HI MDM: CPT | Mod: 25 | Performed by: NURSE PRACTITIONER

## 2020-03-26 PROCEDURE — 83690 ASSAY OF LIPASE: CPT | Performed by: NURSE PRACTITIONER

## 2020-03-26 PROCEDURE — 25800030 ZZH RX IP 258 OP 636: Performed by: NURSE PRACTITIONER

## 2020-03-26 PROCEDURE — 25000128 H RX IP 250 OP 636: Performed by: NURSE PRACTITIONER

## 2020-03-26 PROCEDURE — 81001 URINALYSIS AUTO W/SCOPE: CPT | Performed by: NURSE PRACTITIONER

## 2020-03-26 PROCEDURE — 99214 OFFICE O/P EST MOD 30 MIN: CPT | Mod: TEL | Performed by: FAMILY MEDICINE

## 2020-03-26 PROCEDURE — 25000125 ZZHC RX 250: Performed by: NURSE PRACTITIONER

## 2020-03-26 PROCEDURE — 99284 EMERGENCY DEPT VISIT MOD MDM: CPT | Mod: Z6 | Performed by: NURSE PRACTITIONER

## 2020-03-26 PROCEDURE — 82272 OCCULT BLD FECES 1-3 TESTS: CPT | Performed by: NURSE PRACTITIONER

## 2020-03-26 RX ORDER — IOPAMIDOL 755 MG/ML
100 INJECTION, SOLUTION INTRAVASCULAR ONCE
Status: COMPLETED | OUTPATIENT
Start: 2020-03-26 | End: 2020-03-26

## 2020-03-26 RX ADMIN — SODIUM CHLORIDE 1000 ML: 9 INJECTION, SOLUTION INTRAVENOUS at 15:08

## 2020-03-26 RX ADMIN — SODIUM CHLORIDE 66 ML: 9 INJECTION, SOLUTION INTRAVENOUS at 14:09

## 2020-03-26 RX ADMIN — IOPAMIDOL 100 ML: 755 INJECTION, SOLUTION INTRAVENOUS at 14:08

## 2020-03-26 ASSESSMENT — ENCOUNTER SYMPTOMS
ABDOMINAL DISTENTION: 0
DIARRHEA: 1
ABDOMINAL PAIN: 1
WEAKNESS: 0
ANAL BLEEDING: 0
LIGHT-HEADEDNESS: 0
VOMITING: 0
RECTAL PAIN: 0
SHORTNESS OF BREATH: 0
NUMBNESS: 0
CONSTIPATION: 0
CHILLS: 0
ARTHRALGIAS: 0
COUGH: 0
FATIGUE: 0
BLOOD IN STOOL: 1
HEADACHES: 0
NAUSEA: 1
JOINT SWELLING: 0
DIZZINESS: 0
FEVER: 0
MYALGIAS: 0

## 2020-03-26 ASSESSMENT — MIFFLIN-ST. JEOR: SCORE: 1461.68

## 2020-03-26 NOTE — PROGRESS NOTES
"Dee Dee Shepherd is a 64 year old female who is being evaluated via a billable telephone visit.      The patient has been notified of following:     \"This telephone visit will be conducted via a call between you and your physician/provider. We have found that certain health care needs can be provided without the need for a physical exam.  This service lets us provide the care you need with a short phone conversation.  If a prescription is necessary we can send it directly to your pharmacy.  If lab work is needed we can place an order for that and you can then stop by our lab to have the test done at a later time.    If during the course of the call the physician/provider feels a telephone visit is not appropriate, you will not be charged for this service.\"     Dee Dee Shepherd complains of   Chief Complaint   Patient presents with     Abdominal Pain       I have reviewed and updated the patient's Past Medical History, Social History, Family History and Medication List.    ALLERGIES  Patient has no known allergies.    11:31 AM    Woke up at 1:30 this am with \"bad stomach cramps\".  Got up to the bathroom and had a solid BM and \"dry heaves\" 3 times.  Woke up again at 3:30 and that time and 2 times afterwards had a minimal runny and reddish/purple stool.    States what she is passing is red/pink/purple in color with \"shreds\".  Not at all brown.  Feels volume is pretty small.  Not bright red like a hemorrhoidal bleed which she has had in the past.    States her lower abdomen is \"very sore\".  Had had intermittent chills but does not have anything around with which to measure her temperature.    No cough, SOB or other symptoms.    Has never had anything like this before.    11:39 AM      Assessment/Plan:    ICD-10-CM    1. Bloody stool  K92.1      Reviewed with pt that it sounds like she is passing blood or at least bloody stool.  That, along with chills and abd pain means the best place for her evaluation would be " the emergency room.      Advised her to go to the ER at [facility] for evaluation.  She verbalized understanding.  Pt's sister will drive her there.      Phone call duration:  8  minutes    Dalia Wu DO

## 2020-03-26 NOTE — ED PROVIDER NOTES
History     Chief Complaint   Patient presents with     Abdominal Pain     onset 0130, abd pain, vomiting, diarrhea.      HPI  Dee Dee Shepherd is a 64 year old female with a history of HTN, HLD, and breast cancer s/p mastectomy who presents to the emergency department for evaluation of abdominal pain since ananya this morning. Patient developed abdominal cramp, nausea, and dry heaving at 0130. Since symptoms began patient had two formed bowel movements. After this she had 3 instance of loose bowel movements with 'red shreds' in the stool. She is unsure if this was blood. Current abdominal pain is described as aching and diffuse but worse at the left lower abdomen. No vomiting. Denies fever, dysuria, hematuria, flank pain and pelvic pain. Has not had any recent medication or dietary changes. No recent travel. Denies headache, cough, shortness of breath and chest pain. Patient smokes 1/2 ppd. Being treated with Diuril and Lisinopril for hypertension.    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 with   2/14 nodes positive, ER/MO negative, HER2 negative    1/10 completed 4 cycles of dose dense AC followed by 4 cycles of dose   dense Taxol    3/10 completed radiation       Severe episode of recurrent major depressive disorder, without psychotic features (H) 05/17/2018     Priority: Medium     Primary osteoarthritis of right hip 05/17/2018     Priority: Medium     Essential hypertension with goal blood pressure less than 140/90 05/14/2018     Priority: Medium     Hyperlipidemia LDL goal <130 05/14/2018     Priority: Medium     Abnormal glucose 05/14/2018     Priority: Medium        Past Medical History:    Past Medical History:   Diagnosis Date     Anxiety      Chronic low back pain      Depressive  "disorder      Hyperlipidemia      Hypertension      Malignant neoplasm of breast (female) (H)      Pap smear abnormality of cervix with cytologic evidence of malignancy      Tobacco abuse        Past Surgical History:    Past Surgical History:   Procedure Laterality Date     C LIGATE FALLOPIAN TUBE       MASTECTOMY         Family History:    Family History   Problem Relation Age of Onset     Hypertension Father      Diabetes Brother        Social History:  Marital Status:   [4]  Social History     Tobacco Use     Smoking status: Current Every Day Smoker     Packs/day: 0.50     Years: 35.00     Pack years: 17.50     Types: Cigarettes     Smokeless tobacco: Never Used   Substance Use Topics     Alcohol use: Yes     Drug use: No        Medications:    amoxicillin-clavulanate (AUGMENTIN) 875-125 MG tablet  Diphenhydramine-APAP, sleep,  MG TABS  hydrochlorothiazide (HYDRODIURIL) 25 MG tablet  lisinopril (PRINIVIL/ZESTRIL) 10 MG tablet  potassium chloride ER (KLOR-CON) 20 MEQ CR tablet  sertraline (ZOLOFT) 100 MG tablet  simvastatin (ZOCOR) 40 MG tablet          Review of Systems   Constitutional: Negative for chills, fatigue and fever.   Respiratory: Negative for cough and shortness of breath.    Cardiovascular: Negative for chest pain.   Gastrointestinal: Positive for abdominal pain, blood in stool, diarrhea and nausea. Negative for abdominal distention, anal bleeding, constipation, rectal pain and vomiting.   Musculoskeletal: Negative for arthralgias, joint swelling and myalgias.   Skin: Negative for pallor and rash.   Neurological: Negative for dizziness, weakness, light-headedness, numbness and headaches.   All other systems reviewed and are negative.      Physical Exam   BP: (!) 192/87  Pulse: 104  Temp: 99.1  F (37.3  C)  Resp: 20  Height: 160 cm (5' 3\")  Weight: 95.3 kg (210 lb)  SpO2: 96 %      Physical Exam  Constitutional:       General: She is not in acute distress.     Appearance: She is " well-developed. She is not diaphoretic.   HENT:      Right Ear: Tympanic membrane normal.      Left Ear: Tympanic membrane normal.      Nose: Nose normal.      Mouth/Throat:      Pharynx: No posterior oropharyngeal erythema.   Eyes:      Conjunctiva/sclera: Conjunctivae normal.      Pupils: Pupils are equal, round, and reactive to light.   Neck:      Musculoskeletal: Normal range of motion and neck supple.   Cardiovascular:      Rate and Rhythm: Normal rate and regular rhythm.   Pulmonary:      Effort: Pulmonary effort is normal. No respiratory distress.      Breath sounds: Normal breath sounds and air entry. No decreased air movement. No decreased breath sounds, wheezing or rhonchi.   Chest:      Chest wall: No tenderness.   Abdominal:      General: Bowel sounds are increased. There is no distension.      Palpations: Abdomen is soft.      Tenderness: There is abdominal tenderness in the left lower quadrant. There is no right CVA tenderness, left CVA tenderness or rebound. Negative signs include Miller's sign and McBurney's sign.   Musculoskeletal: Normal range of motion.   Skin:     General: Skin is warm.      Capillary Refill: Capillary refill takes less than 2 seconds.   Neurological:      Mental Status: She is alert and oriented to person, place, and time.         ED Course        Procedures    Results for orders placed or performed during the hospital encounter of 03/26/20 (from the past 24 hour(s))   CBC with platelets differential   Result Value Ref Range    WBC 13.6 (H) 4.0 - 11.0 10e9/L    RBC Count 4.51 3.8 - 5.2 10e12/L    Hemoglobin 15.1 11.7 - 15.7 g/dL    Hematocrit 44.3 35.0 - 47.0 %    MCV 98 78 - 100 fl    MCH 33.5 (H) 26.5 - 33.0 pg    MCHC 34.1 31.5 - 36.5 g/dL    RDW 12.8 10.0 - 15.0 %    Platelet Count 324 150 - 450 10e9/L    Diff Method Automated Method     % Neutrophils 82.2 %    % Lymphocytes 11.2 %    % Monocytes 4.9 %    % Eosinophils 0.8 %    % Basophils 0.6 %    % Immature Granulocytes  0.3 %    Nucleated RBCs 0 0 /100    Absolute Neutrophil 11.1 (H) 1.6 - 8.3 10e9/L    Absolute Lymphocytes 1.5 0.8 - 5.3 10e9/L    Absolute Monocytes 0.7 0.0 - 1.3 10e9/L    Absolute Eosinophils 0.1 0.0 - 0.7 10e9/L    Absolute Basophils 0.1 0.0 - 0.2 10e9/L    Abs Immature Granulocytes 0.0 0 - 0.4 10e9/L    Absolute Nucleated RBC 0.0    Comprehensive metabolic panel   Result Value Ref Range    Sodium 139 133 - 144 mmol/L    Potassium 3.6 3.4 - 5.3 mmol/L    Chloride 109 94 - 109 mmol/L    Carbon Dioxide 24 20 - 32 mmol/L    Anion Gap 6 3 - 14 mmol/L    Glucose 122 (H) 70 - 99 mg/dL    Urea Nitrogen 16 7 - 30 mg/dL    Creatinine 0.74 0.52 - 1.04 mg/dL    GFR Estimate 84 >60 mL/min/[1.73_m2]    GFR Estimate If Black >90 >60 mL/min/[1.73_m2]    Calcium 9.3 8.5 - 10.1 mg/dL    Bilirubin Total 0.5 0.2 - 1.3 mg/dL    Albumin 4.2 3.4 - 5.0 g/dL    Protein Total 7.5 6.8 - 8.8 g/dL    Alkaline Phosphatase 125 40 - 150 U/L    ALT 28 0 - 50 U/L    AST 16 0 - 45 U/L   Lipase   Result Value Ref Range    Lipase 42 (L) 73 - 393 U/L   Occult blood stool POCT   Result Value Ref Range    Occult Blood NEGATIVE neg    Internal QC OK Yes     Test Card Lot Number [id number]    CT Abdomen Pelvis w Contrast    Narrative    CT ABDOMEN AND PELVIS WITH CONTRAST 3/26/2020 2:20 PM    CLINICAL HISTORY: Abdominal pain, diverticulitis suspected.    TECHNIQUE: CT scan of the abdomen and pelvis was performed following  injection of IV contrast. Multiplanar reformats were obtained. Dose  reduction techniques were used.  CONTRAST: 100 mL Isovue-370    COMPARISON: None.    FINDINGS:   LOWER CHEST: Normal.    HEPATOBILIARY: Normal.    PANCREAS: Normal.    SPLEEN: Normal.    ADRENAL GLANDS: Mild thickening of the left adrenal. Unremarkable  right adrenal.    KIDNEYS/BLADDER: There is a focal region of hypoenhancement at the  posterior upper left kidney that is 1.1 cm, series 2 image 28.  Otherwise kidneys show no acute abnormalities. Small cyst at  the right  kidney not requiring specific imaging follow-up.    No hydronephrosis.    BOWEL: Inflammatory wall thickening and edema involving the distal  colon from the splenic flexure through the descending and sigmoid  colon. There are a few scattered colonic diverticula, but the  inflammatory process is somewhat diffuse compared to the typical  diverticulitis. There is no abscess. No bowel obstruction. Normal  appendix. Trace pelvic fluid. No free air.    PELVIC ORGANS: Normal.    ADDITIONAL FINDINGS: Vascular calcifications.    MUSCULOSKELETAL: Spine and, right greater than left left, hip  degenerative changes.      Impression    IMPRESSION:   1.  Distal segmental colitis from the splenic flexure through the  descending and sigmoid colon. No abscess.  2.  There are colonic diverticula but no convincing focal  diverticulitis.  3.  Indeterminant hypoenhancing focal region within the upper left  kidney. Recommend nonurgent renal MRI for further assessment of any  underlying renal mass. This could just be related to variability of  parenchymal enhancement.    AUGUSTINE RUSSELL MD   UA with Microscopic reflex to Culture    Specimen: Midstream Urine   Result Value Ref Range    Color Urine Yellow     Appearance Urine Clear     Glucose Urine Negative NEG^Negative mg/dL    Bilirubin Urine Negative NEG^Negative    Ketones Urine Negative NEG^Negative mg/dL    Specific Gravity Urine 1.010 1.003 - 1.035    Blood Urine Small (A) NEG^Negative    pH Urine 5.0 5.0 - 7.0 pH    Protein Albumin Urine Negative NEG^Negative mg/dL    Urobilinogen mg/dL 0.0 0.0 - 2.0 mg/dL    Nitrite Urine Negative NEG^Negative    Leukocyte Esterase Urine Negative NEG^Negative    Source Midstream Urine     WBC Urine 0 0 - 5 /HPF    RBC Urine 2 0 - 2 /HPF    Squamous Epithelial /HPF Urine <1 0 - 1 /HPF    Mucous Urine Present (A) NEG^Negative /LPF       Medications   0.9% sodium chloride BOLUS (0 mLs Intravenous Stopped 3/26/20 1607)   iopamidol (ISOVUE-370)  solution 100 mL (100 mLs Intravenous Given 3/26/20 1408)   sodium chloride 0.9 % bag 500mL for CT scan flush use (66 mLs As instructed Given 3/26/20 1409)       Assessments & Plan (with Medical Decision Making)   Dee Dee Shepherd is a 64 year old female with a history of HTN, HLD, and breast cancer s/p mastectomy who presents to the emergency department for evaluation of abdominal pain since ananya this morning. Patient developed abdominal cramp, nausea, and dry heaving at 0130. Since symptoms began patient had two formed bowel movements. After this she had 3 instance of loose bowel movements with 'red shreds' in the stool.     Patient is well appearing, nontoxic. Sepsis BPA triggered due to increased heart rate and WBC however I do not feel patient is at risk for sepsis. WBC is increased to 13.6 (other blood work normal), tachycardia resolved to 84 bpm during exam, no hypotension present, afebrile. Tenderness to palpation to the LLQ, otherwise unremarkable physical exam. Occult stool negative. Fluids provided. CT scan with distal colitis, colonic diverticula with no focal diverticulitis. Given exam, blood work and colitis will treat with 10 days of Augmentin. Educated on temporary bland diet. Will follow up with primary care via phone if symptoms not improving. Discussed worrisome reasons to seek emergent reevaluation. Patient agreeable to plan of care and comfortable with discharge. Discharged in stable condition.     I have reviewed the nursing notes.    I have reviewed the findings, diagnosis, plan and need for follow up with the patient.    Discharge Medication List as of 3/26/2020  4:09 PM      START taking these medications    Details   amoxicillin-clavulanate (AUGMENTIN) 875-125 MG tablet Take 1 tablet by mouth 3 times daily for 10 days, Disp-30 tablet,R-0, E-Prescribe             Final diagnoses:   Colitis   Nausea   Diverticula of colon       3/26/2020   [facility] EMERGENCY DEPARTMENT     Colleen Schuler  DELORES Boyer CNP  03/26/20 3294

## 2020-03-26 NOTE — ED NOTES
Pt presents to ED with complaints of abd pain.  States that she was advised by primary office to come to ED for further evaluation.  Pt states that she woke up early this AM with pain, abnormal stools, and nausea.  Pt was dry heaving earlier.  Pt is minimal at this time, but intensifies.  No abd surgical hx.  No fevers at home. Provider at bedside for assessment

## 2020-03-26 NOTE — ED AVS SNAPSHOT
[facility] Emergency Department  [address] [address]  Phone:  [phone]  Fax:  [phone]                                    Dee Dee Shepherd   MRN: [id number]    Department:  [facility] Emergency Department   Date of Visit:  3/26/2020           After Visit Summary Signature Page    I have received my discharge instructions, and my questions have been answered. I have discussed any challenges I see with this plan with the nurse or doctor.    ..........................................................................................................................................  Patient/Patient Representative Signature      ..........................................................................................................................................  Patient Representative Print Name and Relationship to Patient    ..................................................               ................................................  Date                                   Time    ..........................................................................................................................................  Reviewed by Signature/Title    ...................................................              ..............................................  Date                                               Time          22EPIC Rev 08/18

## 2020-03-27 ENCOUNTER — HOSPITAL ENCOUNTER (EMERGENCY)
Facility: CLINIC | Age: 65
Discharge: HOME | End: 2020-03-27
Attending: EMERGENCY MEDICINE | Admitting: EMERGENCY MEDICINE
Payer: COMMERCIAL

## 2020-03-27 ENCOUNTER — NURSE TRIAGE (OUTPATIENT)
Dept: NURSING | Facility: CLINIC | Age: 65
End: 2020-03-27

## 2020-03-27 VITALS
SYSTOLIC BLOOD PRESSURE: 156 MMHG | OXYGEN SATURATION: 97 % | WEIGHT: 210 LBS | DIASTOLIC BLOOD PRESSURE: 77 MMHG | HEIGHT: 63 IN | TEMPERATURE: 98 F | BODY MASS INDEX: 37.21 KG/M2

## 2020-03-27 DIAGNOSIS — R10.84 ABDOMINAL PAIN, GENERALIZED: ICD-10-CM

## 2020-03-27 DIAGNOSIS — K52.9 COLITIS: ICD-10-CM

## 2020-03-27 DIAGNOSIS — K62.5 BRBPR (BRIGHT RED BLOOD PER RECTUM): ICD-10-CM

## 2020-03-27 LAB
BACTERIA SPEC CULT: NORMAL
Lab: NORMAL
SPECIMEN SOURCE: NORMAL

## 2020-03-27 PROCEDURE — 99283 EMERGENCY DEPT VISIT LOW MDM: CPT

## 2020-03-27 PROCEDURE — 99284 EMERGENCY DEPT VISIT MOD MDM: CPT | Mod: Z6 | Performed by: EMERGENCY MEDICINE

## 2020-03-27 ASSESSMENT — ENCOUNTER SYMPTOMS
BLOOD IN STOOL: 1
FEVER: 0
SHORTNESS OF BREATH: 0
ABDOMINAL PAIN: 1

## 2020-03-27 ASSESSMENT — MIFFLIN-ST. JEOR: SCORE: 1461.68

## 2020-03-27 NOTE — RESULT ENCOUNTER NOTE
Emergency Dept/Urgent Care discharge antibiotic (if prescribed): Amoxicillin-Clavulanate (Augmentin) 875-125 mg PO tablet, 1 tablet by mouth 3 times daily for 10 days  Date of Rx (if applicable):  3/26/20  No changes in treatment per Urine culture protocol.

## 2020-03-27 NOTE — TELEPHONE ENCOUNTER
"Went to bathroom today, BM but looks like blood clot (a little larger than a quarter) with tarry consistency. Per protocol FNA advised she return to ER.   Patient voiced understand and will follow disposition.     Heather Ayala RN  [facility] Nurse Advisor        Reason for Disposition    Tarry or jet black-colored stool (not dark green)    Additional Information    Negative: Pale skin (pallor) of new onset or worsening    Negative: [1] MODERATE rectal bleeding (small blood clots, passing blood without stool, or toilet water turns red) AND [2] more than once a day    Negative: SEVERE dizziness (e.g., unable to stand, requires support to walk, feels like passing out now)    Negative: SEVERE rectal bleeding (large blood clots; on and off, or constant bleeding)    Negative: Diarrhea is main symptom    Negative: Stool color other than brown or tan is main concern  (no bleeding and no melena)    Negative: Shock suspected (e.g., cold/pale/clammy skin, too weak to stand, low BP, rapid pulse)    Negative: Difficult to awaken or acting confused (e.g., disoriented, slurred speech)    Negative: Passed out (i.e., lost consciousness, collapsed and was not responding)    Negative: [1] Vomiting AND [2] contains red blood or black (\"coffee ground\") material  (Exception: few red streaks in vomit that only happened once)    Negative: Sounds like a life-threatening emergency to the triager    Protocols used: RECTAL BLEEDING-A-AH      "

## 2020-03-27 NOTE — ED AVS SNAPSHOT
[facility] Emergency Department  [address] [address]  Phone:  [phone]  Fax:  [phone]                                    Dee Dee Shepherd   MRN: [id number]    Department:  [facility] Emergency Department   Date of Visit:  3/27/2020           After Visit Summary Signature Page    I have received my discharge instructions, and my questions have been answered. I have discussed any challenges I see with this plan with the nurse or doctor.    ..........................................................................................................................................  Patient/Patient Representative Signature      ..........................................................................................................................................  Patient Representative Print Name and Relationship to Patient    ..................................................               ................................................  Date                                   Time    ..........................................................................................................................................  Reviewed by Signature/Title    ...................................................              ..............................................  Date                                               Time          22EPIC Rev 08/18

## 2020-03-28 NOTE — ED NOTES
"Pt presents due to 1 episode of rectal bleeding tonight. Dark red blood clot and then wiped with more blood. Pt reports had BM earlier in the day that was without bleeding. Pt states she was was here yesterday due to n/v/d and later in the morning yesterday noted red \"thready\" things. Pt denies dizzy, lightheadedness or SOB.   "

## 2020-03-28 NOTE — ED PROVIDER NOTES
History     Chief Complaint   Patient presents with     Rectal Bleeding     here for colitis yesterday and had large blood pass today     HPI  Dee Dee Shepherd is a 64 year old female who has past medical history significant for hypertension, hyperlipidemia, breast cancer status post mastectomy, presenting to the emergency department after patient had passage of darkened colored stool/clot this evening.  Patient did not have any further episodes of bowel movement throughout the day today.  She was seen in the emergency department yesterday.  I reviewed that visit.  Patient had CT scan of the abdomen/pelvis that showed findings concerning for colitis, and also did have diverticula noted, and therefore patient was treated with Augmentin for diverticulitis.  No severe worsening of pain.  Given the episode of dark stool, quarter size in nature, she called the nurse hotline, and was instructed to come to the emergency department for further evaluation.  No severe abdominal pain.  No fever.  Denies urinary symptoms.    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 with   2/14 nodes positive, ER/NH negative, HER2 negative    1/10 completed 4 cycles of dose dense AC followed by 4 cycles of dose   dense Taxol    3/10 completed radiation       Severe episode of recurrent major depressive disorder, without psychotic features (H) 05/17/2018     Priority: Medium     Primary osteoarthritis of right hip 05/17/2018     Priority: Medium     Essential hypertension with goal blood pressure less than 140/90 05/14/2018     Priority: Medium     Hyperlipidemia LDL goal <130 05/14/2018     Priority: Medium     Abnormal glucose 05/14/2018     Priority: Medium        Past Medical History:    Past Medical History:   Diagnosis  "Date     Anxiety      Chronic low back pain      Depressive disorder      Hyperlipidemia      Hypertension      Malignant neoplasm of breast (female) (H)      Pap smear abnormality of cervix with cytologic evidence of malignancy      Tobacco abuse        Past Surgical History:    Past Surgical History:   Procedure Laterality Date     C LIGATE FALLOPIAN TUBE       MASTECTOMY         Family History:    Family History   Problem Relation Age of Onset     Hypertension Father      Diabetes Brother        Social History:  Marital Status:   [4]  Social History     Tobacco Use     Smoking status: Current Every Day Smoker     Packs/day: 0.50     Years: 35.00     Pack years: 17.50     Types: Cigarettes     Smokeless tobacco: Never Used   Substance Use Topics     Alcohol use: Yes     Drug use: No        Medications:    amoxicillin-clavulanate (AUGMENTIN) 875-125 MG tablet  Diphenhydramine-APAP, sleep,  MG TABS  hydrochlorothiazide (HYDRODIURIL) 25 MG tablet  lisinopril (PRINIVIL/ZESTRIL) 10 MG tablet  potassium chloride ER (KLOR-CON) 20 MEQ CR tablet  sertraline (ZOLOFT) 100 MG tablet  simvastatin (ZOCOR) 40 MG tablet          Review of Systems   Constitutional: Negative for fever.   Respiratory: Negative for shortness of breath.    Cardiovascular: Negative for chest pain.   Gastrointestinal: Positive for abdominal pain and blood in stool.   All other systems reviewed and are negative.      Physical Exam   BP: (!) 156/77  Heart Rate: 86  Temp: 98  F (36.7  C)  Height: 160 cm (5' 3\")  Weight: 95.3 kg (210 lb)  SpO2: 97 %      Physical Exam  BP (!) 156/77   Temp 98  F (36.7  C) (Oral)   Ht 1.6 m (5' 3\")   Wt 95.3 kg (210 lb)   SpO2 97%   BMI 37.20 kg/m    General: alert, interactive, in no apparent distress  Head: atraumatic  Nose: no rhinorrhea or epistaxis  Ears: no external auditory canal discharge or bleeding.    Eyes: Sclera nonicteric. Conjunctiva noninjected. PERRL, EOMI  Mouth: no tonsillar erythema, " edema, or exudate  Neck: supple, no palp LAD  Lungs: CTAB  CV: RRR, S1/S2; peripheral pulses palpable and symmetric  Abdomen: soft, slight LLQ, nd, no guarding or rebound. Positive bowel sounds  Extremities: no cyanosis or edema  Skin: no rash or diaphoresis  Neuro:   strength 5/5 in UE and LEs bilaterally, sensation intact to light touch in UE and LEs bilaterally;       ED Course        Procedures               Critical Care time:  none               No results found for this or any previous visit (from the past 24 hour(s)).    Medications - No data to display    Assessments & Plan (with Medical Decision Making)  64 year old female, presenting to the emergency department after the patient was seen yesterday, diagnosed with colitis, and treated with Augmentin for possible diverticulitis.  Patient with episode of quarter size darkened stool/blood filled stool that was passed during the day today.  No lightheadedness, or dizziness.  No further episodes of stooling throughout the day today.  Does not appear that patient is losing significant amounts of blood.  Had normal hemoglobin performed yesterday.  She is on appropriate therapy.  Abdominal exam is benign, with nonsurgical abdomen which is present.  At this point, I do not feel that any repeat blood testing is indicated.  Did not lose significant amounts of blood, and patient did bring the sample with her.  There is no lightheadedness, or dizziness.  Abdominal exam is benign, and no indication for repeat imaging.  Patient encouraged close monitoring, and follow-up as needed, and instructed to return if new, or worsening symptoms.     I have reviewed the nursing notes.    I have reviewed the findings, diagnosis, plan and need for follow up with the patient.       Discharge Medication List as of 3/27/2020  7:43 PM          Final diagnoses:   Abdominal pain, generalized   Colitis   BRBPR (bright red blood per rectum)       3/27/2020   [facility] EMERGENCY  [facility] Hospital[facility]edrick MD  03/27/20 1956

## 2020-07-09 DIAGNOSIS — F32.5 MAJOR DEPRESSION IN COMPLETE REMISSION (H): ICD-10-CM

## 2020-07-10 RX ORDER — SERTRALINE HYDROCHLORIDE 100 MG/1
TABLET, FILM COATED ORAL
Qty: 270 TABLET | Refills: 0 | Status: SHIPPED | OUTPATIENT
Start: 2020-07-10 | End: 2021-01-06

## 2020-07-10 NOTE — TELEPHONE ENCOUNTER
"Prescription approved per [facility] Refill Protocol.  Gabriela Hamlin RN    Last office visit: 3/26/2020 virtual with prescribing provider:  Bryce     Requested Prescriptions   Pending Prescriptions Disp Refills     sertraline (ZOLOFT) 100 MG tablet [Pharmacy Med Name: SERTRALINE HCL 100MG TABS] 270 tablet 0     Sig: TAKE THREE TABLETS BY MOUTH ONCE DAILY       SSRIs Protocol Failed - 7/9/2020  5:03 AM        Failed - PHQ-9 score less than 5 in past 6 months     Please review last PHQ-9 score.           Passed - Medication is active on med list        Passed - Patient is age 18 or older        Passed - No active pregnancy on record        Passed - No positive pregnancy test in last 12 months        Passed - Recent (6 mo) or future (30 days) visit within the authorizing provider's specialty     Patient had office visit in the last 6 months or has a visit in the next 30 days with authorizing provider or within the authorizing provider's specialty.  See \"Patient Info\" tab in inbasket, or \"Choose Columns\" in Meds & Orders section of the refill encounter.                 "

## 2020-08-14 ENCOUNTER — OFFICE VISIT (OUTPATIENT)
Dept: FAMILY MEDICINE | Facility: CLINIC | Age: 65
End: 2020-08-14
Payer: COMMERCIAL

## 2020-08-14 VITALS
BODY MASS INDEX: 33.59 KG/M2 | WEIGHT: 189.6 LBS | TEMPERATURE: 98 F | HEIGHT: 63 IN | DIASTOLIC BLOOD PRESSURE: 73 MMHG | HEART RATE: 72 BPM | SYSTOLIC BLOOD PRESSURE: 116 MMHG

## 2020-08-14 DIAGNOSIS — E55.9 VITAMIN D DEFICIENCY: ICD-10-CM

## 2020-08-14 DIAGNOSIS — E78.5 HYPERLIPIDEMIA LDL GOAL <130: ICD-10-CM

## 2020-08-14 DIAGNOSIS — E03.8 SUBCLINICAL HYPOTHYROIDISM: ICD-10-CM

## 2020-08-14 DIAGNOSIS — Z12.11 SPECIAL SCREENING FOR MALIGNANT NEOPLASMS, COLON: ICD-10-CM

## 2020-08-14 DIAGNOSIS — R73.09 ABNORMAL GLUCOSE: ICD-10-CM

## 2020-08-14 DIAGNOSIS — Q63.9 RENAL STRUCTURAL ABNORMALITY: ICD-10-CM

## 2020-08-14 DIAGNOSIS — K52.9 NON-SPECIFIC COLITIS: ICD-10-CM

## 2020-08-14 DIAGNOSIS — I10 ESSENTIAL HYPERTENSION WITH GOAL BLOOD PRESSURE LESS THAN 140/90: ICD-10-CM

## 2020-08-14 DIAGNOSIS — M16.11 PRIMARY OSTEOARTHRITIS OF RIGHT HIP: ICD-10-CM

## 2020-08-14 DIAGNOSIS — Z00.00 ENCOUNTER FOR MEDICARE ANNUAL WELLNESS EXAM: Primary | ICD-10-CM

## 2020-08-14 PROCEDURE — 90471 IMMUNIZATION ADMIN: CPT | Performed by: FAMILY MEDICINE

## 2020-08-14 PROCEDURE — 99397 PER PM REEVAL EST PAT 65+ YR: CPT | Mod: 25 | Performed by: FAMILY MEDICINE

## 2020-08-14 PROCEDURE — 90715 TDAP VACCINE 7 YRS/> IM: CPT | Performed by: FAMILY MEDICINE

## 2020-08-14 ASSESSMENT — PATIENT HEALTH QUESTIONNAIRE - PHQ9
SUM OF ALL RESPONSES TO PHQ QUESTIONS 1-9: 7
5. POOR APPETITE OR OVEREATING: NOT AT ALL

## 2020-08-14 ASSESSMENT — ANXIETY QUESTIONNAIRES
5. BEING SO RESTLESS THAT IT IS HARD TO SIT STILL: NOT AT ALL
2. NOT BEING ABLE TO STOP OR CONTROL WORRYING: MORE THAN HALF THE DAYS
3. WORRYING TOO MUCH ABOUT DIFFERENT THINGS: MORE THAN HALF THE DAYS
1. FEELING NERVOUS, ANXIOUS, OR ON EDGE: SEVERAL DAYS
7. FEELING AFRAID AS IF SOMETHING AWFUL MIGHT HAPPEN: NOT AT ALL
IF YOU CHECKED OFF ANY PROBLEMS ON THIS QUESTIONNAIRE, HOW DIFFICULT HAVE THESE PROBLEMS MADE IT FOR YOU TO DO YOUR WORK, TAKE CARE OF THINGS AT HOME, OR GET ALONG WITH OTHER PEOPLE: NOT DIFFICULT AT ALL
GAD7 TOTAL SCORE: 6
6. BECOMING EASILY ANNOYED OR IRRITABLE: SEVERAL DAYS

## 2020-08-14 ASSESSMENT — MIFFLIN-ST. JEOR: SCORE: 1361.21

## 2020-08-14 ASSESSMENT — ACTIVITIES OF DAILY LIVING (ADL): CURRENT_FUNCTION: NO ASSISTANCE NEEDED

## 2020-08-14 NOTE — PROGRESS NOTES
"SUBJECTIVE:   Dee Dee Shepherd is a 64 year old female who presents for Preventive Visit.    Are you in the first 12 months of your Medicare coverage?  No    Healthy Habits:    In general, how would you rate your overall health?  Good    Frequency of exercise:  None    Do you usually eat at least 4 servings of fruit and vegetables a day, include whole grains    & fiber and avoid regularly eating high fat or \"junk\" foods?  No    Taking medications regularly:  Yes    Barriers to taking medications:  None    Medication side effects:  None    Ability to successfully perform activities of daily living:  No assistance needed    Home Safety:  No safety concerns identified    Hearing Impairment:  No hearing concerns    In the past 6 months, have you been bothered by leaking of urine?  No    In general, how would you rate your overall mental or emotional health?  Good      PHQ-2 Total Score:    Additional concerns today:  No    Do you feel safe in your environment? Yes    Have you ever done Advance Care Planning? (For example, a Health Directive, POLST, or a discussion with a medical provider or your loved ones about your wishes): No, advance care planning information given to patient to review.  Patient plans to discuss their wishes with loved ones or provider.        Fall risk  Fallen 2 or more times in the past year?: No  Any fall with injury in the past year?: No    Cognitive Screening   1) Repeat 3 items (Leader, Season, Table)    2) Clock draw: NORMAL  3) 3 item recall: Recalls 1 object   Results: NORMAL clock, 1-2 items recalled: COGNITIVE IMPAIRMENT LESS LIKELY    Mini-CogTM Copyright MALLIKA Avitia. Licensed by the author for use in [facility]; reprinted with permission (mercedes@.[facility]). All rights reserved.      Do you have sleep apnea, excessive snoring or daytime drowsiness?: yes    Reviewed and updated as needed this visit by clinical staff  Tobacco  Allergies  Meds  Med Hx  Surg Hx  Fam Hx  Soc Hx " "       Reviewed and updated as needed this visit by Provider  Tobacco  Allergies  Meds  Med Hx  Surg Hx  Fam Hx  Soc Hx       Social History     Tobacco Use     Smoking status: Current Every Day Smoker     Packs/day: 0.50     Years: 35.00     Pack years: 17.50     Types: Cigarettes     Smokeless tobacco: Never Used   Substance Use Topics     Alcohol use: Yes     If you drink alcohol do you typically have >3 drinks per day or >7 drinks per week? No    Alcohol Use 8/14/2020   Prescreen: >3 drinks/day or >7 drinks/week? -   Prescreen: >3 drinks/day or >7 drinks/week? No           Current providers sharing in care for this patient include:   Patient Care Team:  Dalia Wu DO as PCP - General (Family Practice)  Dalia Wu DO as Assigned PCP    The following health maintenance items are reviewed in Epic and correct as of today:  Health Maintenance   Topic Date Due     LUNG CANCER SCREENING ANNUAL  06/13/2020     INFLUENZA VACCINE (1) 09/01/2020     PNEUMOCOCCAL IMMUNIZATION 65+ LOW/MEDIUM RISK (2 of 2 - PPSV23) 11/11/2020     PHQ-9  02/14/2021     MAMMO SCREENING  06/13/2021     MEDICARE ANNUAL WELLNESS VISIT  08/14/2021     FALL RISK ASSESSMENT  08/14/2021     COLORECTAL CANCER SCREENING  12/18/2023     LIPID  05/17/2024     ADVANCE CARE PLANNING  11/17/2024     DTAP/TDAP/TD IMMUNIZATION (3 - Td) 08/14/2030     DEXA  Completed     HEPATITIS C SCREENING  Completed     DEPRESSION ACTION PLAN  Completed     ZOSTER IMMUNIZATION  Completed     IPV IMMUNIZATION  Aged Out     MENINGITIS IMMUNIZATION  Aged Out     HEPATITIS B IMMUNIZATION  Aged Out         Review of Systems  Constitutional, HEENT, cardiovascular, pulmonary, gi and gu systems are negative, except as otherwise noted.    OBJECTIVE:   /73   Pulse 72   Temp 98  F (36.7  C) (Tympanic)   Ht 1.588 m (5' 2.5\")   Wt 86 kg (189 lb 9.6 oz)   Breastfeeding No   BMI 34.13 kg/m   Estimated body mass index is 34.13 kg/m  as calculated from the " "following:    Height as of this encounter: 1.588 m (5' 2.5\").    Weight as of this encounter: 86 kg (189 lb 9.6 oz).  Physical Exam  GENERAL APPEARANCE: healthy, alert and no distress  EYES: Eyes grossly normal to inspection, PERRL and conjunctivae and sclerae normal  HENT: ear canals and TM's normal, nose and mouth without ulcers or lesions, oropharynx clear and oral mucous membranes moist  NECK: no adenopathy, no asymmetry, masses, or scars and thyroid normal to palpation  RESP: lungs clear to auscultation - no rales, rhonchi or wheezes  BREAST: declined  CV: regular rate and rhythm, normal S1 S2, no S3 or S4, no murmur, click or rub, no peripheral edema and peripheral pulses strong  ABDOMEN: soft, nontender, no hepatosplenomegaly, no masses and bowel sounds normal  MS: no musculoskeletal defects are noted and gait is age appropriate without ataxia  NEURO: Normal strength and tone, sensory exam grossly normal, mentation intact and speech normal  PSYCH: mentation appears normal and affect normal/bright    Diagnostic Test Results:  Labs reviewed in Epic    ASSESSMENT / PLAN:       ICD-10-CM    1. Encounter for Medicare annual wellness exam  Z00.00    2. Primary osteoarthritis of right hip  M16.11 Orthopedic & Spine  Referral   3. Essential hypertension with goal blood pressure less than 140/90  I10 **Comprehensive metabolic panel FUTURE anytime     CANCELED: Comprehensive metabolic panel (BMP + Alb, Alk Phos, ALT, AST, Total. Bili, TP)   4. Hyperlipidemia LDL goal <130  E78.5 Lipid panel reflex to direct LDL Fasting     **Comprehensive metabolic panel FUTURE anytime     CANCELED: Comprehensive metabolic panel (BMP + Alb, Alk Phos, ALT, AST, Total. Bili, TP)     CANCELED: Lipid panel reflex to direct LDL Fasting   5. Non-specific colitis  K52.9 GASTROENTEROLOGY ADULT REF PROCEDURE ONLY   6. Vitamin D deficiency  E55.9 **Vitamin D Deficiency FUTURE anytime     CANCELED: Vitamin D Deficiency   7. Subclinical " "hypothyroidism  E03.9 **TSH with free T4 reflex FUTURE anytime     CANCELED: TSH with free T4 reflex   8. Renal structural abnormality  Q63.9 MR Renal wo & w Contrast   9. Abnormal glucose  R73.09 **Comprehensive metabolic panel FUTURE anytime     **A1C FUTURE anytime     CANCELED: Hemoglobin A1c     CANCELED: Comprehensive metabolic panel (BMP + Alb, Alk Phos, ALT, AST, Total. Bili, TP)   10. Special screening for malignant neoplasms, colon  Z12.11 GASTROENTEROLOGY ADULT REF PROCEDURE ONLY     HTN:  Controlled, continue meds    Lipids:  Controlled, continue meds    Abnormal glucose:  Pending    H/o nonspecific colitis:  Plan to update colonoscopy for additional eval    OA hip:  Longstanding problem, not ready to consider surgery.  Referred back to consider additional injection    H/o renal abnormality incidentally noted on CT for colitis:  Plan MRI in f/u    COUNSELING:  Reviewed preventive health counseling, as reflected in patient instructions  Special attention given to:       Regular exercise       Healthy diet/nutrition       Fall risk prevention       Osteoporosis Prevention/Bone Health       Colon cancer screening    Estimated body mass index is 34.13 kg/m  as calculated from the following:    Height as of this encounter: 1.588 m (5' 2.5\").    Weight as of this encounter: 86 kg (189 lb 9.6 oz).    Weight management plan: Discussed healthy diet and exercise guidelines     reports that she has been smoking cigarettes. She has a 17.50 pack-year smoking history. She has never used smokeless tobacco.  Tobacco Cessation Action Plan: Information offered: Patient not interested at this time    Appropriate preventive services were discussed with this patient, including applicable screening as appropriate for cardiovascular disease, diabetes, osteopenia/osteoporosis, and glaucoma.  As appropriate for age/gender, discussed screening for colorectal cancer, prostate cancer, breast cancer, and cervical cancer. Checklist " reviewing preventive services available has been given to the patient.    Reviewed patients plan of care and provided an AVS. The Intermediate Care Plan ( asthma action plan, low back pain action plan, and migraine action plan) for Dee Dee meets the Care Plan requirement. This Care Plan has been established and reviewed with the Patient.    Counseling Resources:  ATP IV Guidelines  Pooled Cohorts Equation Calculator  Breast Cancer Risk Calculator  FRAX Risk Assessment  ICSI Preventive Guidelines  Dietary Guidelines for Americans, 2010  USDA's MyPlate  ASA Prophylaxis  Lung CA Screening    Dalia Wu DO  [facility]    Identified Health Risks:    Patient Instructions   We updated your tetanus and pertussis vaccine today.    Please call [phone] to schedule the MRI of your kidney for the area they saw on your CT scan this spring    Please also schedule the colonoscopy when you get a chance.    I will message Dr Burger regarding follow up for your bone density scan.    You should be getting a call to schedule with Dr Nunes for the hip and back.    Please plan on coming back for fasting blood work when you can.  You will need to be fasting for 12 hours (nothing but water) prior to your blood work.      Preventive Health Recommendations    See your health care provider every year to    Review health changes.     Discuss preventive care.      Review your medicines if your doctor has prescribed any.    You no longer need a yearly Pap test unless you've had an abnormal Pap test in the past 10 years. If you have vaginal symptoms, such as bleeding or discharge, be sure to talk with your provider about a Pap test.    Every 1 to 2 years, have a mammogram.  If you are over 69, talk with your health care provider about whether or not you want to continue having screening mammograms.    Every 10 years, have a colonoscopy. Or, have a yearly FIT test (stool test). These exams will check for colon cancer.      Have a cholesterol test every 5 years, or more often if your doctor advises it.     Have a diabetes test (fasting glucose) every three years. If you are at risk for diabetes, you should have this test more often.     At age 65, have a bone density scan (DEXA) to check for osteoporosis (brittle bone disease).    Shots:    Get a flu shot each year.    Get a tetanus shot every 10 years.    Talk to your doctor about your pneumonia vaccines. There are now two you should receive - Pneumovax (PPSV 23) and Prevnar (PCV 13).    Talk to your pharmacist about the shingles vaccine.    Talk to your doctor about the hepatitis B vaccine.    Nutrition:     Eat at least 5 servings of fruits and vegetables each day.    Eat whole-grain bread, whole-wheat pasta and brown rice instead of white grains and rice.    Get adequate Calcium and Vitamin D.     Lifestyle    Exercise at least 150 minutes a week (30 minutes a day, 5 days a week). This will help you control your weight and prevent disease.    Limit alcohol to one drink per day.    No smoking.     Wear sunscreen to prevent skin cancer.     See your dentist twice a year for an exam and cleaning.    See your eye doctor every 1 to 2 years to screen for conditions such as glaucoma, macular degeneration and cataracts.

## 2020-08-14 NOTE — NURSING NOTE
"Initial BP (!) 141/82   Pulse 72   Temp 98  F (36.7  C) (Tympanic)   Ht 1.588 m (5' 2.5\")   Wt 86 kg (189 lb 9.6 oz)   Breastfeeding No   BMI 34.13 kg/m   Estimated body mass index is 34.13 kg/m  as calculated from the following:    Height as of this encounter: 1.588 m (5' 2.5\").    Weight as of this encounter: 86 kg (189 lb 9.6 oz). .      "

## 2020-08-14 NOTE — PATIENT INSTRUCTIONS
We updated your tetanus and pertussis vaccine today.    Please call [phone] to schedule the MRI of your kidney for the area they saw on your CT scan this spring    Please also schedule the colonoscopy when you get a chance.    I will message Dr Burger regarding follow up for your bone density scan.    You should be getting a call to schedule with Dr Nunes for the hip and back.    Please plan on coming back for fasting blood work when you can.  You will need to be fasting for 12 hours (nothing but water) prior to your blood work.      Preventive Health Recommendations    See your health care provider every year to    Review health changes.     Discuss preventive care.      Review your medicines if your doctor has prescribed any.    You no longer need a yearly Pap test unless you've had an abnormal Pap test in the past 10 years. If you have vaginal symptoms, such as bleeding or discharge, be sure to talk with your provider about a Pap test.    Every 1 to 2 years, have a mammogram.  If you are over 69, talk with your health care provider about whether or not you want to continue having screening mammograms.    Every 10 years, have a colonoscopy. Or, have a yearly FIT test (stool test). These exams will check for colon cancer.     Have a cholesterol test every 5 years, or more often if your doctor advises it.     Have a diabetes test (fasting glucose) every three years. If you are at risk for diabetes, you should have this test more often.     At age 65, have a bone density scan (DEXA) to check for osteoporosis (brittle bone disease).    Shots:    Get a flu shot each year.    Get a tetanus shot every 10 years.    Talk to your doctor about your pneumonia vaccines. There are now two you should receive - Pneumovax (PPSV 23) and Prevnar (PCV 13).    Talk to your pharmacist about the shingles vaccine.    Talk to your doctor about the hepatitis B vaccine.    Nutrition:     Eat at least 5 servings of fruits and  vegetables each day.    Eat whole-grain bread, whole-wheat pasta and brown rice instead of white grains and rice.    Get adequate Calcium and Vitamin D.     Lifestyle    Exercise at least 150 minutes a week (30 minutes a day, 5 days a week). This will help you control your weight and prevent disease.    Limit alcohol to one drink per day.    No smoking.     Wear sunscreen to prevent skin cancer.     See your dentist twice a year for an exam and cleaning.    See your eye doctor every 1 to 2 years to screen for conditions such as glaucoma, macular degeneration and cataracts.

## 2020-08-15 ASSESSMENT — ANXIETY QUESTIONNAIRES: GAD7 TOTAL SCORE: 6

## 2020-08-17 ENCOUNTER — TELEPHONE (OUTPATIENT)
Dept: FAMILY MEDICINE | Facility: CLINIC | Age: 65
End: 2020-08-17

## 2020-08-17 NOTE — TELEPHONE ENCOUNTER
Pt called left message to return call to clinic.    Lolita Figueroa  [facility]

## 2020-08-17 NOTE — TELEPHONE ENCOUNTER
Please give Dee Dee a call.  At her recent visit we discussed follow up for her bone density scan.  Dr Burger, the endocrinologist she saw, wanted her to visit with the genetics clinic.  He placed a referral.  She should be able to call and schedule an appointment with them at   [facility] [place] [phone]     Dalia Wu DO

## 2020-08-18 NOTE — PROGRESS NOTES
Dee Dee Shepherd  :  1955  DOS: 2020  MRN: [id number]    Sports Medicine Clinic Visit    PCP: Dalia Wu    Dee Dee Shepherd is a 63 year old female who is seen in follow-up for Dr Staples, at the request of Dr Wu presenting with chronic right hip and radiating low back pain.    Injury: Gradual onset of chronic radiating right hip and low back pain over the past 3 - 4 years, worsening over the last ~ 3 months.  Pain located over right deep anterior, lateral and posterior hip, right lower lumbar spine, radiating to lateral anterior thigh.  Reports intermittent radiating, burning pain to right lateral thigh.  Additional Features:  Positive: grinding, weakness and decreased hip ROM.  Symptoms are better with Tylenol and Rest.  Symptoms are worse with: going from sit to stand, walking, donning socks, lying on either side, heavy lifting.  Other evaluation and/or treatments so far consists of: Tylenol, Ibuprofen, Rest and PCP, Sports Med (Dr Staples), HEP.  Recent imaging completed: X-rays completed 3/24/17.  Prior History of related problems: Chronic right hip pain.  Patient was last seen by Dr Staples on 18, decided on trial of HEP at that time, which provided minimal relief.    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  Right hip intra-articular injection completed on 19 provided 1 month relief for 50%. Saw her GP and she suggested trying another injection.   No new injury in the interim.      Review of Systems  Musculoskeletal: as above  Remainder of review of systems is negative including constitutional, CV, pulmonary, GI, Skin and Neurologic except as noted in HPI or medical history.    Past Medical History:   Diagnosis Date     Anxiety      Chronic low back pain      Depressive disorder      Hyperlipidemia      Hypertension      Malignant neoplasm of breast  "(female) (H)     right     Pap smear abnormality of cervix with cytologic evidence of malignancy      Tobacco abuse      Past Surgical History:   Procedure Laterality Date     C LIGATE FALLOPIAN TUBE       MASTECTOMY         Objective  /81 (BP Location: Right arm, Patient Position: Chair)   Ht 1.6 m (5' 3\")   Wt 85.7 kg (189 lb)   BMI 33.48 kg/m      General: healthy, alert and in no distress    HEENT: no scleral icterus or conjunctival erythema   Skin: no suspicious lesions or rash. No jaundice.   CV: regular rhythm by palpation, 2+ distal pulses, no pedal edema    Resp: normal respiratory effort without conversational dyspnea   Psych: normal mood and affect    Gait: antalgic, appropriate coordination and balance   Neuro: normal light touch sensory exam of the extremities. Motor strength as noted below     Right hip exam    Inspection:        no edema or ecchymosis in hip area    ROM:       Limited active and passive ROM with flexion, extension, IR, ER, abduction    Strength:        Mildly limited resisted hip flexion due to pain, mild b/l deconditioning of hip stabilizers in general    Tender:        greater trochanter mild/moderate       SI joint       Right lower lumbar paraspinals right > left    Non Tender:        remainder of hip area       illiac crest       ASIS    Sensation:        grossly intact in hip and thigh    Skin:       well perfused       capillary refill brisk    Special Tests:        equivocal MARQUISE       positive (+) FADIR       positive (+) scour       equivocal Denice       Neg SLR, slump for clear radicular sx    Radiology  XR PELVIS AND HIP RIGHT 1 VIEW  3/24/2017 4:15 PM     HISTORY:  Pain in right hip     COMPARISON:  None.                                                                   IMPRESSION:  Moderate to advanced degenerative changes of both hips.  Significant superolateral joint space narrowing bilaterally, left  greater than right. No acute process. "     Procedures  Assessment:  1. Chronic right hip pain    2. Primary osteoarthritis of right hip        Plan:  Discussed the assessment with the patient.  Follow up: prn based on clinical progress  Suspect lumbar complaints are more compensatory, hip is still clearly the primary pain generator  XR images independently visualized and reviewed with patient today in clinic  US guide IA hip CSI last year gave good initial relief, and moderate relief for about 3-4 weeks  Low impact activity options reviewed   PT available anytime as well  Plan to return to Dr Hernandez to review SREE options  Supportive care reviewed  All questions were answered today  Contact us with additional questions or concerns  Signs and sx of concern reviewed      Sukhdev Nunes DO, RENATE  Primary Care Sports Medicine  [facility]               Disclaimer: This note consists of symbols derived from keyboarding, dictation and/or voice recognition software. As a result, there may be errors in the script that have gone undetected. Please consider this when interpreting information found in this chart.

## 2020-08-21 ENCOUNTER — OFFICE VISIT (OUTPATIENT)
Dept: ORTHOPEDICS | Facility: CLINIC | Age: 65
End: 2020-08-21
Attending: FAMILY MEDICINE
Payer: COMMERCIAL

## 2020-08-21 VITALS
BODY MASS INDEX: 33.49 KG/M2 | DIASTOLIC BLOOD PRESSURE: 81 MMHG | SYSTOLIC BLOOD PRESSURE: 121 MMHG | WEIGHT: 189 LBS | HEIGHT: 63 IN

## 2020-08-21 DIAGNOSIS — G89.29 CHRONIC RIGHT HIP PAIN: Primary | ICD-10-CM

## 2020-08-21 DIAGNOSIS — M16.11 PRIMARY OSTEOARTHRITIS OF RIGHT HIP: ICD-10-CM

## 2020-08-21 DIAGNOSIS — M25.551 CHRONIC RIGHT HIP PAIN: Primary | ICD-10-CM

## 2020-08-21 PROCEDURE — 99213 OFFICE O/P EST LOW 20 MIN: CPT | Performed by: FAMILY MEDICINE

## 2020-08-21 ASSESSMENT — MIFFLIN-ST. JEOR: SCORE: 1366.43

## 2020-08-21 NOTE — LETTER
2020         RE: Dee Dee Shepherd  [address]  [place] [address]        Dear Colleague,    Thank you for referring your patient, Dee Dee Shepherd, to the [facility]. Please see a copy of my visit note below.    Dee Dee Shepherd  :  1955  DOS: 2020  MRN: [id number]    Sports Medicine Clinic Visit    PCP: Dalia Wu    Dee Dee Shepherd is a 63 year old female who is seen in follow-up for Dr Staples, at the request of Dr Wu presenting with chronic right hip and radiating low back pain.    Injury: Gradual onset of chronic radiating right hip and low back pain over the past 3 - 4 years, worsening over the last ~ 3 months.  Pain located over right deep anterior, lateral and posterior hip, right lower lumbar spine, radiating to lateral anterior thigh.  Reports intermittent radiating, burning pain to right lateral thigh.  Additional Features:  Positive: grinding, weakness and decreased hip ROM.  Symptoms are better with Tylenol and Rest.  Symptoms are worse with: going from sit to stand, walking, donning socks, lying on either side, heavy lifting.  Other evaluation and/or treatments so far consists of: Tylenol, Ibuprofen, Rest and PCP, Sports Med (Dr Staples), HEP.  Recent imaging completed: X-rays completed 3/24/17.  Prior History of related problems: Chronic right hip pain.  Patient was last seen by Dr Staples on 18, decided on trial of HEP at that time, which provided minimal relief.    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  Right hip intra-articular injection completed on 19 provided 1 month relief for 50%. Saw her GP and she suggested trying another injection.   No new injury in the interim.      Review of Systems  Musculoskeletal: as above  Remainder of review of systems is negative including  "              After Visit Summary   3/8/2017    Dimple Oviedo    MRN: [id number]           Patient Information     Date Of Birth          6/23/1935        Visit Information        Provider Department      3/8/2017 1:40 PM Shavon Bonilla MD [facility]        Care Instructions    PATIENT INSTRUCTIONS:  1. We discussed your recent clinic visit with your rheumatologist. Please complete follow up visit with your clinic.  2. Regarding use of over-the-counter medications:  A) May consider as needed use of \"NSAID\" medications - these include things like Advil (Ibuprofen) or Aleve (Naproxen).   For Advil (Ibuprofen), do not take more than 600-800 mg every 6-8 hours as needed. MAX 1800 mg per day. Take with food/beverage. This can cause some stomach irritation, effects on heart or kidneys.   For Aleve (Naproxen), do not take more than 500 mg every 12 hours as needed. MAX 1000 mg per day. Take with food/beverage. This can cause some stomach irritation, effects on heart or kidneys.  B) May consider as needed use of Tylenol (Acetaminophen).   For Tylenol (Acetaminophen), do not take more than 500-1000 mg every 4-6 hours as needed. MAX 2000 mg per day. This can cause effects on liver.   2. Continue your home therapy program.   3. We talked about possible spinal injection. You wanted to hold off on this right now, since your pain has been somewhat better controlled recently.   4. Return to clinic as needed. If you decide you would like injection, you can call the clinic and we can order this directly for you.   Thank you!  Scheduling number to the [facility]: [phone]. Call this number to schedule or change appointments.    Nurse Triage line: [phone]  Call this number with any questions or concerns. You can leave a message anytime. Please leave a detailed message. Calls are returned between 8 AM and 4 PM Monday through Thursday and from 8 AM to 12 Noon on Fridays. We usually " "constitutional, CV, pulmonary, GI, Skin and Neurologic except as noted in HPI or medical history.    Past Medical History:   Diagnosis Date     Anxiety      Chronic low back pain      Depressive disorder      Hyperlipidemia      Hypertension      Malignant neoplasm of breast (female) (H)     right     Pap smear abnormality of cervix with cytologic evidence of malignancy      Tobacco abuse      Past Surgical History:   Procedure Laterality Date     C LIGATE FALLOPIAN TUBE       MASTECTOMY         Objective  /81 (BP Location: Right arm, Patient Position: Chair)   Ht 1.6 m (5' 3\")   Wt 85.7 kg (189 lb)   BMI 33.48 kg/m      General: healthy, alert and in no distress    HEENT: no scleral icterus or conjunctival erythema   Skin: no suspicious lesions or rash. No jaundice.   CV: regular rhythm by palpation, 2+ distal pulses, no pedal edema    Resp: normal respiratory effort without conversational dyspnea   Psych: normal mood and affect    Gait: antalgic, appropriate coordination and balance   Neuro: normal light touch sensory exam of the extremities. Motor strength as noted below     Right hip exam    Inspection:        no edema or ecchymosis in hip area    ROM:       Limited active and passive ROM with flexion, extension, IR, ER, abduction    Strength:        Mildly limited resisted hip flexion due to pain, mild b/l deconditioning of hip stabilizers in general    Tender:        greater trochanter mild/moderate       SI joint       Right lower lumbar paraspinals right > left    Non Tender:        remainder of hip area       illiac crest       ASIS    Sensation:        grossly intact in hip and thigh    Skin:       well perfused       capillary refill brisk    Special Tests:        equivocal MARQUISE       positive (+) FADIR       positive (+) scour       equivocal Denice       Neg SLR, slump for clear radicular sx    Radiology  XR PELVIS AND HIP RIGHT 1 VIEW  3/24/2017 4:15 PM     HISTORY:  Pain in right " get back to you within 24 to 48 hours depending on the issue/request.     We believe regular attendance is key to your success in our program.    Any time you are unable to keep your appointment we ask that you call us at least 24 hours in advance to let us know. This will allow us to offer the appointment time to another patient.               Follow-ups after your visit        Who to contact     If you have questions or need follow up information about today's clinic visit or your schedule please contact [facility] directly at [phone].  Normal or non-critical lab and imaging results will be communicated to you by [employer], letter or phone within 4 business days after the clinic has received the results. If you do not hear from us within 7 days, please contact the clinic through [employer] or phone. If you have a critical or abnormal lab result, we will notify you by phone as soon as possible.  Submit refill requests through [employer] or call your pharmacy and they will forward the refill request to us. Please allow 3 business days for your refill to be completed.          Additional Information About Your Visit        [employer] Information     [employer] gives you secure access to your electronic health record. If you see a primary care provider, you can also send messages to your care team and make appointments. If you have questions, please call your primary care clinic.  If you do not have a primary care provider, please call [phone] and they will assist you.        Care EveryWhere ID     This is your Care EveryWhere ID. This could be used by other organizations to access your [facility] medical records  [id number]        Your Vitals Were     Pulse Respirations BMI (Body Mass Index)             70 18 35.11 kg/m2          Blood Pressure from Last 3 Encounters:   03/08/17 158/68   02/22/17 122/68   01/20/17 122/64    Weight from Last 3 Encounters:   03/08/17 76.2 kg (168 lb)   02/22/17 76.5 kg  (168 lb 12 oz)   01/20/17 75.3 kg (166 lb)              Today, you had the following     No orders found for display         Today's Medication Changes          These changes are accurate as of: 3/8/17  2:16 PM.  If you have any questions, ask your nurse or doctor.               These medicines have changed or have updated prescriptions.        Dose/Directions    aspirin 81 MG tablet   This may have changed:  See the new instructions.   Used for:  Routine medical exam        ONE DAILY   Quantity:  100   Refills:  3                Primary Care Provider Office Phone # Fax #    Pop Antonino Zapien [phone]       [address] [address]        Thank you!     Thank you for choosing [facility]  for your care. Our goal is always to provide you with excellent care. Hearing back from our patients is one way we can continue to improve our services. Please take a few minutes to complete the written survey that you may receive in the mail after your visit with us. Thank you!             Your Updated Medication List - Protect others around you: Learn how to safely use, store and throw away your medicines at www.disposemymeds.org.          This list is accurate as of: 3/8/17  2:16 PM.  Always use your most recent med list.                   Brand Name Dispense Instructions for use    acetaminophen 650 MG 8 hour tablet     100 tablet    Take 650 mg by mouth every 8 hours as needed for mild pain or fever       alendronate 70 MG tablet    FOSAMAX    13 tablet    Take 1 tablet (70 mg) by mouth every 7 days Take with over 8 ounces water and stay upright for at least 30 minutes after dose.  Take at least 60 minutes before breakfast       aspirin 81 MG tablet     100    ONE DAILY       CALCIUM 500 + D 500-200 MG-IU Tabs      1 tablets 3 times daily       Fish Oil 500 MG Caps      Take 1 capsule by mouth 3 times daily       irbesartan 300 MG tablet     hip     COMPARISON:  None.                                                                   IMPRESSION:  Moderate to advanced degenerative changes of both hips.  Significant superolateral joint space narrowing bilaterally, left  greater than right. No acute process.     Procedures  Assessment:  1. Chronic right hip pain    2. Primary osteoarthritis of right hip        Plan:  Discussed the assessment with the patient.  Follow up: prn based on clinical progress  Suspect lumbar complaints are more compensatory, hip is still clearly the primary pain generator  XR images independently visualized and reviewed with patient today in clinic  US guide IA hip CSI last year gave good initial relief, and moderate relief for about 3-4 weeks  Low impact activity options reviewed   PT available anytime as well  Plan to return to Dr Hernandez to review SREE options  Supportive care reviewed  All questions were answered today  Contact us with additional questions or concerns  Signs and sx of concern reviewed      Sukhdev Nunes DO, RENATE  Primary Care Sports Medicine  [facility]               Disclaimer: This note consists of symbols derived from keyboarding, dictation and/or voice recognition software. As a result, there may be errors in the script that have gone undetected. Please consider this when interpreting information found in this chart.    Again, thank you for allowing me to participate in the care of your patient.        Sincerely,        Sukhdev Nunes DO     AVAPRO    90 tablet    Take 1 tablet (300 mg) by mouth daily       lovastatin 40 MG tablet    MEVACOR    90 tablet    Take 1 tablet (40 mg) by mouth At Bedtime       melatonin 3 MG tablet     90 tablet    Take 1 tablet (3 mg) by mouth nightly as needed for sleep       omeprazole 20 MG CR capsule    priLOSEC    180 capsule    Take 1 capsule (20 mg) by mouth daily Profile Rx: patient will contact pharmacy when needed       polyethylene glycol Packet    MIRALAX/GLYCOLAX    7 packet    Take 17 g by mouth 2 times daily as needed for constipation       prednisoLONE acetate 1 % ophthalmic susp    PRED FORTE     Place 1 drop into both eyes 2 times daily       RESTASIS 0.05 % ophthalmic emulsion   Generic drug:  cycloSPORINE      Place 1 drop into both eyes 2 times daily

## 2020-08-27 NOTE — TELEPHONE ENCOUNTER
Pt called back and I gave her the information below to call and schedule an appt .     Pepper Springer, [place]

## 2020-09-10 ENCOUNTER — ANCILLARY PROCEDURE (OUTPATIENT)
Dept: MRI IMAGING | Facility: CLINIC | Age: 65
End: 2020-09-10
Attending: FAMILY MEDICINE
Payer: COMMERCIAL

## 2020-09-10 DIAGNOSIS — Q63.9 RENAL STRUCTURAL ABNORMALITY: ICD-10-CM

## 2020-09-10 PROCEDURE — 74183 MRI ABD W/O CNTR FLWD CNTR: CPT | Mod: TC

## 2020-09-10 PROCEDURE — A9585 GADOBUTROL INJECTION: HCPCS | Mod: JW

## 2020-09-10 RX ORDER — GADOBUTROL 604.72 MG/ML
10 INJECTION INTRAVENOUS ONCE
Status: COMPLETED | OUTPATIENT
Start: 2020-09-10 | End: 2020-09-10

## 2020-09-10 RX ADMIN — GADOBUTROL 8.5 ML: 604.72 INJECTION INTRAVENOUS at 09:33

## 2020-09-16 ENCOUNTER — HOSPITAL ENCOUNTER (OUTPATIENT)
Dept: MAMMOGRAPHY | Facility: CLINIC | Age: 65
Discharge: HOME | End: 2020-09-16
Admitting: FAMILY MEDICINE
Payer: COMMERCIAL

## 2020-09-16 DIAGNOSIS — Z12.31 VISIT FOR SCREENING MAMMOGRAM: ICD-10-CM

## 2020-09-16 PROCEDURE — 77067 SCR MAMMO BI INCL CAD: CPT

## 2020-09-17 DIAGNOSIS — E78.5 HYPERLIPIDEMIA LDL GOAL <130: ICD-10-CM

## 2020-09-17 DIAGNOSIS — R73.09 ABNORMAL GLUCOSE: ICD-10-CM

## 2020-09-17 DIAGNOSIS — E55.9 VITAMIN D DEFICIENCY: ICD-10-CM

## 2020-09-17 DIAGNOSIS — E03.8 SUBCLINICAL HYPOTHYROIDISM: ICD-10-CM

## 2020-09-17 DIAGNOSIS — I10 ESSENTIAL HYPERTENSION WITH GOAL BLOOD PRESSURE LESS THAN 140/90: ICD-10-CM

## 2020-09-17 LAB
ALBUMIN SERPL-MCNC: 4 G/DL (ref 3.4–5)
ALP SERPL-CCNC: 111 U/L (ref 40–150)
ALT SERPL W P-5'-P-CCNC: 23 U/L (ref 0–50)
ANION GAP SERPL CALCULATED.3IONS-SCNC: 8 MMOL/L (ref 3–14)
AST SERPL W P-5'-P-CCNC: 19 U/L (ref 0–45)
BILIRUB SERPL-MCNC: 0.3 MG/DL (ref 0.2–1.3)
BUN SERPL-MCNC: 21 MG/DL (ref 7–30)
CALCIUM SERPL-MCNC: 8.8 MG/DL (ref 8.5–10.1)
CHLORIDE SERPL-SCNC: 107 MMOL/L (ref 94–109)
CHOLEST SERPL-MCNC: 150 MG/DL
CO2 SERPL-SCNC: 25 MMOL/L (ref 20–32)
CREAT SERPL-MCNC: 0.86 MG/DL (ref 0.52–1.04)
GFR SERPL CREATININE-BSD FRML MDRD: 70 ML/MIN/{1.73_M2}
GLUCOSE SERPL-MCNC: 97 MG/DL (ref 70–99)
HBA1C MFR BLD: 5.3 % (ref 0–5.6)
HDLC SERPL-MCNC: 49 MG/DL
LDLC SERPL CALC-MCNC: 82 MG/DL
NONHDLC SERPL-MCNC: 101 MG/DL
POTASSIUM SERPL-SCNC: 4.3 MMOL/L (ref 3.4–5.3)
PROT SERPL-MCNC: 7.1 G/DL (ref 6.8–8.8)
SODIUM SERPL-SCNC: 140 MMOL/L (ref 133–144)
TRIGL SERPL-MCNC: 93 MG/DL
TSH SERPL DL<=0.005 MIU/L-ACNC: 2.98 MU/L (ref 0.4–4)

## 2020-09-17 PROCEDURE — 36415 COLL VENOUS BLD VENIPUNCTURE: CPT | Performed by: FAMILY MEDICINE

## 2020-09-17 PROCEDURE — 83036 HEMOGLOBIN GLYCOSYLATED A1C: CPT | Performed by: FAMILY MEDICINE

## 2020-09-17 PROCEDURE — 80053 COMPREHEN METABOLIC PANEL: CPT | Performed by: FAMILY MEDICINE

## 2020-09-17 PROCEDURE — 80061 LIPID PANEL: CPT | Performed by: FAMILY MEDICINE

## 2020-09-17 PROCEDURE — 84443 ASSAY THYROID STIM HORMONE: CPT | Performed by: FAMILY MEDICINE

## 2020-09-17 PROCEDURE — 82306 VITAMIN D 25 HYDROXY: CPT | Performed by: FAMILY MEDICINE

## 2020-09-18 LAB — DEPRECATED CALCIDIOL+CALCIFEROL SERPL-MC: 20 UG/L (ref 20–75)

## 2020-09-26 DIAGNOSIS — E87.6 HYPOKALEMIA: ICD-10-CM

## 2020-09-26 DIAGNOSIS — E78.5 HYPERLIPIDEMIA LDL GOAL <130: ICD-10-CM

## 2020-09-29 RX ORDER — SIMVASTATIN 40 MG
TABLET ORAL
Qty: 90 TABLET | Refills: 2 | Status: SHIPPED | OUTPATIENT
Start: 2020-09-29 | End: 2021-08-16

## 2020-09-29 RX ORDER — POTASSIUM CHLORIDE 1500 MG/1
TABLET, EXTENDED RELEASE ORAL
Qty: 90 TABLET | Refills: 2 | Status: SHIPPED | OUTPATIENT
Start: 2020-09-29 | End: 2021-03-02

## 2020-10-01 ENCOUNTER — TRANSFERRED RECORDS (OUTPATIENT)
Dept: HEALTH INFORMATION MANAGEMENT | Facility: CLINIC | Age: 65
End: 2020-10-01

## 2021-01-01 DIAGNOSIS — I10 ESSENTIAL HYPERTENSION WITH GOAL BLOOD PRESSURE LESS THAN 140/90: ICD-10-CM

## 2021-01-04 RX ORDER — LISINOPRIL 10 MG/1
TABLET ORAL
Qty: 90 TABLET | Refills: 2 | Status: SHIPPED | OUTPATIENT
Start: 2021-01-04 | End: 2021-08-16

## 2021-01-04 NOTE — TELEPHONE ENCOUNTER
Prescription approved per [facility] Refill Protocol.  Yosi Plummer RN

## 2021-01-15 ENCOUNTER — HEALTH MAINTENANCE LETTER (OUTPATIENT)
Age: 66
End: 2021-01-15

## 2021-03-02 ENCOUNTER — TELEPHONE (OUTPATIENT)
Dept: FAMILY MEDICINE | Facility: CLINIC | Age: 66
End: 2021-03-02

## 2021-03-02 DIAGNOSIS — F32.5 MAJOR DEPRESSION IN COMPLETE REMISSION (H): ICD-10-CM

## 2021-03-02 DIAGNOSIS — E87.6 HYPOKALEMIA: ICD-10-CM

## 2021-03-02 RX ORDER — SERTRALINE HYDROCHLORIDE 100 MG/1
TABLET, FILM COATED ORAL
Qty: 270 TABLET | Refills: 0 | Status: SHIPPED | OUTPATIENT
Start: 2021-03-02 | End: 2021-05-17

## 2021-03-02 RX ORDER — POTASSIUM CHLORIDE 1500 MG/1
20 TABLET, EXTENDED RELEASE ORAL DAILY
Qty: 90 TABLET | Refills: 0 | Status: SHIPPED | OUTPATIENT
Start: 2021-03-02 | End: 2021-05-17

## 2021-03-02 NOTE — CONFIDENTIAL NOTE
Medication is being filled for 1 time refill only due to:  Patient needs to be seen because needs follow up and PHQ9.   Yosi Plummer RN

## 2021-04-16 ENCOUNTER — OFFICE VISIT (OUTPATIENT)
Dept: FAMILY MEDICINE | Facility: CLINIC | Age: 66
End: 2021-04-16
Payer: COMMERCIAL

## 2021-04-16 VITALS
DIASTOLIC BLOOD PRESSURE: 82 MMHG | WEIGHT: 187 LBS | HEART RATE: 73 BPM | BODY MASS INDEX: 33.13 KG/M2 | RESPIRATION RATE: 16 BRPM | HEIGHT: 63 IN | OXYGEN SATURATION: 98 % | SYSTOLIC BLOOD PRESSURE: 142 MMHG | TEMPERATURE: 97.7 F

## 2021-04-16 DIAGNOSIS — F33.2 SEVERE EPISODE OF RECURRENT MAJOR DEPRESSIVE DISORDER, WITHOUT PSYCHOTIC FEATURES (H): Primary | ICD-10-CM

## 2021-04-16 DIAGNOSIS — K52.9 NON-SPECIFIC COLITIS: ICD-10-CM

## 2021-04-16 PROCEDURE — 99214 OFFICE O/P EST MOD 30 MIN: CPT | Performed by: FAMILY MEDICINE

## 2021-04-16 RX ORDER — ARIPIPRAZOLE 2 MG/1
2 TABLET ORAL DAILY
Qty: 30 TABLET | Refills: 0 | Status: SHIPPED | OUTPATIENT
Start: 2021-04-16 | End: 2021-05-10

## 2021-04-16 ASSESSMENT — ANXIETY QUESTIONNAIRES
GAD7 TOTAL SCORE: 18
5. BEING SO RESTLESS THAT IT IS HARD TO SIT STILL: NOT AT ALL
2. NOT BEING ABLE TO STOP OR CONTROL WORRYING: NEARLY EVERY DAY
IF YOU CHECKED OFF ANY PROBLEMS ON THIS QUESTIONNAIRE, HOW DIFFICULT HAVE THESE PROBLEMS MADE IT FOR YOU TO DO YOUR WORK, TAKE CARE OF THINGS AT HOME, OR GET ALONG WITH OTHER PEOPLE: VERY DIFFICULT
7. FEELING AFRAID AS IF SOMETHING AWFUL MIGHT HAPPEN: NEARLY EVERY DAY
3. WORRYING TOO MUCH ABOUT DIFFERENT THINGS: NEARLY EVERY DAY
6. BECOMING EASILY ANNOYED OR IRRITABLE: NEARLY EVERY DAY
1. FEELING NERVOUS, ANXIOUS, OR ON EDGE: NEARLY EVERY DAY

## 2021-04-16 ASSESSMENT — MIFFLIN-ST. JEOR: SCORE: 1348.39

## 2021-04-16 ASSESSMENT — PATIENT HEALTH QUESTIONNAIRE - PHQ9
5. POOR APPETITE OR OVEREATING: NEARLY EVERY DAY
SUM OF ALL RESPONSES TO PHQ QUESTIONS 1-9: 26

## 2021-04-16 NOTE — H&P (VIEW-ONLY)
A/P:      ICD-10-CM    1. Severe episode of recurrent major depressive disorder, without psychotic features (H)  F33.2 MENTAL HEALTH REFERRAL  - Adult; Outpatient Treatment; Individual/Couples/Family/Group Therapy/Health Psychology; [facility] [place]: [facility] [phone]; We will contact you to schedule the appointment or please call with any questions     ARIPiprazole (ABILIFY) 2 MG tablet   2. Non-specific colitis  K52.9 GASTROENTEROLOGY ADULT REF PROCEDURE ONLY     Depression:  Discussed options of switching med (has only ever been on sertraline) vs adding wellbutrin vs alternate.  Pt very reluctant to switch sertraline, feels it ahs worked very well for her.  Feels she has taken well butrin in the past for a few months but did not help her at all.  Has heard commercials about other avaiable agents, would like to add one of them.  Reviewed risks and side effects of abilify, plan as below    Patient Instructions   For your mood:  We decided to continue the sertraline for now at the current dose and to add a small dose of a medication called abilify.  The most common side effect of this medicine is restlessness.  Please let me know if this is a problem for you.  I would like you to message me in 2 weeks with an update on how you are doing and we should have a formal telephone visit again in 4 weeks (before your refill is needed)  I also think it is really important to get in with a therapist.  I placed a new referral and you should get a call to schedule in 1-2 days.      For your bowel:  Very important to get your colonoscopy taken care of.  Please schedule this as soon as you are able.  You can request your work send me [facility] paperwork to complete to cover time off work you might miss due to your bowel concerns.      For your hip:  Please get in with you surgeon to discuss.                Dami Funez is a 65 year old who presents for the following health issues   HPI     Depression  Followup    How are you doing with your depression since your last visit? Worsened     Are you having other symptoms that might be associated with depression? Yes:  stress, fatigue and anxious    Have you had a significant life event?  Job Concerns     Are you feeling anxious or having panic attacks?   Yes:  anxious    Do you have any concerns with your use of alcohol or other drugs? Yes:  pt. states she has been drinking to much.     Social History     Tobacco Use     Smoking status: Current Every Day Smoker     Packs/day: 0.50     Years: 35.00     Pack years: 17.50     Types: Cigarettes     Smokeless tobacco: Never Used   Substance Use Topics     Alcohol use: Yes     Comment: weekends drinking more 3-5 drinks     Drug use: No     PHQ 8/14/2020 3/7/2021 4/16/2021   PHQ-9 Total Score 7 9 26   Q9: Thoughts of better off dead/self-harm past 2 weeks Not at all Several days More than half the days   F/U: Thoughts of suicide or self-harm - No -   F/U: Self harm-plan - - -   F/U: Self-harm action - - -   F/U: Safety concerns - No -     TAMIKO-7 SCORE 8/14/2020 3/7/2021 4/16/2021   Total Score - 5 (mild anxiety) -   Total Score 6 5 18     Last PHQ-9 4/16/2021   1.  Little interest or pleasure in doing things 3   2.  Feeling down, depressed, or hopeless 3   3.  Trouble falling or staying asleep, or sleeping too much 3   4.  Feeling tired or having little energy 3   5.  Poor appetite or overeating 3   6.  Feeling bad about yourself 3   7.  Trouble concentrating 3   8.  Moving slowly or restless 3   Q9: Thoughts of better off dead/self-harm past 2 weeks 2   PHQ-9 Total Score 26   Difficulty at work, home, or with people Very difficult   In the past two weeks have you had thoughts of suicide or self harm? -   Do you have concerns about your personal safety or the safety of others? -   In the past 2 weeks have you thought about a plan or had intention to harm yourself? -   In the past 2 weeks have you acted on these thoughts in  "any way? -     TAMIKO-7  4/16/2021   1. Feeling nervous, anxious, or on edge 3   2. Not being able to stop or control worrying 3   3. Worrying too much about different things 3   4. Trouble relaxing 3   5. Being so restless that it is hard to sit still 0   6. Becoming easily annoyed or irritable 3   7. Feeling afraid, as if something awful might happen 3   TAMIKO-7 Total Score 18   If you checked any problems, how difficult have they made it for you to do your work, take care of things at home, or get along with other people? Very difficult       Suicide Assessment Five-step Evaluation and Treatment (SAFE-T)      How many servings of fruits and vegetables do you eat daily?  0-1    On average, how many sweetened beverages do you drink each day (Examples: soda, juice, sweet tea, etc.  Do NOT count diet or artificially sweetened beverages)?   4 weekly with drinks    How many days per week do you exercise enough to make your heart beat faster? none    How many minutes a day do you exercise enough to make your heart beat faster? none    How many days per week do you miss taking your medication? 0    Feels like mood currently is as bad as it has been.  Started med in 2006 with issues with job stress  Need up on 300mg sertraline eventually  Has never been on other medication for her mood.    Feels she has been dong \"terrible\".  Feels very unhappy at her job and like she is not doing a good job there.    Feels she is in a \"hoarder\" situation.  Feels depressed about her situation and that she is not really doing anything about it.    Concern over her job has really impacted her.  Income is very important to her and feels she is \"screwing it up\".  Had 3 cats and now is down to just one and feels guilty because cat has upper respiratory issue that she feels are related to her smoking.      Feels like she has alienated family members due to behavior with drinking.  Feels family members are very lewis and judgmental.    Has been " "drinking more recently.  Drinks vodka as first choice.  Drinks 3-4 days per week.  Usually \"1 drink\" at home and is at least a double.  Then drinks 2 double shots when out once per week with her sister.    Feels like she has had issue with EtOH in the past, never in treatment before.  Has had periods of sobriety but not recently.  Does not drink on a daily basis but does feel it is an issue.    Denies any active suicidal ideation.  States she would never harm herself and feels she is safe.  Does talk with one of ehr siscters but feel like she does not want to \"burden\" her.    *  Missed 2 days of work last week due to hip/back pain.     Feels she needs to get this addressed.  States she has been putting off her hip replacement due to not wanting to miss work but now really needs to get it done    *  Last Thursday had abd pain and a change in her bowel habits.  Feels this has settled down and is back to normal.  Had an episode of colitis last year for which she was in the ED.  Feels she has similar symptoms now that flare periodically.    At times has fecal urgency associated with this.  Issue is that there is not a ladies room on her floor at work.  Feels she missed work for this a couple of days every 2-3 months.  Would like to have FMLA to cover.  Never followed up with recommended colonoscopy after the wellness visit to further evaluate this colitis episode.       Review of Systems   Constitutional, HEENT, cardiovascular, pulmonary, gi and gu systems are negative, except as otherwise noted.      Objective    BP (!) 142/82   Pulse 73   Temp 97.7  F (36.5  C) (Tympanic)   Resp 16   Ht 1.594 m (5' 2.75\")   Wt 84.8 kg (187 lb)   SpO2 98%   BMI 33.39 kg/m    Body mass index is 33.39 kg/m .  Physical Exam   PE:  VS as above   Gen:  WN/WD/WH female in NAD   Psych: Alert and oriented times 3; coherent speech, normal   rate and volume, able to articulate logical thoughts, able   to abstract reason, no tangential " thoughts, no hallucinations   or delusions  Her affect is depressed        Epic reviewed

## 2021-04-16 NOTE — PATIENT INSTRUCTIONS
For your mood:  We decided to continue the sertraline for now at the current dose and to add a small dose of a medication called abilify.  The most common side effect of this medicine is restlessness.  Please let me know if this is a problem for you.  I would like you to message me in 2 weeks with an update on how you are doing and we should have a formal telephone visit again in 4 weeks (before your refill is needed)  I also think it is really important to get in with a therapist.  I placed a new referral and you should get a call to schedule in 1-2 days.      For your bowel:  Very important to get your colonoscopy taken care of.  Please schedule this as soon as you are able.  You can request your work send me FMLA paperwork to complete to cover time off work you might miss due to your bowel concerns.      For your hip:  Please get in with you surgeon to discuss.

## 2021-04-17 ASSESSMENT — ANXIETY QUESTIONNAIRES: GAD7 TOTAL SCORE: 18

## 2021-04-20 DIAGNOSIS — Z11.59 ENCOUNTER FOR SCREENING FOR OTHER VIRAL DISEASES: ICD-10-CM

## 2021-04-29 ENCOUNTER — MYC MEDICAL ADVICE (OUTPATIENT)
Dept: FAMILY MEDICINE | Facility: CLINIC | Age: 66
End: 2021-04-29

## 2021-04-29 ENCOUNTER — ANESTHESIA EVENT (OUTPATIENT)
Dept: GASTROENTEROLOGY | Facility: CLINIC | Age: 66
End: 2021-04-29
Payer: COMMERCIAL

## 2021-04-29 ASSESSMENT — LIFESTYLE VARIABLES: TOBACCO_USE: 1

## 2021-04-29 NOTE — ANESTHESIA PREPROCEDURE EVALUATION
Anesthesia Pre-Procedure Evaluation    Patient: Dee Dee Shepherd   MRN: [id number] : 1955        Preoperative Diagnosis: Non-specific colitis [K52.9]   Procedure : Procedure(s):  COLONOSCOPY     Past Medical History:   Diagnosis Date     Anxiety      Chronic low back pain      Depressive disorder      Hyperlipidemia      Hypertension      Malignant neoplasm of breast (female) (H)     right     Pap smear abnormality of cervix with cytologic evidence of malignancy      Tobacco abuse       Past Surgical History:   Procedure Laterality Date     C LIGATE FALLOPIAN TUBE       MASTECTOMY        No Known Allergies   Social History     Tobacco Use     Smoking status: Current Every Day Smoker     Packs/day: 0.50     Years: 35.00     Pack years: 17.50     Types: Cigarettes     Smokeless tobacco: Never Used   Substance Use Topics     Alcohol use: Yes     Comment: weekends drinking more 3-5 drinks      Wt Readings from Last 1 Encounters:   21 84.8 kg (187 lb)        Anesthesia Evaluation   Pt has had prior anesthetic. Type: General and MAC.        ROS/MED HX  ENT/Pulmonary:     (+) tobacco use, Past use,     Neurologic:       Cardiovascular:     (+) Dyslipidemia hypertension-----    METS/Exercise Tolerance:     Hematologic:  - neg hematologic  ROS     Musculoskeletal:       GI/Hepatic:  - neg GI/hepatic ROS     Renal/Genitourinary:  - neg Renal ROS     Endo:  - neg endo ROS     Psychiatric/Substance Use:     (+) psychiatric history depression and anxiety     Infectious Disease:  - neg infectious disease ROS     Malignancy:   (+) Malignancy, History of Breast.    Other:      (+) , H/O Chronic Pain,        Physical Exam    Airway        Mallampati: II   TM distance: > 3 FB   Neck ROM: full   Mouth opening: > 3 cm    Respiratory Devices and Support         Dental  no notable dental history         Cardiovascular   cardiovascular exam normal          Pulmonary   pulmonary exam normal                OUTSIDE  LABS:  CBC:   Lab Results   Component Value Date    WBC 13.6 (H) 03/26/2020    HGB 15.1 03/26/2020    HCT 44.3 03/26/2020     03/26/2020     BMP:   Lab Results   Component Value Date     09/17/2020     03/26/2020    POTASSIUM 4.3 09/17/2020    POTASSIUM 3.6 03/26/2020    CHLORIDE 107 09/17/2020    CHLORIDE 109 03/26/2020    CO2 25 09/17/2020    CO2 24 03/26/2020    BUN 21 09/17/2020    BUN 16 03/26/2020    CR 0.86 09/17/2020    CR 0.74 03/26/2020    GLC 97 09/17/2020     (H) 03/26/2020     COAGS: No results found for: PTT, INR, FIBR  POC: No results found for: BGM, HCG, HCGS  HEPATIC:   Lab Results   Component Value Date    ALBUMIN 4.0 09/17/2020    PROTTOTAL 7.1 09/17/2020    ALT 23 09/17/2020    AST 19 09/17/2020    ALKPHOS 111 09/17/2020    BILITOTAL 0.3 09/17/2020     OTHER:   Lab Results   Component Value Date    A1C 5.3 09/17/2020    NESTOR 8.8 09/17/2020    PHOS 3.2 12/05/2019    LIPASE 42 (L) 03/26/2020    TSH 2.98 09/17/2020    T4 0.89 05/17/2019       Anesthesia Plan    ASA Status:  2      Anesthesia Type: MAC.   Induction: Intravenous.           Consents    Anesthesia Plan(s) and associated risks, benefits, and realistic alternatives discussed. Questions answered and patient/representative(s) expressed understanding.     - Discussed with:  Patient         Postoperative Care            Comments:                Jo Ann Casas, CRNA, APRN CRNA

## 2021-04-30 DIAGNOSIS — Z11.59 ENCOUNTER FOR SCREENING FOR OTHER VIRAL DISEASES: ICD-10-CM

## 2021-04-30 LAB
SARS-COV-2 RNA RESP QL NAA+PROBE: NORMAL
SPECIMEN SOURCE: NORMAL

## 2021-04-30 PROCEDURE — U0005 INFEC AGEN DETEC AMPLI PROBE: HCPCS | Performed by: SURGERY

## 2021-04-30 PROCEDURE — U0003 INFECTIOUS AGENT DETECTION BY NUCLEIC ACID (DNA OR RNA); SEVERE ACUTE RESPIRATORY SYNDROME CORONAVIRUS 2 (SARS-COV-2) (CORONAVIRUS DISEASE [COVID-19]), AMPLIFIED PROBE TECHNIQUE, MAKING USE OF HIGH THROUGHPUT TECHNOLOGIES AS DESCRIBED BY CMS-2020-01-R: HCPCS | Performed by: SURGERY

## 2021-05-01 LAB
LABORATORY COMMENT REPORT: NORMAL
SARS-COV-2 RNA RESP QL NAA+PROBE: NEGATIVE
SPECIMEN SOURCE: NORMAL

## 2021-05-03 ENCOUNTER — HOSPITAL ENCOUNTER (OUTPATIENT)
Facility: CLINIC | Age: 66
Discharge: HOME | End: 2021-05-03
Attending: SURGERY | Admitting: SURGERY
Payer: COMMERCIAL

## 2021-05-03 ENCOUNTER — ANESTHESIA (OUTPATIENT)
Dept: GASTROENTEROLOGY | Facility: CLINIC | Age: 66
End: 2021-05-03
Payer: COMMERCIAL

## 2021-05-03 VITALS
HEART RATE: 67 BPM | TEMPERATURE: 98.3 F | HEIGHT: 63 IN | BODY MASS INDEX: 33.13 KG/M2 | DIASTOLIC BLOOD PRESSURE: 69 MMHG | WEIGHT: 187 LBS | SYSTOLIC BLOOD PRESSURE: 149 MMHG | OXYGEN SATURATION: 97 %

## 2021-05-03 LAB — COLONOSCOPY: NORMAL

## 2021-05-03 PROCEDURE — 370N000017 HC ANESTHESIA TECHNICAL FEE, PER MIN: Performed by: SURGERY

## 2021-05-03 PROCEDURE — 258N000003 HC RX IP 258 OP 636: Performed by: SURGERY

## 2021-05-03 PROCEDURE — 45385 COLONOSCOPY W/LESION REMOVAL: CPT | Performed by: SURGERY

## 2021-05-03 PROCEDURE — 250N000011 HC RX IP 250 OP 636: Performed by: NURSE ANESTHETIST, CERTIFIED REGISTERED

## 2021-05-03 PROCEDURE — 45380 COLONOSCOPY AND BIOPSY: CPT | Mod: 59 | Performed by: SURGERY

## 2021-05-03 PROCEDURE — 45380 COLONOSCOPY AND BIOPSY: CPT | Mod: XU | Performed by: SURGERY

## 2021-05-03 PROCEDURE — 250N000009 HC RX 250: Performed by: SURGERY

## 2021-05-03 PROCEDURE — 88305 TISSUE EXAM BY PATHOLOGIST: CPT | Mod: 26 | Performed by: PATHOLOGY

## 2021-05-03 PROCEDURE — 250N000009 HC RX 250: Performed by: NURSE ANESTHETIST, CERTIFIED REGISTERED

## 2021-05-03 PROCEDURE — 88305 TISSUE EXAM BY PATHOLOGIST: CPT | Mod: TC | Performed by: SURGERY

## 2021-05-03 RX ORDER — LIDOCAINE 40 MG/G
CREAM TOPICAL
Status: DISCONTINUED | OUTPATIENT
Start: 2021-05-03 | End: 2021-05-03 | Stop reason: HOSPADM

## 2021-05-03 RX ORDER — SODIUM CHLORIDE, SODIUM LACTATE, POTASSIUM CHLORIDE, CALCIUM CHLORIDE 600; 310; 30; 20 MG/100ML; MG/100ML; MG/100ML; MG/100ML
INJECTION, SOLUTION INTRAVENOUS CONTINUOUS
Status: DISCONTINUED | OUTPATIENT
Start: 2021-05-03 | End: 2021-05-03 | Stop reason: HOSPADM

## 2021-05-03 RX ORDER — PROPOFOL 10 MG/ML
INJECTION, EMULSION INTRAVENOUS PRN
Status: DISCONTINUED | OUTPATIENT
Start: 2021-05-03 | End: 2021-05-03

## 2021-05-03 RX ORDER — PROPOFOL 10 MG/ML
INJECTION, EMULSION INTRAVENOUS CONTINUOUS PRN
Status: DISCONTINUED | OUTPATIENT
Start: 2021-05-03 | End: 2021-05-03

## 2021-05-03 RX ADMIN — PROPOFOL 50 MG: 10 INJECTION, EMULSION INTRAVENOUS at 13:17

## 2021-05-03 RX ADMIN — SODIUM CHLORIDE, POTASSIUM CHLORIDE, SODIUM LACTATE AND CALCIUM CHLORIDE: 600; 310; 30; 20 INJECTION, SOLUTION INTRAVENOUS at 13:11

## 2021-05-03 RX ADMIN — LIDOCAINE HYDROCHLORIDE 0.1 ML: 10 INJECTION, SOLUTION EPIDURAL; INFILTRATION; INTRACAUDAL; PERINEURAL at 13:10

## 2021-05-03 RX ADMIN — PROPOFOL 200 MCG/KG/MIN: 10 INJECTION, EMULSION INTRAVENOUS at 13:17

## 2021-05-03 ASSESSMENT — MIFFLIN-ST. JEOR: SCORE: 1348.39

## 2021-05-03 NOTE — ANESTHESIA CARE TRANSFER NOTE
Patient: Dee Dee Shepherd    Procedure(s):  COLONOSCOPY, FLEXIBLE, WITH LESION REMOVAL USING SNARE  Colonoscopy, With Polypectomy And Biopsy    Diagnosis: Non-specific colitis [K52.9]  Diagnosis Additional Information: No value filed.    Anesthesia Type:   MAC     Note:      Level of Consciousness: awake          Vital Signs Stable: post-procedure vital signs reviewed and stable  Report to RN Given: handoff report given  Patient transferred to: Phase II    Handoff Report: Identifed the Patient, Identified the Reponsible Provider, Reviewed the pertinent medical history, Discussed the surgical course, Reviewed Intra-OP anesthesia mangement and issues during anesthesia, Set expectations for post-procedure period and Allowed opportunity for questions and acknowledgement of understanding      Vitals: (Last set prior to Anesthesia Care Transfer)  CRNA VITALS  5/3/2021 1310 - 5/3/2021 1340      5/3/2021             Pulse:  75    Ht Rate:  71    SpO2:  99 %        Electronically Signed By: DELORES Haywood CRNA  May 3, 2021  1:40 PM

## 2021-05-03 NOTE — INTERVAL H&P NOTE
The History and Physical has been reviewed, the patient has been examined and no changes have occurred in the patient's condition since the H & P was completed.     last colonoscopy 2013 - normal at the time.  Need colonoscopy for colitis    [facility], DO

## 2021-05-03 NOTE — ANESTHESIA POSTPROCEDURE EVALUATION
Patient: Dee Dee Shepherd    Procedure(s):  COLONOSCOPY, FLEXIBLE, WITH LESION REMOVAL USING SNARE  Colonoscopy, With Polypectomy And Biopsy    Diagnosis:Non-specific colitis [K52.9]  Diagnosis Additional Information: No value filed.    Anesthesia Type:  MAC    Note:  Disposition: Outpatient   Postop Pain Control: Uneventful            Sign Out: Well controlled pain   PONV: No   Neuro/Psych: Uneventful            Sign Out: Acceptable/Baseline neuro status   Airway/Respiratory: Uneventful            Sign Out: Acceptable/Baseline resp. status   CV/Hemodynamics: Uneventful            Sign Out: Acceptable CV status; No obvious hypovolemia; No obvious fluid overload   Other NRE: NONE   DID A NON-ROUTINE EVENT OCCUR? No           Last vitals:  Vitals:    05/03/21 1244   BP: (!) 148/107   Pulse: 83   Temp: 36.8  C (98.3  F)   SpO2: 97%       Last vitals prior to Anesthesia Care Transfer:  CRNA VITALS  5/3/2021 1310 - 5/3/2021 1340      5/3/2021             Pulse:  75    Ht Rate:  71    SpO2:  99 %          Electronically Signed By: DELORES Haywood CRNA  May 3, 2021  1:40 PM

## 2021-05-03 NOTE — LETTER
Dee Dee Shepherd  [address]  [place] [address]      May 6, 2021    Dear Dee Dee,  This letter is written to inform you of the results of your recent colonoscopy.  Your examination showed polyp(s) in your transverse colon and rectum. All polyps were removed in their entirety and sent for review by a pathologist. As you will see on the pathology report below, the tissue(s) were hyperplastic polyps and some inflamed tissue consistent with your inflammation back in march.  Your examination also showed abnormal area just above the dentate line - this was biopsied and showed this was normal tissue.    SPECIMEN(S):   A: Colon polyp, transverse   B: Rectal polyp   C: Rectal biopsy     FINAL DIAGNOSIS:   A.  Transverse colon, mucosal biopsy:   - Benign colonic mucosa with nonspecific mild increase in chronic   inflammation in lamina propria.     B.  Rectum, mucosal biopsy:   - A minute hyperplastic polyp and normal colonic mucosa.'   - Negative for dysplasia and malignancy.     C.  Rectum, dentate line, mucosal biopsy:   - Normal colonic mucosa and a minute amount of normal nonkeratinizing   squamous epithelium.       Hyperplastic polyps are entirely benign (non-cancerous) and rarely associated with the development of additional polyps or colorectal cancer.    Given these findings, I recommend that you undergo a repeat colonoscopy in ten years for screening. We will enter you into a recall system so you receive a reminder closer to the time that you are due for repeat examination.     Please remember that this recommendation is made with the understanding that you are not experiencing persistent changes in bowel function, bleeding per rectum, and/or significant abdominal pain. If you experience these symptoms, please contact your primary care provider for a further evaluation.     If you have any questions or concerns about the results of your colonoscopy or the appropriate follow-up, please contact my  assistant at [phone]    Sincerely,        [facility], DO [facility]  ___

## 2021-05-06 LAB — COPATH REPORT: NORMAL

## 2021-05-08 DIAGNOSIS — F33.2 SEVERE EPISODE OF RECURRENT MAJOR DEPRESSIVE DISORDER, WITHOUT PSYCHOTIC FEATURES (H): ICD-10-CM

## 2021-05-10 RX ORDER — ARIPIPRAZOLE 2 MG/1
TABLET ORAL
Qty: 30 TABLET | Refills: 0 | Status: SHIPPED | OUTPATIENT
Start: 2021-05-10 | End: 2021-05-17

## 2021-05-10 NOTE — TELEPHONE ENCOUNTER
Routing refill request to provider for review/approval because:  Failed BP parameters  Jennifer Mcfarland RN

## 2021-05-17 ENCOUNTER — VIRTUAL VISIT (OUTPATIENT)
Dept: FAMILY MEDICINE | Facility: CLINIC | Age: 66
End: 2021-05-17
Payer: COMMERCIAL

## 2021-05-17 DIAGNOSIS — F33.2 SEVERE EPISODE OF RECURRENT MAJOR DEPRESSIVE DISORDER, WITHOUT PSYCHOTIC FEATURES (H): ICD-10-CM

## 2021-05-17 DIAGNOSIS — E87.6 HYPOKALEMIA: ICD-10-CM

## 2021-05-17 PROCEDURE — 99213 OFFICE O/P EST LOW 20 MIN: CPT | Mod: TEL | Performed by: FAMILY MEDICINE

## 2021-05-17 RX ORDER — ARIPIPRAZOLE 2 MG/1
2 TABLET ORAL DAILY
Qty: 90 TABLET | Refills: 0 | Status: SHIPPED | OUTPATIENT
Start: 2021-05-17 | End: 2021-09-01

## 2021-05-17 RX ORDER — POTASSIUM CHLORIDE 1500 MG/1
20 TABLET, EXTENDED RELEASE ORAL DAILY
Qty: 90 TABLET | Refills: 0 | Status: SHIPPED | OUTPATIENT
Start: 2021-05-17 | End: 2021-11-10

## 2021-05-17 RX ORDER — SERTRALINE HYDROCHLORIDE 100 MG/1
TABLET, FILM COATED ORAL
Qty: 270 TABLET | Refills: 0 | Status: SHIPPED | OUTPATIENT
Start: 2021-05-17 | End: 2021-11-10

## 2021-05-17 ASSESSMENT — ANXIETY QUESTIONNAIRES
IF YOU CHECKED OFF ANY PROBLEMS ON THIS QUESTIONNAIRE, HOW DIFFICULT HAVE THESE PROBLEMS MADE IT FOR YOU TO DO YOUR WORK, TAKE CARE OF THINGS AT HOME, OR GET ALONG WITH OTHER PEOPLE: VERY DIFFICULT
1. FEELING NERVOUS, ANXIOUS, OR ON EDGE: MORE THAN HALF THE DAYS
7. FEELING AFRAID AS IF SOMETHING AWFUL MIGHT HAPPEN: NEARLY EVERY DAY
2. NOT BEING ABLE TO STOP OR CONTROL WORRYING: NEARLY EVERY DAY
5. BEING SO RESTLESS THAT IT IS HARD TO SIT STILL: NOT AT ALL
6. BECOMING EASILY ANNOYED OR IRRITABLE: NOT AT ALL
GAD7 TOTAL SCORE: 11
3. WORRYING TOO MUCH ABOUT DIFFERENT THINGS: NEARLY EVERY DAY

## 2021-05-17 ASSESSMENT — PATIENT HEALTH QUESTIONNAIRE - PHQ9
SUM OF ALL RESPONSES TO PHQ QUESTIONS 1-9: 7
5. POOR APPETITE OR OVEREATING: NOT AT ALL

## 2021-05-17 NOTE — PROGRESS NOTES
Dee Dee is a 65 year old who is being evaluated via a billable telephone visit.      What phone number would you like to be contacted at? [phone]  How would you like to obtain your AVS? MyChart    3:18 PM    A/P:      ICD-10-CM    1. Severe episode of recurrent major depressive disorder, without psychotic features (H)  F33.2 ARIPiprazole (ABILIFY) 2 MG tablet   2. Hypokalemia  E87.6 potassium chloride ER (KLOR-CON M) 20 MEQ CR tablet     Mood much improved with addition of abilify.  Tolerating well.  Still some situational anxiety and insomnia.  Planning on scheduling therapy to address.    Will continue current medication plan as long as things continue to improve.  F/u 3 months, sooner if needed.      Subjective   Dee Dee is a 65 year old who presents for the following health issues     HPI     Depression Followup    How are you doing with your depression since your last visit? Improved     Are you having other symptoms that might be associated with depression? No    Have you had a significant life event?  No     Are you feeling anxious or having panic attacks?   Yes:  anxious    Do you have any concerns with your use of alcohol or other drugs? No    Social History     Tobacco Use     Smoking status: Current Every Day Smoker     Packs/day: 0.50     Years: 35.00     Pack years: 17.50     Types: Cigarettes     Smokeless tobacco: Never Used   Substance Use Topics     Alcohol use: Yes     Comment: weekends drinking more 3-5 drinks     Drug use: No     PHQ 3/7/2021 4/16/2021 5/17/2021   PHQ-9 Total Score 9 26 7   Q9: Thoughts of better off dead/self-harm past 2 weeks Several days More than half the days Several days   F/U: Thoughts of suicide or self-harm No - -   F/U: Self harm-plan - - -   F/U: Self-harm action - - -   F/U: Safety concerns No - -     TAMIKO-7 SCORE 3/7/2021 4/16/2021 5/17/2021   Total Score 5 (mild anxiety) - -   Total Score 5 18 11         Suicide Assessment Five-step Evaluation and Treatment  "(SAFE-T)      How many servings of fruits and vegetables do you eat daily?  0-1    On average, how many sweetened beverages do you drink each day (Examples: soda, juice, sweet tea, etc.  Do NOT count diet or artificially sweetened beverages)?   0-1    How many days per week do you exercise enough to make your heart beat faster? 3 or less    How many minutes a day do you exercise enough to make your heart beat faster? 9 or less    How many days per week do you miss taking your medication? 0    Feels like she is doing okay.  Feels the abilify is a \"nice addition\".  States that she has been feeling \"better\"  Feeling a bit more in control.  No side effects noted.  Might have fleeting thoughts of suicide when she is very angry or alone feeling.  Does not last, feels she is safe.  Feels that those feelings have been less often, helped by the new medication.    Has not yet gotten into therapy, working on getting a hold of them.  Plans on getting that scheduled later this week.  Feels she is \"not sleeping the best\"  Wakes at 3:30 every morning.  Usually goes to sleep around 11PM.  Generally able to go back to sleep, sometimes easily, sometimes takes longer.  Sometimes just gets up.  Has been ongoing for about 6 months.  No recent change with the change in medication    Has cut back on her EtOH use.  Drinks when going out with her sister once weekly and then maybe 1-2 drinks at other times during the week.  Does not feel this is an issue any longer.     Still anxious but feels this is related to her financial and work situation.  Planning on discussing management of her anxiety with the therapist.       Just got her colonoscopy done and working on getting her hip replacement scheduled.    Review of Systems   Constitutional, HEENT, cardiovascular, pulmonary, gi and gu systems are negative, except as otherwise noted.      Objective           Vitals:  No vitals were obtained today due to virtual visit.    Physical Exam "   healthy, alert and no distress  PSYCH: Alert and oriented times 3; coherent speech, normal   rate and volume, able to articulate logical thoughts, able   to abstract reason, no tangential thoughts, no hallucinations   or delusions  Her affect is normal  RESP: No cough, no audible wheezing, able to talk in full sentences  Remainder of exam unable to be completed due to telephone visits    Epic reviewed        3:30 PM      Phone call duration: 12 minutes

## 2021-05-18 ASSESSMENT — ANXIETY QUESTIONNAIRES: GAD7 TOTAL SCORE: 11

## 2021-07-29 ENCOUNTER — TELEPHONE (OUTPATIENT)
Dept: FAMILY MEDICINE | Facility: CLINIC | Age: 66
End: 2021-07-29

## 2021-07-29 NOTE — TELEPHONE ENCOUNTER
Panel Management Review      Patient has the following on her problem list:     Depression / Dysthymia review    Measure:  Needs PHQ-9 score of 4 or less during index window.  Administer PHQ-9 and if score is 5 or more, send encounter to provider for next steps.    5 - 7 month window range:     PHQ-9 SCORE 3/7/2021 4/16/2021 5/17/2021   PHQ-9 Total Score MyChart 9 (Mild depression) - -   PHQ-9 Total Score 9 26 7       If PHQ-9 recheck is 5 or more, route to provider for next steps.    Patient is due for:  None    Hypertension   Last three blood pressure readings:  BP Readings from Last 3 Encounters:   05/03/21 (!) 149/69   04/16/21 (!) 142/82   08/21/20 121/81     Blood pressure: FAILED    HTN Guidelines:  Less than 140/90      Composite cancer screening  Chart review shows that this patient is due/due soon for the following None  Summary:    Patient is due/failing the following:   Medicare Wellness, pneumovax and BP CHECK    Action needed:   Patient needs office visit for medicare annual wellness.    Type of outreach:    Sent [employer] message.    Questions for provider review:    None                                                                                                                                    Karlene Henriquez [facility]       Chart routed to none .

## 2021-08-16 DIAGNOSIS — I10 ESSENTIAL HYPERTENSION WITH GOAL BLOOD PRESSURE LESS THAN 140/90: ICD-10-CM

## 2021-08-16 DIAGNOSIS — E78.5 HYPERLIPIDEMIA LDL GOAL <130: ICD-10-CM

## 2021-08-16 RX ORDER — SIMVASTATIN 40 MG
TABLET ORAL
Qty: 90 TABLET | Refills: 0 | Status: SHIPPED | OUTPATIENT
Start: 2021-08-16 | End: 2021-11-11

## 2021-08-16 RX ORDER — LISINOPRIL 10 MG/1
10 TABLET ORAL DAILY
Qty: 90 TABLET | Refills: 0 | Status: SHIPPED | OUTPATIENT
Start: 2021-08-16 | End: 2021-11-11

## 2021-08-18 ENCOUNTER — HOSPITAL ENCOUNTER (OUTPATIENT)
Facility: CLINIC | Age: 66
End: 2021-08-18
Attending: ORTHOPAEDIC SURGERY | Admitting: ORTHOPAEDIC SURGERY
Payer: COMMERCIAL

## 2021-08-24 DIAGNOSIS — Z11.59 ENCOUNTER FOR SCREENING FOR OTHER VIRAL DISEASES: ICD-10-CM

## 2021-09-04 ENCOUNTER — HEALTH MAINTENANCE LETTER (OUTPATIENT)
Age: 66
End: 2021-09-04

## 2021-09-13 RX ORDER — TRANEXAMIC ACID 650 MG/1
1950 TABLET ORAL ONCE
Status: CANCELLED | OUTPATIENT
Start: 2021-09-13 | End: 2021-09-13

## 2021-09-13 RX ORDER — CEFAZOLIN SODIUM 2 G/100ML
2 INJECTION, SOLUTION INTRAVENOUS SEE ADMIN INSTRUCTIONS
Status: CANCELLED | OUTPATIENT
Start: 2021-09-13

## 2021-09-13 RX ORDER — CEFAZOLIN SODIUM 2 G/100ML
2 INJECTION, SOLUTION INTRAVENOUS
Status: CANCELLED | OUTPATIENT
Start: 2021-09-13

## 2021-10-30 ENCOUNTER — HEALTH MAINTENANCE LETTER (OUTPATIENT)
Age: 66
End: 2021-10-30

## 2021-11-10 ENCOUNTER — MYC REFILL (OUTPATIENT)
Dept: FAMILY MEDICINE | Facility: CLINIC | Age: 66
End: 2021-11-10
Payer: COMMERCIAL

## 2021-11-10 DIAGNOSIS — I10 ESSENTIAL HYPERTENSION WITH GOAL BLOOD PRESSURE LESS THAN 140/90: ICD-10-CM

## 2021-11-10 DIAGNOSIS — E87.6 HYPOKALEMIA: ICD-10-CM

## 2021-11-10 DIAGNOSIS — F33.2 SEVERE EPISODE OF RECURRENT MAJOR DEPRESSIVE DISORDER, WITHOUT PSYCHOTIC FEATURES (H): ICD-10-CM

## 2021-11-10 DIAGNOSIS — E78.5 HYPERLIPIDEMIA LDL GOAL <130: ICD-10-CM

## 2021-11-10 NOTE — TELEPHONE ENCOUNTER
Refill requests came in by [facility]. Note sent by [employer] asking her to get fasting lab and to fill out the phq9 and eliceo 7.  Yosi Plummer RN

## 2021-11-10 NOTE — TELEPHONE ENCOUNTER
Routing refill request to provider for review/approval because:  Patient needs to be seen because:  Due for annual exam  Last recorded blood pressure does not meet RN protocol parameters  Lipids > year    Jona Bernard RN

## 2021-11-11 RX ORDER — LISINOPRIL 10 MG/1
TABLET ORAL
Qty: 90 TABLET | Refills: 0 | Status: SHIPPED | OUTPATIENT
Start: 2021-11-11 | End: 2021-11-16

## 2021-11-11 RX ORDER — SIMVASTATIN 40 MG
TABLET ORAL
Qty: 90 TABLET | Refills: 0 | Status: SHIPPED | OUTPATIENT
Start: 2021-11-11 | End: 2021-11-16

## 2021-11-12 RX ORDER — ARIPIPRAZOLE 2 MG/1
2 TABLET ORAL DAILY
Qty: 90 TABLET | Refills: 0 | Status: SHIPPED | OUTPATIENT
Start: 2021-11-12 | End: 2021-11-16

## 2021-11-12 RX ORDER — SERTRALINE HYDROCHLORIDE 100 MG/1
TABLET, FILM COATED ORAL
Qty: 270 TABLET | Refills: 0 | Status: SHIPPED | OUTPATIENT
Start: 2021-11-12 | End: 2021-12-31

## 2021-11-12 RX ORDER — LISINOPRIL 10 MG/1
10 TABLET ORAL DAILY
Qty: 90 TABLET | Refills: 0 | Status: SHIPPED | OUTPATIENT
Start: 2021-11-12 | End: 2022-04-29

## 2021-11-12 RX ORDER — SIMVASTATIN 40 MG
TABLET ORAL
Qty: 90 TABLET | Refills: 0 | Status: SHIPPED | OUTPATIENT
Start: 2021-11-12 | End: 2022-04-28

## 2021-11-12 RX ORDER — POTASSIUM CHLORIDE 1500 MG/1
20 TABLET, EXTENDED RELEASE ORAL DAILY
Qty: 90 TABLET | Refills: 0 | Status: SHIPPED | OUTPATIENT
Start: 2021-11-12 | End: 2021-12-31

## 2021-11-12 NOTE — TELEPHONE ENCOUNTER
Routing refill request to provider for review/approval because:  Labs not current:  BMP & Lipids > year - future orders in place  Patient needs to be seen because:  Due for wellness exam - appointment scheduled for 11/16/2021 with Dr. Wu   PHQ9: 7 on 5/2021    Jona Bernard RN

## 2021-11-16 ENCOUNTER — OFFICE VISIT (OUTPATIENT)
Dept: FAMILY MEDICINE | Facility: CLINIC | Age: 66
End: 2021-11-16
Payer: COMMERCIAL

## 2021-11-16 VITALS
HEIGHT: 62 IN | BODY MASS INDEX: 34.71 KG/M2 | TEMPERATURE: 97.5 F | OXYGEN SATURATION: 99 % | DIASTOLIC BLOOD PRESSURE: 82 MMHG | HEART RATE: 71 BPM | SYSTOLIC BLOOD PRESSURE: 122 MMHG | WEIGHT: 188.6 LBS

## 2021-11-16 DIAGNOSIS — R73.09 ABNORMAL GLUCOSE: ICD-10-CM

## 2021-11-16 DIAGNOSIS — E78.5 HYPERLIPIDEMIA LDL GOAL <130: ICD-10-CM

## 2021-11-16 DIAGNOSIS — E03.8 SUBCLINICAL HYPOTHYROIDISM: ICD-10-CM

## 2021-11-16 DIAGNOSIS — Z12.31 ENCOUNTER FOR SCREENING MAMMOGRAM FOR BREAST CANCER: ICD-10-CM

## 2021-11-16 DIAGNOSIS — F33.2 SEVERE EPISODE OF RECURRENT MAJOR DEPRESSIVE DISORDER, WITHOUT PSYCHOTIC FEATURES (H): ICD-10-CM

## 2021-11-16 DIAGNOSIS — R39.15 URINARY URGENCY: ICD-10-CM

## 2021-11-16 DIAGNOSIS — Z00.00 MEDICARE ANNUAL WELLNESS VISIT, SUBSEQUENT: Primary | ICD-10-CM

## 2021-11-16 DIAGNOSIS — Z87.891 PERSONAL HISTORY OF TOBACCO USE: ICD-10-CM

## 2021-11-16 DIAGNOSIS — I10 ESSENTIAL HYPERTENSION WITH GOAL BLOOD PRESSURE LESS THAN 140/90: ICD-10-CM

## 2021-11-16 DIAGNOSIS — M16.11 PRIMARY OSTEOARTHRITIS OF RIGHT HIP: ICD-10-CM

## 2021-11-16 LAB
ALBUMIN SERPL-MCNC: 3.8 G/DL (ref 3.4–5)
ALBUMIN UR-MCNC: NEGATIVE MG/DL
ALP SERPL-CCNC: 142 U/L (ref 40–150)
ALT SERPL W P-5'-P-CCNC: 37 U/L (ref 0–50)
ANION GAP SERPL CALCULATED.3IONS-SCNC: 6 MMOL/L (ref 3–14)
APPEARANCE UR: CLEAR
AST SERPL W P-5'-P-CCNC: 20 U/L (ref 0–45)
BACTERIA #/AREA URNS HPF: ABNORMAL /HPF
BILIRUB SERPL-MCNC: 0.5 MG/DL (ref 0.2–1.3)
BILIRUB UR QL STRIP: NEGATIVE
BUN SERPL-MCNC: 22 MG/DL (ref 7–30)
CALCIUM SERPL-MCNC: 9.2 MG/DL (ref 8.5–10.1)
CHLORIDE BLD-SCNC: 107 MMOL/L (ref 94–109)
CO2 SERPL-SCNC: 25 MMOL/L (ref 20–32)
COLOR UR AUTO: YELLOW
CREAT SERPL-MCNC: 0.74 MG/DL (ref 0.52–1.04)
GFR SERPL CREATININE-BSD FRML MDRD: 84 ML/MIN/1.73M2
GLUCOSE BLD-MCNC: 70 MG/DL (ref 70–99)
GLUCOSE UR STRIP-MCNC: NEGATIVE MG/DL
HBA1C MFR BLD: 5.7 % (ref 0–5.6)
HGB UR QL STRIP: ABNORMAL
KETONES UR STRIP-MCNC: NEGATIVE MG/DL
LDLC SERPL CALC-MCNC: 88 MG/DL
LEUKOCYTE ESTERASE UR QL STRIP: ABNORMAL
NITRATE UR QL: NEGATIVE
PH UR STRIP: 5.5 [PH] (ref 5–7)
POTASSIUM BLD-SCNC: 4 MMOL/L (ref 3.4–5.3)
PROT SERPL-MCNC: 7.6 G/DL (ref 6.8–8.8)
RBC #/AREA URNS AUTO: ABNORMAL /HPF
SODIUM SERPL-SCNC: 138 MMOL/L (ref 133–144)
SP GR UR STRIP: 1.02 (ref 1–1.03)
SQUAMOUS #/AREA URNS AUTO: ABNORMAL /LPF
T4 FREE SERPL-MCNC: 0.76 NG/DL (ref 0.76–1.46)
TSH SERPL DL<=0.005 MIU/L-ACNC: 4.13 MU/L (ref 0.4–4)
UROBILINOGEN UR STRIP-ACNC: 0.2 E.U./DL
WBC #/AREA URNS AUTO: ABNORMAL /HPF

## 2021-11-16 PROCEDURE — 99214 OFFICE O/P EST MOD 30 MIN: CPT | Mod: 25 | Performed by: FAMILY MEDICINE

## 2021-11-16 PROCEDURE — 83721 ASSAY OF BLOOD LIPOPROTEIN: CPT | Performed by: FAMILY MEDICINE

## 2021-11-16 PROCEDURE — 80053 COMPREHEN METABOLIC PANEL: CPT | Performed by: FAMILY MEDICINE

## 2021-11-16 PROCEDURE — 84443 ASSAY THYROID STIM HORMONE: CPT | Performed by: FAMILY MEDICINE

## 2021-11-16 PROCEDURE — 83036 HEMOGLOBIN GLYCOSYLATED A1C: CPT | Performed by: FAMILY MEDICINE

## 2021-11-16 PROCEDURE — 84439 ASSAY OF FREE THYROXINE: CPT | Performed by: FAMILY MEDICINE

## 2021-11-16 PROCEDURE — 36415 COLL VENOUS BLD VENIPUNCTURE: CPT | Performed by: FAMILY MEDICINE

## 2021-11-16 PROCEDURE — 81001 URINALYSIS AUTO W/SCOPE: CPT | Performed by: FAMILY MEDICINE

## 2021-11-16 PROCEDURE — 99397 PER PM REEVAL EST PAT 65+ YR: CPT | Performed by: FAMILY MEDICINE

## 2021-11-16 RX ORDER — ARIPIPRAZOLE 2 MG/1
4 TABLET ORAL DAILY
Qty: 90 TABLET | Refills: 0 | COMMUNITY
Start: 2021-11-16 | End: 2021-12-06

## 2021-11-16 RX ORDER — SENNOSIDES 8.6 MG
650 CAPSULE ORAL EVERY 8 HOURS PRN
Status: ON HOLD | COMMUNITY
End: 2022-06-17

## 2021-11-16 ASSESSMENT — ENCOUNTER SYMPTOMS
EYE PAIN: 0
NERVOUS/ANXIOUS: 1
SORE THROAT: 0
DIARRHEA: 0
ABDOMINAL PAIN: 0
CHILLS: 0
FEVER: 0
PALPITATIONS: 0
ARTHRALGIAS: 1
FREQUENCY: 1
SHORTNESS OF BREATH: 1
COUGH: 0
DIZZINESS: 1
JOINT SWELLING: 1
HEADACHES: 0
NAUSEA: 0
DYSURIA: 0
CONSTIPATION: 0
PARESTHESIAS: 0
WEAKNESS: 0
HEMATOCHEZIA: 0
HEMATURIA: 0
BREAST MASS: 0
HEARTBURN: 0
MYALGIAS: 1

## 2021-11-16 ASSESSMENT — PATIENT HEALTH QUESTIONNAIRE - PHQ9: 5. POOR APPETITE OR OVEREATING: NOT AT ALL

## 2021-11-16 ASSESSMENT — ACTIVITIES OF DAILY LIVING (ADL)
CURRENT_FUNCTION: HOUSEWORK REQUIRES ASSISTANCE
CURRENT_FUNCTION: LAUNDRY REQUIRES ASSISTANCE
CURRENT_FUNCTION: BATHING REQUIRES ASSISTANCE
CURRENT_FUNCTION: SHOPPING REQUIRES ASSISTANCE

## 2021-11-16 ASSESSMENT — ANXIETY QUESTIONNAIRES
1. FEELING NERVOUS, ANXIOUS, OR ON EDGE: SEVERAL DAYS
6. BECOMING EASILY ANNOYED OR IRRITABLE: NOT AT ALL
2. NOT BEING ABLE TO STOP OR CONTROL WORRYING: MORE THAN HALF THE DAYS
5. BEING SO RESTLESS THAT IT IS HARD TO SIT STILL: NOT AT ALL
GAD7 TOTAL SCORE: 5
3. WORRYING TOO MUCH ABOUT DIFFERENT THINGS: SEVERAL DAYS
7. FEELING AFRAID AS IF SOMETHING AWFUL MIGHT HAPPEN: SEVERAL DAYS

## 2021-11-16 ASSESSMENT — MIFFLIN-ST. JEOR: SCORE: 1334.76

## 2021-11-16 NOTE — PROGRESS NOTES
"SUBJECTIVE:   Dee Dee Shepherd is a 66 year old female who presents for Preventive Visit.      Patient has been advised of split billing requirements and indicates understanding: Yes     Are you in the first 12 months of your Medicare coverage?  No    Healthy Habits:     In general, how would you rate your overall health?  Fair    Frequency of exercise:  None    Do you usually eat at least 4 servings of fruit and vegetables a day, include whole grains    & fiber and avoid regularly eating high fat or \"junk\" foods?  No    Taking medications regularly:  Yes    Medication side effects:  Muscle aches and Other    Ability to successfully perform activities of daily living:  Shopping requires assistance, housework requires assistance, bathing requires assistance and laundry requires assistance    Home Safety:  No safety concerns identified    Hearing Impairment:  Difficulty following a conversation in a noisy restaurant or crowded room and difficulty understanding soft or whispered speech    In the past 6 months, have you been bothered by leaking of urine? Yes    In general, how would you rate your overall mental or emotional health?  Fair      PHQ-2 Total Score: 2    Additional concerns today:  No    Concerns:  * right hip pain  - had her hip replacement scheduled for September but was cancelled due to Covid.  Has really yessi bothering her.  Not sure what she can do.    Do you feel safe in your environment? Yes    Have you ever done Advance Care Planning? (For example, a Health Directive, POLST, or a discussion with a medical provider or your loved ones about your wishes):        Fall risk  Fallen 2 or more times in the past year?: No  Any fall with injury in the past year?: No    Cognitive Screening   1) Repeat 3 items (Leader, Season, Table)    2) Clock draw: NORMAL  3) 3 item recall: Recalls 2 objects   Results: NORMAL clock, 1-2 items recalled: COGNITIVE IMPAIRMENT LESS LIKELY    Mini-CogTM Copyright S [employer]. " Licensed by the author for use in [facility]; reprinted with permission (mercedes@[facility]). All rights reserved.      Do you have sleep apnea, excessive snoring or daytime drowsiness?: no    Reviewed and updated as needed this visit by clinical staff  Tobacco  Allergies  Meds   Med Hx  Surg Hx  Fam Hx  Soc Hx       Reviewed and updated as needed this visit by Provider  Tobacco  Allergies  Meds   Med Hx  Surg Hx  Fam Hx  Soc Hx      Social History     Tobacco Use     Smoking status: Current Every Day Smoker     Packs/day: 0.50     Years: 35.00     Pack years: 17.50     Types: Cigarettes     Smokeless tobacco: Never Used   Substance Use Topics     Alcohol use: Yes     Comment: weekends drinking more 3-5 drinks         Alcohol Use 11/16/2021   Prescreen: >3 drinks/day or >7 drinks/week? No   Prescreen: >3 drinks/day or >7 drinks/week? -         Current providers sharing in care for this patient include:   Patient Care Team:  Dalia Wu DO as PCP - General (Family Practice)  Sukhdev Nunes DO as Assigned Musculoskeletal Provider  Dalia Wu DO as Assigned PCP    The following health maintenance items are reviewed in Epic and correct as of today:  Health Maintenance Due   Topic Date Due     ANNUAL REVIEW OF HM ORDERS  Never done     LUNG CANCER SCREENING  06/13/2020     FALL RISK ASSESSMENT  08/14/2021     COVID-19 Vaccine (3 - Booster for Moderna series) 10/22/2021     PHQ-9  11/17/2021         FHS-7:   Breast CA Risk Assessment (FHS-7) 11/16/2021   Did any of your first-degree relatives have breast or ovarian cancer? Yes   Did any of your relatives have bilateral breast cancer? Yes   Did any man in your family have breast cancer? No   Did any woman in your family have breast and ovarian cancer? Yes   Did any woman in your family have breast cancer before age 50 y? No   Do you have 2 or more relatives with breast and/or ovarian cancer? Yes   Do you have 2 or more relatives  "with breast and/or bowel cancer? Yes         Review of Systems   Constitutional: Negative for chills and fever.   HENT: Negative for congestion, ear pain, hearing loss and sore throat.    Eyes: Positive for visual disturbance. Negative for pain.   Respiratory: Positive for shortness of breath. Negative for cough.    Cardiovascular: Positive for peripheral edema. Negative for chest pain and palpitations.   Gastrointestinal: Negative for abdominal pain, constipation, diarrhea, heartburn, hematochezia and nausea.   Breasts:  Negative for tenderness, breast mass and discharge.   Genitourinary: Positive for frequency and urgency. Negative for dysuria, genital sores, hematuria, pelvic pain, vaginal bleeding and vaginal discharge.   Musculoskeletal: Positive for arthralgias, joint swelling and myalgias.   Skin: Negative for rash.   Neurological: Positive for dizziness. Negative for weakness, headaches and paresthesias.   Psychiatric/Behavioral: Positive for mood changes. The patient is nervous/anxious.      Feels like she is having trouble controlling her bladder for the last 3 months.  Feels like this is an issue a few days per week.  Occasionally incontinent.  Feels she has some very slight urge incontinence several days per week.  No burning or pain with urination.  No hematuria.   Might occasionally leak with cough and laugh.       Has eye doctor visit coming up.  No real vision concerns.     Feels SOB is related to lack of exercise.  Might get SOB when carrying groceries up the stairs but not really an issue at other times.  Does not notice it at work.     Might get a sock line on her calves if she wears her socks all day.  Notes it at the end of the day.  Seems resolved in the AM.  No PND or orthopnea.       Rare \"dizzy\" feeling of being \"off\"  No vertigo or presyncope.  Rare and unchanged over years.        Feels mood issues are related to hip pain and the inability to get things down around her home.  Feels the " "abilify has been helpful      OBJECTIVE:   /82   Pulse 71   Temp 97.5  F (36.4  C) (Tympanic)   Ht 1.568 m (5' 1.75\")   Wt 85.5 kg (188 lb 9.6 oz)   SpO2 99%   Breastfeeding No   BMI 34.78 kg/m   Estimated body mass index is 34.78 kg/m  as calculated from the following:    Height as of this encounter: 1.568 m (5' 1.75\").    Weight as of this encounter: 85.5 kg (188 lb 9.6 oz).  Physical Exam  GENERAL APPEARANCE: healthy, alert and no distress  EYES: Eyes grossly normal to inspection, PERRL and conjunctivae and sclerae normal  NECK: no adenopathy, no asymmetry, masses, or scars and thyroid normal to palpation  RESP: lungs clear to auscultation - no rales, rhonchi or wheezes  CV: regular rate and rhythm, normal S1 S2, no S3 or S4, no murmur, click or rub, no peripheral edema and peripheral pulses strong  ABDOMEN: soft, nontender, no hepatosplenomegaly, no masses and bowel sounds normal  MS: no musculoskeletal defects are noted and gait is age appropriate without ataxia  NEURO: Normal strength and tone, sensory exam grossly normal, mentation intact and speech normal  PSYCH: mentation appears normal and affect normal/bright    Diagnostic Test Results:  Labs reviewed in Epic    ASSESSMENT / PLAN:       ICD-10-CM    1. Medicare annual wellness visit, subsequent  Z00.00    2. Primary osteoarthritis of right hip  M16.11 Orthopedic  Referral   3. Urinary urgency  R39.15 UA with Microscopic reflex to Culture - lab collect     UA with Microscopic reflex to Culture - lab collect     UA with Microscopic reflex to Culture - lab collect     Urine Microscopic   4. Essential hypertension with goal blood pressure less than 140/90  I10 Comprehensive metabolic panel (BMP + Alb, Alk Phos, ALT, AST, Total. Bili, TP)     Comprehensive metabolic panel (BMP + Alb, Alk Phos, ALT, AST, Total. Bili, TP)   5. Hyperlipidemia LDL goal <130  E78.5 Comprehensive metabolic panel (BMP + Alb, Alk Phos, ALT, AST, Total. Bili, TP) " "    LDL cholesterol direct     Comprehensive metabolic panel (BMP + Alb, Alk Phos, ALT, AST, Total. Bili, TP)     LDL cholesterol direct   6. Severe episode of recurrent major depressive disorder, without psychotic features (H)  F33.2 MENTAL HEALTH REFERRAL  - Adult; Outpatient Treatment; Individual/Couples/Family/Group Therapy/Health Psychology; [facility] [phone]; We will contact you to schedule the appointment or please call with any questions     ARIPiprazole (ABILIFY) 2 MG tablet   7. Subclinical hypothyroidism  E03.8 TSH with free T4 reflex     TSH with free T4 reflex     T4 free   8. Encounter for screening mammogram for breast cancer  Z12.31 *MA Screening Digital Bilateral   9. Personal history of tobacco use  Z87.891 CT Chest Lung Cancer Scrn Low Dose wo   10. Abnormal glucose  R73.09 Hemoglobin A1c     Hemoglobin A1c     I'll let you know the urine results from today when available.  Check out the recommendations on the bladder training hand out.  If things are not improved in 1-2 months please let me know.    You'll be getting a phone call to schedule with a counselor.  I think this would be a good idea!  We'll also try increasing your Abilify to 4mg daily.  You'll take 2 2 mg tablets daily.  We should follow up virtually in 4-6 weeks to see how things are going.    I placed a new referral to Dr Nunes to consider another injection in the hip to try to get you through to a surgery time.    Please call [phone] to schedule your mammogram and your CT scan for lung cancer screening      Patient has been advised of split billing requirements and indicates understanding: Yes  COUNSELING:  Reviewed preventive health counseling, as reflected in patient instructions    Estimated body mass index is 34.78 kg/m  as calculated from the following:    Height as of this encounter: 1.568 m (5' 1.75\").    Weight as of this encounter: 85.5 kg (188 lb 9.6 oz).    Weight management plan: Discussed " healthy diet and exercise guidelines    She reports that she has been smoking cigarettes. She has a 17.50 pack-year smoking history. She has never used smokeless tobacco.  Tobacco Cessation Action Plan:   Information offered: Patient not interested at this time      Appropriate preventive services were discussed with this patient, including applicable screening as appropriate for cardiovascular disease, diabetes, osteopenia/osteoporosis, and glaucoma.  As appropriate for age/gender, discussed screening for colorectal cancer, prostate cancer, breast cancer, and cervical cancer. Checklist reviewing preventive services available has been given to the patient.    Reviewed patients plan of care and provided an AVS. The Intermediate Care Plan ( asthma action plan, low back pain action plan, and migraine action plan) for Dee Dee meets the Care Plan requirement. This Care Plan has been established and reviewed with the Patient.    Counseling Resources:  ATP IV Guidelines  Pooled Cohorts Equation Calculator  Breast Cancer Risk Calculator  Breast Cancer: Medication to Reduce Risk  FRAX Risk Assessment  ICSI Preventive Guidelines  Dietary Guidelines for Americans, 2010  "[employer]'s MyPlate  ASA Prophylaxis  Lung CA Screening    [facility]    Identified Health Risks:  Lung Cancer Screening Shared Decision Making Visit     Dee Dee Shepherd is eligible for lung cancer screening on the basis of the information provided in my signed lung cancer screening order.     I have discussed with patient the risks and benefits of screening for lung cancer with low-dose CT.     The risks include:  radiation exposure: one low dose chest CT has as much ionizing radiation as about 15 chest x-rays or 6 months of background radiation living in [place]    false positives: 96% of positive findings/nodules are NOT cancer, but some might still require additional diagnostic evaluation, including  biopsy  over-diagnosis: some slow growing cancers that might never have been clinically significant will be detected and treated unnecessarily     The benefit of early detection of lung cancer is contingent upon adherence to annual screening or more frequent follow up if indicated.     Furthermore, reaping the benefits of screening requires Dee Dee SANKET [place] to be willing and physically able to undergo diagnostic procedures, if indicated. Although no specific guide is available for determining severity of comorbidities, it is reasonable to withhold screening in patients who have greater mortality risk from other diseases.     We did discuss that the only way to prevent lung cancer is to not smoke. Smoking cessation counseling was given, duration < 3 minutes.      I did not offer risk estimation using a calculator such as this one:    ShouldIScreen    .  Patient Instructions       Preventive Health Recommendations    See your health care provider every year to    Review health changes.     Discuss preventive care.      Review your medicines if your doctor has prescribed any.    You no longer need a yearly Pap test unless you've had an abnormal Pap test in the past 10 years. If you have vaginal symptoms, such as bleeding or discharge, be sure to talk with your provider about a Pap test.    Every 1 to 2 years, have a mammogram.  If you are over 69, talk with your health care provider about whether or not you want to continue having screening mammograms.    Every 10 years, have a colonoscopy. Or, have a yearly FIT test (stool test). These exams will check for colon cancer.     Have a cholesterol test every 5 years, or more often if your doctor advises it.     Have a diabetes test (fasting glucose) every three years. If you are at risk for diabetes, you should have this test more often.     At age 65, have a bone density scan (DEXA) to check for osteoporosis (brittle bone disease).    Shots:    Get a flu shot each  year.    Get a tetanus shot every 10 years.    Talk to your doctor about your pneumonia vaccines. There are now two you should receive - Pneumovax (PPSV 23) and Prevnar (PCV 13).    Talk to your pharmacist about the shingles vaccine.    Talk to your doctor about the hepatitis B vaccine.    Nutrition:     Eat at least 5 servings of fruits and vegetables each day.    Eat whole-grain bread, whole-wheat pasta and brown rice instead of white grains and rice.    Get adequate Calcium and Vitamin D.     Lifestyle    Exercise at least 150 minutes a week (30 minutes a day, 5 days a week). This will help you control your weight and prevent disease.    Limit alcohol to one drink per day.    No smoking.     Wear sunscreen to prevent skin cancer.     See your dentist twice a year for an exam and cleaning.    See your eye doctor every 1 to 2 years to screen for conditions such as glaucoma, macular degeneration and cataracts.      Lung Cancer Screening   Frequently Asked Questions  If you are at high-risk for lung cancer, getting screened with low-dose computed tomography (LDCT) every year can help save your life. This handout offers answers to some of the most common questions about lung cancer screening. If you have other questions, please call [phone] ([phone]).     What is it?  Lung cancer screening uses special X-ray technology to create an image of your lung tissue. The exam is quick and easy and takes less than 10 seconds. We don t give you any medicine or use any needles. You can eat before and after the exam. You don t need to change your clothes as long as the clothing on your chest doesn t contain metal. But, you do need to be able to hold your breath for at least 6 seconds during the exam.    What is the goal of lung cancer screening?  The goal of lung cancer screening is to save lives. Many times, lung cancer is not found until a person starts having physical symptoms. Lung cancer screening can help  detect lung cancer in the earliest stages when it may be easier to treat.    Who should be screened for lung cancer?  We suggest lung cancer screening for anyone who is at high-risk for lung cancer. You are in the high-risk group if you:      are between the ages of 55 and 79, and    have smoked at least 1 pack of cigarettes a day for 30 or more years, and    still smoke or have quit within the past 15 years.    However, if you have a new cough or shortness of breath, you should talk to your doctor before being screened.    Some national lung health advocacy groups also recommend screening for people ages 50 to 79 who have smoked an average of 1 pack of cigarettes a day for 20 years. They must also have at least 1 other risk factor for lung cancer, not including exposure to secondhand smoke. Other risk factors are having had cancer in the past, emphysema, pulmonary fibrosis, COPD, a family history of lung cancer, or exposure to certain materials such as arsenic, asbestos, beryllium, cadmium, chromium, diesel fumes, nickel, radon or silica. Your care team can help you know if you have one of these risk factors.     Why does it matter if I have symptoms?  Certain symptoms can be a sign that you have a condition in your lungs that should be checked and treated by your doctor. These symptoms include fever, chest pain, a new or changing cough, shortness of breath that you have never felt before, coughing up blood or unexplained weight loss. Having any of these symptoms can greatly affect the results of lung cancer screening.       Should all smokers get an LDCT lung cancer screening exam?  It depends. Lung cancer screening is for a very specific group of men and women who have a history of heavy smoking over a long period of time (see  Who should be screened for lung cancer  above).  I am in the high-risk group, but have been diagnosed with cancer in the past. Is LDCT lung cancer screening right for me?  In some cases,  you should not have LDCT lung screening, such as when your doctor is already following your cancer with CT scan studies. Your doctor will help you decide if LDCT lung screening is right for you.  Do I need to have a screening exam every year?  Yes. If you are in the high-risk group described earlier, you should get an LDCT lung cancer screening exam every year until you are 79, or are no longer willing or able to undergo screening and possible procedures to diagnose and treat lung cancer.  How effective is LDCT at preventing death from lung cancer?  Studies have shown that LDCT lung cancer screening can lower the risk of death from lung cancer by 20 percent in people who are at high-risk.  What are the risks?  There are some risks and limitations of LDCT lung cancer screening. We want to make sure you understand the risks and benefits, so please let us know if you have any questions. Your doctor may want to talk with you more about these risks.    Radiation exposure: As with any exam that uses radiation, there is a very small increased risk of cancer. The amount of radiation in LDCT is small--about the same amount a person would get from a mammogram. Your doctor orders the exam when he or she feels the potential benefits outweigh the risks.    False negatives: No test is perfect, including LDCT. It is possible that you may have a medical condition, including lung cancer, that is not found during your exam. This is called a false negative result.    False positives and more testing: LDCT very often finds something in the lung that could be cancer, but in fact is not. This is called a false positive result. False positive tests often cause anxiety. To make sure these findings are not cancer, you may need to have more tests. These tests will be done only if you give us permission. Sometimes patients need a treatment that can have side effects, such as a biopsy. For more information on false positives, see  What can I  expect from the results?     Findings not related to lung cancer: Your LDCT exam also takes pictures of areas of your body next to your lungs. In a very small number of cases, the CT scan will show an abnormal finding in one of these areas, such as your kidneys, adrenal glands, liver or thyroid. This finding may not be serious, but you may need more tests. Your doctor can help you decide what other tests you may need, if any.  What can I expect from the results?  About 1 out of 4 LDCT exams will find something that may need more tests. Most of the time, these findings are lung nodules. Lung nodules are very small collections of tissue in the lung. These nodules are very common, and the vast majority--more than 97 percent--are not cancer (benign). Most are normal lymph nodes or small areas of scarring from past infections.  But, if a small lung nodule is found to be cancer, the cancer can be cured more than 90 percent of the time. To know if the nodule is cancer, we may need to get more images before your next yearly screening exam. If the nodule has suspicious features (for example, it is large, has an odd shape or grows over time), we will refer you to a specialist for further testing.  Will my doctor also get the results?  Yes. Your doctor will get a copy of your results.  Is it okay to keep smoking now that there s a cancer screening exam?  No. Tobacco is one of the strongest cancer-causing agents. It causes not only lung cancer, but other cancers and cardiovascular (heart) diseases as well. The damage caused by smoking builds over time. This means that the longer you smoke, the higher your risk of disease. While it is never too late to quit, the sooner you quit, the better.  Where can I find help to quit smoking?  The best way to prevent lung cancer is to stop smoking. If you have already quit smoking, congratulations and keep it up! For help on quitting smoking, please call QUITPLAN at 1-536-941-UUFQ (6978) or  the American Cancer Society at 1-218.640.8240 to find local resources near you.  One-on-one health coaching:  If you d prefer to work individually with a health care provider on tobacco cessation, we offer:      Medication Therapy Management:  Our specially trained pharmacists work closely with you and your doctor to help you quit smoking.  Call [phone] or [phone] (toll free).     Can Do: Health coaching offered by [facility].  [email or web address]

## 2021-11-16 NOTE — LETTER
"November 22, 2021      Dee Dee Shepherd  [address]  [facility] [address]        Dear ,    We are writing to inform you of your test results.    Your urine test did not show infection.  You can go ahead and try those exercises we gave you for the bladder.     Your electrolytes, kidney and liver testing were all normal.  The 3 months measure of blood sugar (A1c) was slightly elevated.  This is consistent with \"prediabetes\" and we should check this annually.     Your LDL (bad) cholesterol looked great.     The thyroid was slightly elevated, consistent with subclinical hypothyroidism.  No treatment is needed here either, we'll just monitor.     Dalia Wu, DO/am    Resulted Orders   UA with Microscopic reflex to Culture - lab collect   Result Value Ref Range    Color Urine Yellow Colorless, Straw, Light Yellow, Yellow    Appearance Urine Clear Clear    Glucose Urine Negative Negative mg/dL    Bilirubin Urine Negative Negative    Ketones Urine Negative Negative mg/dL    Specific Gravity Urine 1.025 1.003 - 1.035    Blood Urine Small (A) Negative    pH Urine 5.5 5.0 - 7.0    Protein Albumin Urine Negative Negative mg/dL    Urobilinogen Urine 0.2 0.2, 1.0 E.U./dL    Nitrite Urine Negative Negative    Leukocyte Esterase Urine Trace (A) Negative   Comprehensive metabolic panel (BMP + Alb, Alk Phos, ALT, AST, Total. Bili, TP)   Result Value Ref Range    Sodium 138 133 - 144 mmol/L    Potassium 4.0 3.4 - 5.3 mmol/L    Chloride 107 94 - 109 mmol/L    Carbon Dioxide (CO2) 25 20 - 32 mmol/L    Anion Gap 6 3 - 14 mmol/L    Urea Nitrogen 22 7 - 30 mg/dL    Creatinine 0.74 0.52 - 1.04 mg/dL    Calcium 9.2 8.5 - 10.1 mg/dL    Glucose 70 70 - 99 mg/dL    Alkaline Phosphatase 142 40 - 150 U/L    AST 20 0 - 45 U/L    ALT 37 0 - 50 U/L    Protein Total 7.6 6.8 - 8.8 g/dL    Albumin 3.8 3.4 - 5.0 g/dL    Bilirubin Total 0.5 0.2 - 1.3 mg/dL    GFR Estimate 84 >60 mL/min/1.73m2      Comment:      As of July 11, " 2021, eGFR is calculated by the CKD-EPI creatinine equation, without race adjustment. eGFR can be influenced by muscle mass, exercise, and diet. The reported eGFR is an estimation only and is only applicable if the renal function is stable.   LDL cholesterol direct   Result Value Ref Range    LDL Cholesterol Direct 88 <100 mg/dL      Comment:      Age 0-19 years:  Desirable: 0-110 mg/dL   Borderline high: 110-129 mg/dL   High: >= 130 mg/dL    Age 20 years and older:  Desirable: <100mg/dL  Above desirable: 100-129 mg/dL   Borderline high: 130-159 mg/dL   High: 160-189 mg/dL   Very high: >= 190 mg/dL   Hemoglobin A1c   Result Value Ref Range    Hemoglobin A1C 5.7 (H) 0.0 - 5.6 %      Comment:      Normal <5.7%   Prediabetes 5.7-6.4%    Diabetes 6.5% or higher     Note: Adopted from ADA consensus guidelines.   TSH with free T4 reflex   Result Value Ref Range    TSH 4.13 (H) 0.40 - 4.00 mU/L   Urine Microscopic   Result Value Ref Range    Bacteria Urine Few (A) None Seen /HPF    RBC Urine 0-2 0-2 /HPF /HPF    WBC Urine 5-10 (A) 0-5 /HPF /HPF    Squamous Epithelials Urine Moderate (A) None Seen /LPF    Narrative    Urine Culture not indicated   T4 free   Result Value Ref Range    Free T4 0.76 0.76 - 1.46 ng/dL

## 2021-11-16 NOTE — PATIENT INSTRUCTIONS
I'll let you know the urine results from today when available.  Check out the recommendations on the bladder training hand out.  If things are not improved in 1-2 months please let me know.    You'll be getting a phone call to schedule with a counselor.  I think this would be a good idea!  We'll also try increasing your Abilify to 4mg daily.  You'll take 2 2 mg tablets daily.  We should follow up virtually in 4-6 weeks to see how things are going.    I placed a new referral to Dr Nunes to consider another injection in the hip to try to get you through to a surgery time.    Please call [phone] to schedule your mammogram and your CT scan for lung cancer screening      Preventive Health Recommendations    See your health care provider every year to    Review health changes.     Discuss preventive care.      Review your medicines if your doctor has prescribed any.    You no longer need a yearly Pap test unless you've had an abnormal Pap test in the past 10 years. If you have vaginal symptoms, such as bleeding or discharge, be sure to talk with your provider about a Pap test.    Every 1 to 2 years, have a mammogram.  If you are over 69, talk with your health care provider about whether or not you want to continue having screening mammograms.    Every 10 years, have a colonoscopy. Or, have a yearly FIT test (stool test). These exams will check for colon cancer.     Have a cholesterol test every 5 years, or more often if your doctor advises it.     Have a diabetes test (fasting glucose) every three years. If you are at risk for diabetes, you should have this test more often.     At age 65, have a bone density scan (DEXA) to check for osteoporosis (brittle bone disease).    Shots:    Get a flu shot each year.    Get a tetanus shot every 10 years.    Talk to your doctor about your pneumonia vaccines. There are now two you should receive - Pneumovax (PPSV 23) and Prevnar (PCV 13).    Talk to your pharmacist about the  shingles vaccine.    Talk to your doctor about the hepatitis B vaccine.    Nutrition:     Eat at least 5 servings of fruits and vegetables each day.    Eat whole-grain bread, whole-wheat pasta and brown rice instead of white grains and rice.    Get adequate Calcium and Vitamin D.     Lifestyle    Exercise at least 150 minutes a week (30 minutes a day, 5 days a week). This will help you control your weight and prevent disease.    Limit alcohol to one drink per day.    No smoking.     Wear sunscreen to prevent skin cancer.     See your dentist twice a year for an exam and cleaning.    See your eye doctor every 1 to 2 years to screen for conditions such as glaucoma, macular degeneration and cataracts.      Lung Cancer Screening   Frequently Asked Questions  If you are at high-risk for lung cancer, getting screened with low-dose computed tomography (LDCT) every year can help save your life. This handout offers answers to some of the most common questions about lung cancer screening. If you have other questions, please call [phone] ([phone]).     What is it?  Lung cancer screening uses special X-ray technology to create an image of your lung tissue. The exam is quick and easy and takes less than 10 seconds. We don t give you any medicine or use any needles. You can eat before and after the exam. You don t need to change your clothes as long as the clothing on your chest doesn t contain metal. But, you do need to be able to hold your breath for at least 6 seconds during the exam.    What is the goal of lung cancer screening?  The goal of lung cancer screening is to save lives. Many times, lung cancer is not found until a person starts having physical symptoms. Lung cancer screening can help detect lung cancer in the earliest stages when it may be easier to treat.    Who should be screened for lung cancer?  We suggest lung cancer screening for anyone who is at high-risk for lung cancer. You are in the  high-risk group if you:      are between the ages of 55 and 79, and    have smoked at least 1 pack of cigarettes a day for 30 or more years, and    still smoke or have quit within the past 15 years.    However, if you have a new cough or shortness of breath, you should talk to your doctor before being screened.    Some national lung health advocacy groups also recommend screening for people ages 50 to 79 who have smoked an average of 1 pack of cigarettes a day for 20 years. They must also have at least 1 other risk factor for lung cancer, not including exposure to secondhand smoke. Other risk factors are having had cancer in the past, emphysema, pulmonary fibrosis, COPD, a family history of lung cancer, or exposure to certain materials such as arsenic, asbestos, beryllium, cadmium, chromium, diesel fumes, nickel, radon or silica. Your care team can help you know if you have one of these risk factors.     Why does it matter if I have symptoms?  Certain symptoms can be a sign that you have a condition in your lungs that should be checked and treated by your doctor. These symptoms include fever, chest pain, a new or changing cough, shortness of breath that you have never felt before, coughing up blood or unexplained weight loss. Having any of these symptoms can greatly affect the results of lung cancer screening.       Should all smokers get an LDCT lung cancer screening exam?  It depends. Lung cancer screening is for a very specific group of men and women who have a history of heavy smoking over a long period of time (see  Who should be screened for lung cancer  above).  I am in the high-risk group, but have been diagnosed with cancer in the past. Is LDCT lung cancer screening right for me?  In some cases, you should not have LDCT lung screening, such as when your doctor is already following your cancer with CT scan studies. Your doctor will help you decide if LDCT lung screening is right for you.  Do I need to have a  screening exam every year?  Yes. If you are in the high-risk group described earlier, you should get an LDCT lung cancer screening exam every year until you are 79, or are no longer willing or able to undergo screening and possible procedures to diagnose and treat lung cancer.  How effective is LDCT at preventing death from lung cancer?  Studies have shown that LDCT lung cancer screening can lower the risk of death from lung cancer by 20 percent in people who are at high-risk.  What are the risks?  There are some risks and limitations of LDCT lung cancer screening. We want to make sure you understand the risks and benefits, so please let us know if you have any questions. Your doctor may want to talk with you more about these risks.    Radiation exposure: As with any exam that uses radiation, there is a very small increased risk of cancer. The amount of radiation in LDCT is small--about the same amount a person would get from a mammogram. Your doctor orders the exam when he or she feels the potential benefits outweigh the risks.    False negatives: No test is perfect, including LDCT. It is possible that you may have a medical condition, including lung cancer, that is not found during your exam. This is called a false negative result.    False positives and more testing: LDCT very often finds something in the lung that could be cancer, but in fact is not. This is called a false positive result. False positive tests often cause anxiety. To make sure these findings are not cancer, you may need to have more tests. These tests will be done only if you give us permission. Sometimes patients need a treatment that can have side effects, such as a biopsy. For more information on false positives, see  What can I expect from the results?     Findings not related to lung cancer: Your LDCT exam also takes pictures of areas of your body next to your lungs. In a very small number of cases, the CT scan will show an abnormal finding  in one of these areas, such as your kidneys, adrenal glands, liver or thyroid. This finding may not be serious, but you may need more tests. Your doctor can help you decide what other tests you may need, if any.  What can I expect from the results?  About 1 out of 4 LDCT exams will find something that may need more tests. Most of the time, these findings are lung nodules. Lung nodules are very small collections of tissue in the lung. These nodules are very common, and the vast majority--more than 97 percent--are not cancer (benign). Most are normal lymph nodes or small areas of scarring from past infections.  But, if a small lung nodule is found to be cancer, the cancer can be cured more than 90 percent of the time. To know if the nodule is cancer, we may need to get more images before your next yearly screening exam. If the nodule has suspicious features (for example, it is large, has an odd shape or grows over time), we will refer you to a specialist for further testing.  Will my doctor also get the results?  Yes. Your doctor will get a copy of your results.  Is it okay to keep smoking now that there s a cancer screening exam?  No. Tobacco is one of the strongest cancer-causing agents. It causes not only lung cancer, but other cancers and cardiovascular (heart) diseases as well. The damage caused by smoking builds over time. This means that the longer you smoke, the higher your risk of disease. While it is never too late to quit, the sooner you quit, the better.  Where can I find help to quit smoking?  The best way to prevent lung cancer is to stop smoking. If you have already quit smoking, congratulations and keep it up! For help on quitting smoking, please call [employer] at 3-439-634-ELAQ (0044) or the American Cancer Society at 1-248.198.5154 to find local resources near you.  One-on-one health coaching:  If you d prefer to work individually with a health care provider on tobacco cessation, we offer:       Medication Therapy Management:  Our specially trained pharmacists work closely with you and your doctor to help you quit smoking.  Call [phone] or [phone] (toll free).     Can Do: Health coaching offered by [facility].  [email or web address]

## 2021-11-16 NOTE — NURSING NOTE
"Initial /82   Pulse 71   Temp 97.5  F (36.4  C) (Tympanic)   Ht 1.568 m (5' 1.75\")   Wt 85.5 kg (188 lb 9.6 oz)   SpO2 99%   Breastfeeding No   BMI 34.78 kg/m   Estimated body mass index is 34.78 kg/m  as calculated from the following:    Height as of this encounter: 1.568 m (5' 1.75\").    Weight as of this encounter: 85.5 kg (188 lb 9.6 oz). .      "

## 2021-11-17 ASSESSMENT — PATIENT HEALTH QUESTIONNAIRE - PHQ9: SUM OF ALL RESPONSES TO PHQ QUESTIONS 1-9: 18

## 2021-11-17 ASSESSMENT — ANXIETY QUESTIONNAIRES: GAD7 TOTAL SCORE: 5

## 2021-11-19 NOTE — PROGRESS NOTES
Dee Dee Shepherd  :  1955  DOS: 2021  MRN: [id number]    Sports Medicine Clinic Visit    PCP: {PCP:505053}    Dee Dee Shepherd is a 66 year old female who is seen in consultation at the request of  Dalia Wu D.O. presenting with right hip pain.    Injury: Gradual onset of pain over the past 2-3 year(s).  Pain located over right lateral hip and right groin, nonradiating.  Additional Features:  Positive: swelling in right lower leg, catching, popping and locking. Patient also complains of pain in right knee and low back. Symptoms are better with Rest and Sitting.  Symptoms are worse with: stairs and walking.  Other evaluation and/or treatments so far consists of: low back brace, icy hot.  Recent imaging completed: X-rays completed 3/24/17.  Prior History of related problems: Patient was previously seen in 2020 for chronic right hip pain.    Social History: currently employed as a skyler - desk job    Review of Systems  Musculoskeletal: as above  Remainder of review of systems is negative including constitutional, CV, pulmonary, GI, Skin and Neurologic except as noted in HPI or medical history.    Past Medical History:   Diagnosis Date     Anxiety      Chronic low back pain      Depressive disorder      Hyperlipidemia      Hypertension      Malignant neoplasm of breast (female) (H)     right     Pap smear abnormality of cervix with cytologic evidence of malignancy      Tobacco abuse      Past Surgical History:   Procedure Laterality Date     C LIGATE FALLOPIAN TUBE       COLONOSCOPY N/A 5/3/2021    Procedure: Colonoscopy, With Polypectomy And Biopsy;  Surgeon: Ney Torrez MD;  Location: [facility]     MASTECTOMY       Family History   Problem Relation Age of Onset     Hypertension Father      Diabetes Brother        Objective  There were no vitals taken for this visit.      General: healthy, alert and in no distress      HEENT: no scleral icterus or conjunctival erythema     Skin: no  "suspicious lesions or rash. No jaundice.     CV: regular rhythm by palpation, 2+ distal pulses, no pedal edema      Resp: normal respiratory effort without conversational dyspnea     Psych: normal mood and affect      Gait: ***antalgic, appropriate coordination and balance     Neuro: normal light touch sensory exam of the extremities. Motor strength as noted below       {RIGHT/LEFT:348977::\"Bilateral\"} hip exam    Inspection:   {hip inspection:013288::\"     no edema or ecchymosis in hip area\"}    ROM:   {FSOC hip ROM:459300::\"    Full active and passive ROM \"}    Strength:   {hip strength:741711}    Tender:   {hip Tenderness:310959}    Non Tender:   {hip non Tenderness:545094::\"     remainder of hip area\"}    Sensation:   {hip sensation:389155::\"     grossly intact in hip and thigh\"}    Skin:  {skin tests:319523::\"     well perfused\",\"     capillary refill brisk\"}    Special Tests:   {hip special tests:266420}    Radiology  ***    Assessment:  1. Primary osteoarthritis of right hip        Plan:  Discussed the assessment with the patient.  {[facility] Plan:124797::\"Follow up: ***\"}        Disclaimer: This note consists of symbols derived from keyboarding, dictation and/or voice recognition software. As a result, there may be errors in the script that have gone undetected. Please consider this when interpreting information found in this chart.    "

## 2021-11-27 ENCOUNTER — OFFICE VISIT (OUTPATIENT)
Dept: ORTHOPEDICS | Facility: CLINIC | Age: 66
End: 2021-11-27
Attending: FAMILY MEDICINE
Payer: COMMERCIAL

## 2021-11-27 VITALS
WEIGHT: 188 LBS | DIASTOLIC BLOOD PRESSURE: 83 MMHG | BODY MASS INDEX: 34.6 KG/M2 | SYSTOLIC BLOOD PRESSURE: 146 MMHG | HEIGHT: 62 IN

## 2021-11-27 DIAGNOSIS — M16.11 PRIMARY OSTEOARTHRITIS OF RIGHT HIP: ICD-10-CM

## 2021-11-27 PROCEDURE — 99213 OFFICE O/P EST LOW 20 MIN: CPT | Mod: 25 | Performed by: FAMILY MEDICINE

## 2021-11-27 PROCEDURE — 20611 DRAIN/INJ JOINT/BURSA W/US: CPT | Mod: RT | Performed by: FAMILY MEDICINE

## 2021-11-27 RX ORDER — LIDOCAINE HYDROCHLORIDE 10 MG/ML
4 INJECTION, SOLUTION INFILTRATION; PERINEURAL
Status: DISCONTINUED | OUTPATIENT
Start: 2021-11-27 | End: 2022-04-30 | Stop reason: ALTCHOICE

## 2021-11-27 RX ORDER — TRIAMCINOLONE ACETONIDE 40 MG/ML
40 INJECTION, SUSPENSION INTRA-ARTICULAR; INTRAMUSCULAR
Status: DISCONTINUED | OUTPATIENT
Start: 2021-11-27 | End: 2022-04-30 | Stop reason: ALTCHOICE

## 2021-11-27 RX ORDER — ROPIVACAINE HYDROCHLORIDE 5 MG/ML
3 INJECTION, SOLUTION EPIDURAL; INFILTRATION; PERINEURAL
Status: DISCONTINUED | OUTPATIENT
Start: 2021-11-27 | End: 2022-04-30 | Stop reason: ALTCHOICE

## 2021-11-27 RX ADMIN — LIDOCAINE HYDROCHLORIDE 4 ML: 10 INJECTION, SOLUTION INFILTRATION; PERINEURAL at 08:37

## 2021-11-27 RX ADMIN — TRIAMCINOLONE ACETONIDE 40 MG: 40 INJECTION, SUSPENSION INTRA-ARTICULAR; INTRAMUSCULAR at 08:37

## 2021-11-27 RX ADMIN — ROPIVACAINE HYDROCHLORIDE 3 ML: 5 INJECTION, SOLUTION EPIDURAL; INFILTRATION; PERINEURAL at 08:37

## 2021-11-27 ASSESSMENT — MIFFLIN-ST. JEOR: SCORE: 1332.04

## 2021-11-27 NOTE — LETTER
2021         RE: Dee Dee Shepherd  [address]  [facility] [address]        Dear Colleague,    Thank you for referring your patient, Dee Dee Shepherd, to the [facility]. Please see a copy of my visit note below.    Dee Dee Shepherd  :  1955  DOS: 2020  MRN: [id number]    Sports Medicine Clinic Visit    PCP: Dalia Wu    Dee Dee Shepherd is a 63 year old female who is seen in follow-up for Dr Staples, at the request of Dr Wu presenting with chronic right hip and radiating low back pain.    Injury: Gradual onset of chronic radiating right hip and low back pain over the past 3 - 4 years, worsening over the last ~ 3 months.  Pain located over right deep anterior, lateral and posterior hip, right lower lumbar spine, radiating to lateral anterior thigh.  Reports intermittent radiating, burning pain to right lateral thigh.  Additional Features:  Positive: grinding, weakness and decreased hip ROM.  Symptoms are better with Tylenol and Rest.  Symptoms are worse with: going from sit to stand, walking, donning socks, lying on either side, heavy lifting.  Other evaluation and/or treatments so far consists of: Tylenol, Ibuprofen, Rest and PCP, Sports Med (Dr Staples), HEP.  Recent imaging completed: X-rays completed 3/24/17.  Prior History of related problems: Chronic right hip pain.  Patient was last seen by Dr Staples on 18, decided on trial of HEP at that time, which provided minimal relief.    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  Right hip intra-articular injection completed on 19 provided 1 month relief for 50%. Saw her GP and she suggested trying another injection.   No new injury in the interim.    Interim History - 2021  Gradual onset of pain over the past 2-3 year(s).  Pain located over  "right lateral hip and right groin, nonradiating.  Additional Features:  Positive: swelling in right lower leg, catching, popping and locking. Patient also complains of pain in right knee and low back. Symptoms are better with Rest and Sitting.  Symptoms are worse with: stairs and walking.  Other evaluation and/or treatments so far consists of: low back brace, icy hot.  Recent imaging completed: X-rays completed 3/24/17.  Prior History of related problems: Patient was previously seen in August 2020 for chronic right hip pain.    Review of Systems  Musculoskeletal: as above  Remainder of review of systems is negative including constitutional, CV, pulmonary, GI, Skin and Neurologic except as noted in HPI or medical history.    Past Medical History:   Diagnosis Date     Anxiety      Chronic low back pain      Depressive disorder      Hyperlipidemia      Hypertension      Malignant neoplasm of breast (female) (H)     right     Pap smear abnormality of cervix with cytologic evidence of malignancy      Tobacco abuse      Past Surgical History:   Procedure Laterality Date     C LIGATE FALLOPIAN TUBE       COLONOSCOPY N/A 5/3/2021    Procedure: Colonoscopy, With Polypectomy And Biopsy;  Surgeon: Ney Torrez MD;  Location: [facility]     MASTECTOMY       Objective  BP (!) 146/83   Ht 1.568 m (5' 1.75\")   Wt 85.3 kg (188 lb)   BMI 34.66 kg/m      General: healthy, alert and in no distress    HEENT: no scleral icterus or conjunctival erythema   Skin: no suspicious lesions or rash. No jaundice.   CV: regular rhythm by palpation, 2+ distal pulses, no pedal edema    Resp: normal respiratory effort without conversational dyspnea   Psych: normal mood and affect    Gait: antalgic, appropriate coordination and balance   Neuro: normal light touch sensory exam of the extremities. Motor strength as noted below     Right hip exam    Inspection:        no edema or ecchymosis in hip area    ROM:       Limited active and passive ROM with " flexion, extension, IR, ER, abduction    Strength:        Limited resisted hip flexion due to pain, mild b/l deconditioning of hip stabilizers in general    Non Tender:        remainder of hip area       illiac crest       ASIS    Sensation:        grossly intact in hip and thigh    Skin:       well perfused       capillary refill brisk    Special Tests:        equivocal MARQUISE       positive (+) FADIR       positive (+) scour       equivocal Denice       Neg SLR, slump for clear radicular sx    Radiology  XR PELVIS AND HIP RIGHT 1 VIEW  3/24/2017 4:15 PM     HISTORY:  Pain in right hip     COMPARISON:  None.                                                                   IMPRESSION:  Moderate to advanced degenerative changes of both hips.  Significant superolateral joint space narrowing bilaterally, left  greater than right. No acute process.     Large Joint Injection/Arthocentesis: R hip joint    Date/Time: 11/27/2021 8:37 AM  Performed by: Sukhdev Nunes DO  Authorized by: Sukhdev Nunes DO     Indications:  Pain and osteoarthritis  Needle Size:  22 G  Guidance: ultrasound    Approach:  Anterior  Location:  Hip      Site:  R hip joint  Medications:  40 mg triamcinolone 40 MG/ML; 3 mL ropivacaine 5 MG/ML; 4 mL lidocaine 1 %  Outcome:  Tolerated well, no immediate complications  Procedure discussed: discussed risks, benefits, and alternatives    Consent Given by:  Patient  Timeout: timeout called immediately prior to procedure    Prep: patient was prepped and draped in usual sterile fashion        Assessment:  1. Primary osteoarthritis of right hip        Plan:  Discussed the assessment with the patient.  Follow up: prn based on clinical progress  CT and XR images independently visualized and reviewed with patient today in clinic  US guide IA hip CSI last year gave good initial relief, and moderate relief for about 3-4 weeks  Low impact activity options reviewed   PT available anytime as  well  Planning SREE with Dr Hernandez when able, currently there is a backlog of arthroplasty patients  Expectations and goals of CSI reviewed  Often 2-3 days for steroid effect, and can take up to two weeks for maximum effect  We discussed modified progressive pain-free activity as tolerated  Do not overuse in first two weeks if feeling better due to concern for vulnerability while steroid is working  Supportive care reviewed  All questions were answered today  Contact us with additional questions or concerns  Signs and sx of concern reviewed      Sukhdev Nunes DO, CAQ  Primary Care Sports Medicine  [facility]           Disclaimer: This note consists of symbols derived from keyboarding, dictation and/or voice recognition software. As a result, there may be errors in the script that have gone undetected. Please consider this when interpreting information found in this chart.      Again, thank you for allowing me to participate in the care of your patient.        Sincerely,        Sukhdev Nunes DO

## 2021-11-27 NOTE — PROGRESS NOTES
Dee Dee Shepherd  :  1955  DOS: 2020  MRN: [id number]    Sports Medicine Clinic Visit    PCP: Dalia Wu    Dee Dee Shepherd is a 63 year old female who is seen in follow-up for Dr Staples, at the request of Dr Wu presenting with chronic right hip and radiating low back pain.    Injury: Gradual onset of chronic radiating right hip and low back pain over the past 3 - 4 years, worsening over the last ~ 3 months.  Pain located over right deep anterior, lateral and posterior hip, right lower lumbar spine, radiating to lateral anterior thigh.  Reports intermittent radiating, burning pain to right lateral thigh.  Additional Features:  Positive: grinding, weakness and decreased hip ROM.  Symptoms are better with Tylenol and Rest.  Symptoms are worse with: going from sit to stand, walking, donning socks, lying on either side, heavy lifting.  Other evaluation and/or treatments so far consists of: Tylenol, Ibuprofen, Rest and PCP, Sports Med (Dr Staples), HEP.  Recent imaging completed: X-rays completed 3/24/17.  Prior History of related problems: Chronic right hip pain.  Patient was last seen by Dr Staples on 18, decided on trial of HEP at that time, which provided minimal relief.    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  Right hip intra-articular injection completed on 19 provided 1 month relief for 50%. Saw her GP and she suggested trying another injection.   No new injury in the interim.    Interim History - 2021  Gradual onset of pain over the past 2-3 year(s).  Pain located over right lateral hip and right groin, nonradiating.  Additional Features:  Positive: swelling in right lower leg, catching, popping and locking. Patient also complains of pain in right knee and low back. Symptoms are better with Rest and Sitting.  Symptoms are worse with:  "stairs and walking.  Other evaluation and/or treatments so far consists of: low back brace, icy hot.  Recent imaging completed: X-rays completed 3/24/17.  Prior History of related problems: Patient was previously seen in August 2020 for chronic right hip pain.    Review of Systems  Musculoskeletal: as above  Remainder of review of systems is negative including constitutional, CV, pulmonary, GI, Skin and Neurologic except as noted in HPI or medical history.    Past Medical History:   Diagnosis Date     Anxiety      Chronic low back pain      Depressive disorder      Hyperlipidemia      Hypertension      Malignant neoplasm of breast (female) (H)     right     Pap smear abnormality of cervix with cytologic evidence of malignancy      Tobacco abuse      Past Surgical History:   Procedure Laterality Date     C LIGATE FALLOPIAN TUBE       COLONOSCOPY N/A 5/3/2021    Procedure: Colonoscopy, With Polypectomy And Biopsy;  Surgeon: Ney Torrez MD;  Location: [facility]     MASTECTOMY       Objective  BP (!) 146/83   Ht 1.568 m (5' 1.75\")   Wt 85.3 kg (188 lb)   BMI 34.66 kg/m      General: healthy, alert and in no distress    HEENT: no scleral icterus or conjunctival erythema   Skin: no suspicious lesions or rash. No jaundice.   CV: regular rhythm by palpation, 2+ distal pulses, no pedal edema    Resp: normal respiratory effort without conversational dyspnea   Psych: normal mood and affect    Gait: antalgic, appropriate coordination and balance   Neuro: normal light touch sensory exam of the extremities. Motor strength as noted below     Right hip exam    Inspection:        no edema or ecchymosis in hip area    ROM:       Limited active and passive ROM with flexion, extension, IR, ER, abduction    Strength:        Limited resisted hip flexion due to pain, mild b/l deconditioning of hip stabilizers in general    Non Tender:        remainder of hip area       illiac crest       ASIS    Sensation:        grossly intact in hip " and thigh    Skin:       well perfused       capillary refill brisk    Special Tests:        equivocal MARQUISE       positive (+) FADIR       positive (+) scour       equivocal Denice       Neg SLR, slump for clear radicular sx    Radiology  XR PELVIS AND HIP RIGHT 1 VIEW  3/24/2017 4:15 PM     HISTORY:  Pain in right hip     COMPARISON:  None.                                                                   IMPRESSION:  Moderate to advanced degenerative changes of both hips.  Significant superolateral joint space narrowing bilaterally, left  greater than right. No acute process.     Large Joint Injection/Arthocentesis: R hip joint    Date/Time: 11/27/2021 8:37 AM  Performed by: Sukhdev Nunes DO  Authorized by: Sukhdev Nunes DO     Indications:  Pain and osteoarthritis  Needle Size:  22 G  Guidance: ultrasound    Approach:  Anterior  Location:  Hip      Site:  R hip joint  Medications:  40 mg triamcinolone 40 MG/ML; 3 mL ropivacaine 5 MG/ML; 4 mL lidocaine 1 %  Outcome:  Tolerated well, no immediate complications  Procedure discussed: discussed risks, benefits, and alternatives    Consent Given by:  Patient  Timeout: timeout called immediately prior to procedure    Prep: patient was prepped and draped in usual sterile fashion        Assessment:  1. Primary osteoarthritis of right hip        Plan:  Discussed the assessment with the patient.  Follow up: prn based on clinical progress  CT and XR images independently visualized and reviewed with patient today in clinic  US guide IA hip CSI last year gave good initial relief, and moderate relief for about 3-4 weeks  Low impact activity options reviewed   PT available anytime as well  Planning SREE with Dr Hernandez when able, currently there is a backlog of arthroplasty patients  Expectations and goals of CSI reviewed  Often 2-3 days for steroid effect, and can take up to two weeks for maximum effect  We discussed modified progressive pain-free activity  as tolerated  Do not overuse in first two weeks if feeling better due to concern for vulnerability while steroid is working  Supportive care reviewed  All questions were answered today  Contact us with additional questions or concerns  Signs and sx of concern reviewed      Sukhdev Nunes DO, RENATE  Primary Care Sports Medicine  [facility]           Disclaimer: This note consists of symbols derived from keyboarding, dictation and/or voice recognition software. As a result, there may be errors in the script that have gone undetected. Please consider this when interpreting information found in this chart.

## 2021-12-13 ENCOUNTER — ANCILLARY PROCEDURE (OUTPATIENT)
Dept: CT IMAGING | Facility: CLINIC | Age: 66
End: 2021-12-13
Attending: FAMILY MEDICINE
Payer: COMMERCIAL

## 2021-12-13 DIAGNOSIS — Z87.891 PERSONAL HISTORY OF TOBACCO USE: ICD-10-CM

## 2021-12-13 PROCEDURE — 71271 CT THORAX LUNG CANCER SCR C-: CPT | Mod: TC | Performed by: RADIOLOGY

## 2021-12-30 DIAGNOSIS — E87.6 HYPOKALEMIA: ICD-10-CM

## 2021-12-30 DIAGNOSIS — F33.2 SEVERE EPISODE OF RECURRENT MAJOR DEPRESSIVE DISORDER, WITHOUT PSYCHOTIC FEATURES (H): ICD-10-CM

## 2021-12-31 RX ORDER — POTASSIUM CHLORIDE 1500 MG/1
TABLET, EXTENDED RELEASE ORAL
Qty: 90 TABLET | Refills: 0 | Status: SHIPPED | OUTPATIENT
Start: 2021-12-31 | End: 2022-06-06

## 2021-12-31 RX ORDER — SERTRALINE HYDROCHLORIDE 100 MG/1
TABLET, FILM COATED ORAL
Qty: 270 TABLET | Refills: 0 | Status: SHIPPED | OUTPATIENT
Start: 2021-12-31 | End: 2022-06-06

## 2021-12-31 NOTE — TELEPHONE ENCOUNTER
Routing refill request to provider for review/approval because:  Phq9=18  Jennifer Mcfarland RN

## 2022-01-02 DIAGNOSIS — F33.2 SEVERE EPISODE OF RECURRENT MAJOR DEPRESSIVE DISORDER, WITHOUT PSYCHOTIC FEATURES (H): ICD-10-CM

## 2022-01-02 NOTE — TELEPHONE ENCOUNTER
Routing aripiprazole refill request to Provider because it is not on the RN refill protocol.  Thank you.  Yosi Plummer, RN

## 2022-01-03 RX ORDER — ARIPIPRAZOLE 2 MG/1
TABLET ORAL
Qty: 180 TABLET | Refills: 0 | Status: SHIPPED | OUTPATIENT
Start: 2022-01-03 | End: 2022-04-29

## 2022-01-10 NOTE — TELEPHONE ENCOUNTER
PHQ 5/17/2021 11/16/2021 1/8/2022   PHQ-9 Total Score 7 18 12   Q9: Thoughts of better off dead/self-harm past 2 weeks Several days Not at all Not at all   F/U: Thoughts of suicide or self-harm - - -   F/U: Self harm-plan - - -   F/U: Self-harm action - - -   F/U: Safety concerns - - -     Yosi Plummer, RN

## 2022-01-31 ENCOUNTER — TELEPHONE (OUTPATIENT)
Dept: ORTHOPEDICS | Facility: CLINIC | Age: 67
End: 2022-01-31
Payer: COMMERCIAL

## 2022-02-01 ENCOUNTER — TELEPHONE (OUTPATIENT)
Dept: ORTHOPEDICS | Facility: CLINIC | Age: 67
End: 2022-02-01
Payer: COMMERCIAL

## 2022-02-04 ENCOUNTER — OFFICE VISIT (OUTPATIENT)
Dept: ORTHOPEDICS | Facility: CLINIC | Age: 67
End: 2022-02-04
Payer: COMMERCIAL

## 2022-02-04 VITALS
HEART RATE: 76 BPM | WEIGHT: 188 LBS | SYSTOLIC BLOOD PRESSURE: 174 MMHG | BODY MASS INDEX: 34.66 KG/M2 | DIASTOLIC BLOOD PRESSURE: 91 MMHG

## 2022-02-04 DIAGNOSIS — M16.11 PRIMARY OSTEOARTHRITIS OF RIGHT HIP: Primary | ICD-10-CM

## 2022-02-04 DIAGNOSIS — R29.898 DIFFICULTY BEARING WEIGHT ON RIGHT LOWER EXTREMITY: ICD-10-CM

## 2022-02-04 DIAGNOSIS — M25.551 CHRONIC RIGHT HIP PAIN: ICD-10-CM

## 2022-02-04 DIAGNOSIS — G89.29 CHRONIC RIGHT HIP PAIN: ICD-10-CM

## 2022-02-04 PROCEDURE — 99213 OFFICE O/P EST LOW 20 MIN: CPT | Performed by: FAMILY MEDICINE

## 2022-02-04 RX ORDER — TRAMADOL HYDROCHLORIDE 50 MG/1
25-50 TABLET ORAL EVERY 6 HOURS PRN
Qty: 10 TABLET | Refills: 0 | Status: SHIPPED | OUTPATIENT
Start: 2022-02-04 | End: 2022-02-07

## 2022-02-04 ASSESSMENT — PAIN SCALES - GENERAL: PAINLEVEL: EXTREME PAIN (8)

## 2022-02-04 NOTE — PROGRESS NOTES
Dee Dee Shepherd  :  1955  DOS: 2022  MRN: [id number]    Sports Medicine Clinic Visit    PCP: Dalia Wu    Dee Dee Shepherd is a 63 year old female who is seen in follow-up for Dr Staples, at the request of Dr Wu presenting with chronic right hip and radiating low back pain.    Injury: Gradual onset of chronic radiating right hip and low back pain over the past 3 - 4 years, worsening over the last ~ 3 months.  Pain located over right deep anterior, lateral and posterior hip, right lower lumbar spine, radiating to lateral anterior thigh.  Reports intermittent radiating, burning pain to right lateral thigh.  Additional Features:  Positive: grinding, weakness and decreased hip ROM.  Symptoms are better with Tylenol and Rest.  Symptoms are worse with: going from sit to stand, walking, donning socks, lying on either side, heavy lifting.  Other evaluation and/or treatments so far consists of: Tylenol, Ibuprofen, Rest and PCP, Sports Med (Dr Staples), HEP.  Recent imaging completed: X-rays completed 3/24/17.  Prior History of related problems: Chronic right hip pain.  Patient was last seen by Dr Staples on 18, decided on trial of HEP at that time, which provided minimal relief.    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  Right hip intra-articular injection completed on 19 provided 1 month relief for 50%. Saw her GP and she suggested trying another injection.   No new injury in the interim.    Interim History - 2021  Gradual onset of pain over the past 2-3 year(s).  Pain located over right lateral hip and right groin, nonradiating.  Additional Features:  Positive: swelling in right lower leg, catching, popping and locking. Patient also complains of pain in right knee and low back. Symptoms are better with Rest and Sitting.  Symptoms are worse with:  stairs and walking.  Other evaluation and/or treatments so far consists of: low back brace, icy hot.  Recent imaging completed: X-rays completed 3/24/17.  Prior History of related problems: Patient was previously seen in August 2020 for chronic right hip pain.    Interim History - February 4, 2022  Since last visit on 11/27/2021, patient has noticed moderate-severe right hip pain has returned.  Right hip corticosteroid injection completed on 11/27/2021 provided 80% relief for 3 weeks. Most painful with sit to stand transition and ambulation and is located over the lateral right hip and medial upper leg. Denies groin pain at this time. She is currently taking OTC pain medications with minimal relief. No new injury in the interim.      Review of Systems  Musculoskeletal: as above  Remainder of review of systems is negative including constitutional, CV, pulmonary, GI, Skin and Neurologic except as noted in HPI or medical history.    Past Medical History:   Diagnosis Date     Anxiety      Chronic low back pain      Depressive disorder      Hyperlipidemia      Hypertension      Malignant neoplasm of breast (female) (H)     right     Pap smear abnormality of cervix with cytologic evidence of malignancy      Tobacco abuse      Past Surgical History:   Procedure Laterality Date     COLONOSCOPY N/A 5/3/2021    Procedure: Colonoscopy, With Polypectomy And Biopsy;  Surgeon: Ney Torrez MD;  Location: [facility]     MASTECTOMY       ZZC LIGATE FALLOPIAN TUBE       Objective  BP (!) 174/91   Pulse 76   Wt 85.3 kg (188 lb)   BMI 34.66 kg/m      General: healthy, alert and in no distress    HEENT: no scleral icterus or conjunctival erythema   Skin: no suspicious lesions or rash. No jaundice.   CV: regular rhythm by palpation, 2+ distal pulses, no pedal edema    Resp: normal respiratory effort without conversational dyspnea   Psych: normal mood and affect    Gait: antalgic, appropriate coordination and balance   Neuro: normal  light touch sensory exam of the extremities. Motor strength as noted below     Right hip exam    Inspection:        no edema or ecchymosis in hip area    ROM:       Limited active and passive ROM with flexion, extension, IR, ER, abduction    Strength:        Limited resisted hip flexion due to pain, mild b/l deconditioning of hip stabilizers in general       Increased TTP today in the lateral hip/greater trochanter    Non Tender:        remainder of hip area       illiac crest       ASIS    Sensation:        grossly intact in hip and thigh    Skin:       well perfused       capillary refill brisk    Special Tests:        equivocal MARQUISE       positive (+) FADIR       positive (+) scour       equivocal Dencie    Radiology  XR PELVIS AND HIP RIGHT 1 VIEW  3/24/2017 4:15 PM     HISTORY:  Pain in right hip     COMPARISON:  None.                                                                   IMPRESSION:  Moderate to advanced degenerative changes of both hips.  Significant superolateral joint space narrowing bilaterally, left  greater than right. No acute process.       Assessment:  1. Primary osteoarthritis of right hip    2. Chronic right hip pain    3. Difficulty bearing weight on right lower extremity        Plan:  Discussed the assessment with the patient.  Follow up: prn based on clinical progress  CT and XR images independently visualized and reviewed with patient today in clinic  US guide IA hip CSI last visit gave good initial relief, and moderate relief for about 3 weeks  Option for troch bursa CSI for lateral hip pain reviewed, defer for now  Will avoid repeat CSI given that she is hopeful for SREE soon, was scheduled 9/2021 but was cancelled due to COVID  Low impact activity options reviewed   PT available anytime as well  Tramadol rx for severe breakthrough pain provided, reviewed risks and limitations, no abuse concerns  Planning SREE with Dr Hernandez when able, currently there is a backlog of arthroplasty  patients  Assistive device use options reviewed  Supportive care reviewed  All questions were answered today  Contact us with additional questions or concerns  Signs and sx of concern reviewed      Sukhdev Nunes DO, RENATE  Primary Care Sports Medicine  [facility]           Disclaimer: This note consists of symbols derived from keyboarding, dictation and/or voice recognition software. As a result, there may be errors in the script that have gone undetected. Please consider this when interpreting information found in this chart.

## 2022-02-04 NOTE — LETTER
2022         RE: Dee Dee Shepherd  [address]  [facility] [address]        Dear Colleague,    Thank you for referring your patient, Dee Dee Shepherd, to the [facility]. Please see a copy of my visit note below.    Dee Dee Shepherd  :  1955  DOS: 2022  MRN: [id number]    Sports Medicine Clinic Visit    PCP: Dalia Wu    Dee Dee Shepherd is a 63 year old female who is seen in follow-up for Dr Staples, at the request of Dr Wu presenting with chronic right hip and radiating low back pain.    Injury: Gradual onset of chronic radiating right hip and low back pain over the past 3 - 4 years, worsening over the last ~ 3 months.  Pain located over right deep anterior, lateral and posterior hip, right lower lumbar spine, radiating to lateral anterior thigh.  Reports intermittent radiating, burning pain to right lateral thigh.  Additional Features:  Positive: grinding, weakness and decreased hip ROM.  Symptoms are better with Tylenol and Rest.  Symptoms are worse with: going from sit to stand, walking, donning socks, lying on either side, heavy lifting.  Other evaluation and/or treatments so far consists of: Tylenol, Ibuprofen, Rest and PCP, Sports Med (Dr Staples), HEP.  Recent imaging completed: X-rays completed 3/24/17.  Prior History of related problems: Chronic right hip pain.  Patient was last seen by Dr Staples on 18, decided on trial of HEP at that time, which provided minimal relief.    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  Right hip intra-articular injection completed on 19 provided 1 month relief for 50%. Saw her GP and she suggested trying another injection.   No new injury in the interim.    Interim History - 2021  Gradual onset of pain over the past 2-3 year(s).  Pain located over  right lateral hip and right groin, nonradiating.  Additional Features:  Positive: swelling in right lower leg, catching, popping and locking. Patient also complains of pain in right knee and low back. Symptoms are better with Rest and Sitting.  Symptoms are worse with: stairs and walking.  Other evaluation and/or treatments so far consists of: low back brace, icy hot.  Recent imaging completed: X-rays completed 3/24/17.  Prior History of related problems: Patient was previously seen in August 2020 for chronic right hip pain.    Interim History - February 4, 2022  Since last visit on 11/27/2021, patient has noticed moderate-severe right hip pain has returned.  Right hip corticosteroid injection completed on 11/27/2021 provided 80% relief for 3 weeks. Most painful with sit to stand transition and ambulation and is located over the lateral right hip and medial upper leg. Denies groin pain at this time. She is currently taking OTC pain medications with minimal relief. No new injury in the interim.      Review of Systems  Musculoskeletal: as above  Remainder of review of systems is negative including constitutional, CV, pulmonary, GI, Skin and Neurologic except as noted in HPI or medical history.    Past Medical History:   Diagnosis Date     Anxiety      Chronic low back pain      Depressive disorder      Hyperlipidemia      Hypertension      Malignant neoplasm of breast (female) (H)     right     Pap smear abnormality of cervix with cytologic evidence of malignancy      Tobacco abuse      Past Surgical History:   Procedure Laterality Date     COLONOSCOPY N/A 5/3/2021    Procedure: Colonoscopy, With Polypectomy And Biopsy;  Surgeon: Ney Torrez MD;  Location: [facility]     MASTECTOMY       [facility] LIGATE FALLOPIAN TUBE       Objective  BP (!) 174/91   Pulse 76   Wt 85.3 kg (188 lb)   BMI 34.66 kg/m      General: healthy, alert and in no distress    HEENT: no scleral icterus or conjunctival erythema   Skin: no suspicious  lesions or rash. No jaundice.   CV: regular rhythm by palpation, 2+ distal pulses, no pedal edema    Resp: normal respiratory effort without conversational dyspnea   Psych: normal mood and affect    Gait: antalgic, appropriate coordination and balance   Neuro: normal light touch sensory exam of the extremities. Motor strength as noted below     Right hip exam    Inspection:        no edema or ecchymosis in hip area    ROM:       Limited active and passive ROM with flexion, extension, IR, ER, abduction    Strength:        Limited resisted hip flexion due to pain, mild b/l deconditioning of hip stabilizers in general       Increased TTP today in the lateral hip/greater trochanter    Non Tender:        remainder of hip area       illiac crest       ASIS    Sensation:        grossly intact in hip and thigh    Skin:       well perfused       capillary refill brisk    Special Tests:        equivocal MARQUISE       positive (+) FADIR       positive (+) scour       equivocal Denice    Radiology  XR PELVIS AND HIP RIGHT 1 VIEW  3/24/2017 4:15 PM     HISTORY:  Pain in right hip     COMPARISON:  None.                                                                   IMPRESSION:  Moderate to advanced degenerative changes of both hips.  Significant superolateral joint space narrowing bilaterally, left  greater than right. No acute process.       Assessment:  1. Primary osteoarthritis of right hip    2. Chronic right hip pain    3. Difficulty bearing weight on right lower extremity        Plan:  Discussed the assessment with the patient.  Follow up: prn based on clinical progress  CT and XR images independently visualized and reviewed with patient today in clinic  US guide IA hip CSI last visit gave good initial relief, and moderate relief for about 3 weeks  Option for troch bursa CSI for lateral hip pain reviewed, defer for now  Will avoid repeat CSI given that she is hopeful for SREE soon, was scheduled 9/2021 but was cancelled due  to COVID  Low impact activity options reviewed   PT available anytime as well  Tramadol rx for severe breakthrough pain provided, reviewed risks and limitations, no abuse concerns  Planning SREE with Dr Hernandez when able, currently there is a backlog of arthroplasty patients  Assistive device use options reviewed  Supportive care reviewed  All questions were answered today  Contact us with additional questions or concerns  Signs and sx of concern reviewed      Sukhdev Nunes DO, CAERIN  Primary Care Sports Medicine  [facility]           Disclaimer: This note consists of symbols derived from keyboarding, dictation and/or voice recognition software. As a result, there may be errors in the script that have gone undetected. Please consider this when interpreting information found in this chart.      Again, thank you for allowing me to participate in the care of your patient.        Sincerely,        Sukhdev Nunes DO

## 2022-02-07 ENCOUNTER — HOSPITAL ENCOUNTER (OUTPATIENT)
Facility: CLINIC | Age: 67
End: 2022-02-07
Attending: ORTHOPAEDIC SURGERY | Admitting: ORTHOPAEDIC SURGERY
Payer: COMMERCIAL

## 2022-02-08 DIAGNOSIS — Z11.59 ENCOUNTER FOR SCREENING FOR OTHER VIRAL DISEASES: Primary | ICD-10-CM

## 2022-03-11 ENCOUNTER — TELEPHONE (OUTPATIENT)
Dept: ORTHOPEDICS | Facility: CLINIC | Age: 67
End: 2022-03-11
Payer: COMMERCIAL

## 2022-03-11 ENCOUNTER — TELEPHONE (OUTPATIENT)
Dept: FAMILY MEDICINE | Facility: CLINIC | Age: 67
End: 2022-03-11
Payer: COMMERCIAL

## 2022-03-11 DIAGNOSIS — M25.551 CHRONIC RIGHT HIP PAIN: ICD-10-CM

## 2022-03-11 DIAGNOSIS — G89.29 CHRONIC RIGHT HIP PAIN: ICD-10-CM

## 2022-03-11 DIAGNOSIS — M16.11 PRIMARY OSTEOARTHRITIS OF RIGHT HIP: Primary | ICD-10-CM

## 2022-03-11 NOTE — TELEPHONE ENCOUNTER
Panel Management Review      Patient has the following on her problem list:     Depression / Dysthymia review    Measure:  Needs PHQ-9 score of 4 or less during index window.  Administer PHQ-9 and if score is 5 or more, send encounter to provider for next steps.    5 - 7 month window range:     PHQ-9 SCORE 5/17/2021 11/16/2021 1/8/2022   PHQ-9 Total Score MyChart - - 12 (Moderate depression)   PHQ-9 Total Score 7 18 12       If PHQ-9 recheck is 5 or more, route to provider for next steps.    Patient is due for:  PHQ9    Hypertension   Last three blood pressure readings:  BP Readings from Last 3 Encounters:   02/04/22 (!) 174/91   11/27/21 (!) 146/83   11/16/21 122/82     Blood pressure: FAILED    HTN Guidelines:  Less than 140/90      Composite cancer screening  Chart review shows that this patient is due/due soon for the following None  Summary:    Patient is due/failing the following:   BP CHECK and PHQ9    Action needed:   Patient needs to do PHQ9. and Patient needs nurse only appointment.    Type of outreach:    Sent [employer] message.    Questions for provider review:    None                                                                                                                                    Karlene Henriquez CMA       Chart routed to none .

## 2022-03-11 NOTE — TELEPHONE ENCOUNTER
Patient requesting a refill of Tramadol. Surgery not set up until June.  OTC medications not helping.     Please advise.  Marcia Pretty MS ATC

## 2022-03-14 RX ORDER — TRAMADOL HYDROCHLORIDE 50 MG/1
25-50 TABLET ORAL EVERY 6 HOURS PRN
Qty: 16 TABLET | Refills: 0 | Status: SHIPPED | OUTPATIENT
Start: 2022-03-14 | End: 2022-03-17

## 2022-03-14 NOTE — TELEPHONE ENCOUNTER
rx filled, please notify patient, thanks!    Sukhdev Nunes DO, RENATE  Sports Medicine Physician  [facility]

## 2022-03-14 NOTE — TELEPHONE ENCOUNTER
Spoke with patient and relayed that Rx of Tramadol refilled at [facility] in [place]. No further questions. Jessica Abreu, ATC

## 2022-03-18 ENCOUNTER — TELEPHONE (OUTPATIENT)
Dept: ORTHOPEDICS | Facility: CLINIC | Age: 67
End: 2022-03-18
Payer: COMMERCIAL

## 2022-03-18 NOTE — TELEPHONE ENCOUNTER
Patient scheduled for surgery with Dr. Akira Hernandez for Right SREE. She is inquiring about getting a handicap parking form at this time. Please advise. Jessica Abreu ATC

## 2022-03-18 NOTE — TELEPHONE ENCOUNTER
Spoke with patient. She will  the handicap parking form on Monday, 3/21/2022 at the [facility] location. Jessica Abreu ATC

## 2022-03-18 NOTE — TELEPHONE ENCOUNTER
Patient has questions about how to obtain a handicapped parking. Please call back today.    [phone]    Yaritza

## 2022-04-28 DIAGNOSIS — E78.5 HYPERLIPIDEMIA LDL GOAL <130: ICD-10-CM

## 2022-04-28 DIAGNOSIS — F33.2 SEVERE EPISODE OF RECURRENT MAJOR DEPRESSIVE DISORDER, WITHOUT PSYCHOTIC FEATURES (H): ICD-10-CM

## 2022-04-28 DIAGNOSIS — I10 ESSENTIAL HYPERTENSION WITH GOAL BLOOD PRESSURE LESS THAN 140/90: ICD-10-CM

## 2022-04-28 RX ORDER — SIMVASTATIN 40 MG
TABLET ORAL
Qty: 90 TABLET | Refills: 1 | Status: SHIPPED | OUTPATIENT
Start: 2022-04-28 | End: 2023-07-12

## 2022-04-28 NOTE — TELEPHONE ENCOUNTER
Routing refill request to provider for review/approval because:  Last recorded blood pressure does not meet RN protocol parameters    Jona Bernard RN

## 2022-04-29 RX ORDER — LISINOPRIL 10 MG/1
TABLET ORAL
Qty: 90 TABLET | Refills: 0 | Status: SHIPPED | OUTPATIENT
Start: 2022-04-29 | End: 2022-05-31 | Stop reason: DRUGHIGH

## 2022-04-29 RX ORDER — ARIPIPRAZOLE 2 MG/1
TABLET ORAL
Qty: 180 TABLET | Refills: 0 | Status: SHIPPED | OUTPATIENT
Start: 2022-04-29 | End: 2022-05-19

## 2022-04-30 DIAGNOSIS — M25.551 CHRONIC RIGHT HIP PAIN: ICD-10-CM

## 2022-04-30 DIAGNOSIS — M16.11 PRIMARY OSTEOARTHRITIS OF RIGHT HIP: Primary | ICD-10-CM

## 2022-04-30 DIAGNOSIS — G89.29 CHRONIC RIGHT HIP PAIN: ICD-10-CM

## 2022-04-30 NOTE — TELEPHONE ENCOUNTER
Medication Request for: Tramadol HCL 50mg Tablet            Patient currently taking: as needed  Patient has unknown of medication remaining.  Patient is also taking OTC: none - no relief with OTC pain medications    Problems/ Concerns of Patient: no side effects reported  Prescription last written on 3/14/22 by Dr Nunes  Sig: Take 0.5-1 tablets (25-50 mg) by mouth every 6 hours as needed for severe pain - Oral   Last Fill Quantity: 16 with # refills: 0     Last Office Visit by provider: 2/4/22  Date of Surgery (if applicable): 6/10/22   Procedure Performed (if applicable): Scheduled for total hip arthroplasty with Dr Hernandez @ [facility]  Future Office Visit:   n/a  Patient desires to have faxed or E-prescribe to pharmacy if available  Pharmacy selected in [place].    Phone number patient can be reached at: Cell number on file:    Telephone Information:   Mobile [phone]       Can we leave a detailed message on this number? YES    Medication requests may take up to 3 business days for a response  Has patient been notified of this No    Robert Driver ATC

## 2022-05-02 DIAGNOSIS — F33.2 SEVERE EPISODE OF RECURRENT MAJOR DEPRESSIVE DISORDER, WITHOUT PSYCHOTIC FEATURES (H): ICD-10-CM

## 2022-05-02 NOTE — TELEPHONE ENCOUNTER
Routing refill request to provider for review/approval because:  Please see message from pharmacy     Jona Bernard, RN

## 2022-05-03 RX ORDER — TRAMADOL HYDROCHLORIDE 50 MG/1
25-50 TABLET ORAL EVERY 6 HOURS PRN
Qty: 16 TABLET | Refills: 0 | Status: SHIPPED | OUTPATIENT
Start: 2022-05-03 | End: 2022-05-07

## 2022-05-03 NOTE — TELEPHONE ENCOUNTER
Rx refilled, please notify patient.  Continue to use as little as possible, for breakthrough pain.    Sukhdev Nunes DO, CAQ  Sports Medicine Physician  [facility]

## 2022-05-03 NOTE — TELEPHONE ENCOUNTER
Please call pt.  Insurance does not want to cover 2 tablets daily.  We could try sending the 5mg tablet over which would be a small dose increase.  Please let me know if she is okay with that    Dalia Wu, DO

## 2022-05-03 NOTE — TELEPHONE ENCOUNTER
Spoke to patient discussed the Rx refilled provided and sent to the pharmacy.      Robert Driver, ATC

## 2022-05-05 RX ORDER — ARIPIPRAZOLE 2 MG/1
TABLET ORAL
Qty: 180 TABLET | Refills: 0 | OUTPATIENT
Start: 2022-05-05

## 2022-05-05 RX ORDER — ARIPIPRAZOLE 5 MG/1
5 TABLET ORAL DAILY
Qty: 90 TABLET | Refills: 0 | Status: SHIPPED | OUTPATIENT
Start: 2022-05-05 | End: 2022-08-01

## 2022-05-05 NOTE — TELEPHONE ENCOUNTER
Call placed to patient  Relayed Dr. Wu's message    Patient verbalized understanding  Okay with increasing to 5mg daily   No further questions/concerns    Jona Bernard RN

## 2022-05-17 DIAGNOSIS — F33.2 SEVERE EPISODE OF RECURRENT MAJOR DEPRESSIVE DISORDER, WITHOUT PSYCHOTIC FEATURES (H): ICD-10-CM

## 2022-05-18 NOTE — TELEPHONE ENCOUNTER
Routing refill request to provider for review/approval because:  Drug not on the [facility] refill protocol   Thank you.  Yosi Plummer RN

## 2022-05-19 RX ORDER — ARIPIPRAZOLE 5 MG/1
TABLET ORAL
Qty: 90 TABLET | Refills: 0 | OUTPATIENT
Start: 2022-05-19

## 2022-05-24 ENCOUNTER — TELEPHONE (OUTPATIENT)
Dept: FAMILY MEDICINE | Facility: CLINIC | Age: 67
End: 2022-05-24
Payer: COMMERCIAL

## 2022-05-24 NOTE — TELEPHONE ENCOUNTER
"Reached out to pt regarding her mood and PHQ9.  Feels like her mood and attention span have suffered due to her hip pain.  Can't stand or sit for long periods and gets woken up when she tries to sleep due to pain    Has thought so \"I can't deal with this\" but not actually thoughts of suicide.    Does not feel like she is in danger.  No plans for self harm    Feels like she gets stressed and lonesome.  Lost her pets over the last year and the hip is a big problem.  Notes that she gets down on herself.    Does not feel she is in any danger.  Has already rescheduled her appointment and will discuss more then.    Dalia Wu, DO        "

## 2022-05-27 ASSESSMENT — PATIENT HEALTH QUESTIONNAIRE - PHQ9
SUM OF ALL RESPONSES TO PHQ QUESTIONS 1-9: 21
SUM OF ALL RESPONSES TO PHQ QUESTIONS 1-9: 21
10. IF YOU CHECKED OFF ANY PROBLEMS, HOW DIFFICULT HAVE THESE PROBLEMS MADE IT FOR YOU TO DO YOUR WORK, TAKE CARE OF THINGS AT HOME, OR GET ALONG WITH OTHER PEOPLE: VERY DIFFICULT

## 2022-05-27 ASSESSMENT — ENCOUNTER SYMPTOMS
NAUSEA: 0
MYALGIAS: 0
SORE THROAT: 0
ABDOMINAL PAIN: 0
ARTHRALGIAS: 1
COUGH: 0
CONSTIPATION: 1
HEMATURIA: 0
HEARTBURN: 0
FEVER: 0
PALPITATIONS: 0
WEAKNESS: 1
SHORTNESS OF BREATH: 0
FREQUENCY: 1
HEADACHES: 0
HEMATOCHEZIA: 0
BREAST MASS: 0
DIZZINESS: 0
NERVOUS/ANXIOUS: 1
JOINT SWELLING: 1
DIARRHEA: 0
CHILLS: 0
PARESTHESIAS: 0
EYE PAIN: 0
DYSURIA: 0

## 2022-05-27 ASSESSMENT — ACTIVITIES OF DAILY LIVING (ADL): CURRENT_FUNCTION: NO ASSISTANCE NEEDED

## 2022-05-31 ENCOUNTER — OFFICE VISIT (OUTPATIENT)
Dept: FAMILY MEDICINE | Facility: CLINIC | Age: 67
End: 2022-05-31
Payer: COMMERCIAL

## 2022-05-31 VITALS
OXYGEN SATURATION: 99 % | DIASTOLIC BLOOD PRESSURE: 100 MMHG | SYSTOLIC BLOOD PRESSURE: 184 MMHG | WEIGHT: 192.6 LBS | TEMPERATURE: 97.1 F | BODY MASS INDEX: 35.51 KG/M2 | HEART RATE: 79 BPM

## 2022-05-31 DIAGNOSIS — F33.2 SEVERE EPISODE OF RECURRENT MAJOR DEPRESSIVE DISORDER, WITHOUT PSYCHOTIC FEATURES (H): ICD-10-CM

## 2022-05-31 DIAGNOSIS — I10 ESSENTIAL HYPERTENSION WITH GOAL BLOOD PRESSURE LESS THAN 140/90: ICD-10-CM

## 2022-05-31 DIAGNOSIS — Z01.818 PREOP GENERAL PHYSICAL EXAM: Primary | ICD-10-CM

## 2022-05-31 DIAGNOSIS — Z23 HIGH PRIORITY FOR 2019-NCOV VACCINE: ICD-10-CM

## 2022-05-31 DIAGNOSIS — Z12.31 ENCOUNTER FOR SCREENING MAMMOGRAM FOR BREAST CANCER: ICD-10-CM

## 2022-05-31 DIAGNOSIS — M16.11 PRIMARY OSTEOARTHRITIS OF RIGHT HIP: ICD-10-CM

## 2022-05-31 DIAGNOSIS — Z00.00 ENCOUNTER FOR MEDICARE ANNUAL WELLNESS EXAM: ICD-10-CM

## 2022-05-31 DIAGNOSIS — E66.01 MORBID OBESITY (H): ICD-10-CM

## 2022-05-31 LAB
ANION GAP SERPL CALCULATED.3IONS-SCNC: 7 MMOL/L (ref 3–14)
BUN SERPL-MCNC: 13 MG/DL (ref 7–30)
CALCIUM SERPL-MCNC: 9.1 MG/DL (ref 8.5–10.1)
CHLORIDE BLD-SCNC: 108 MMOL/L (ref 94–109)
CO2 SERPL-SCNC: 24 MMOL/L (ref 20–32)
CREAT SERPL-MCNC: 0.71 MG/DL (ref 0.52–1.04)
ERYTHROCYTE [DISTWIDTH] IN BLOOD BY AUTOMATED COUNT: 13.1 % (ref 10–15)
GFR SERPL CREATININE-BSD FRML MDRD: >90 ML/MIN/1.73M2
GLUCOSE BLD-MCNC: 96 MG/DL (ref 70–99)
HCT VFR BLD AUTO: 41.1 % (ref 35–47)
HGB BLD-MCNC: 14 G/DL (ref 11.7–15.7)
MCH RBC QN AUTO: 33.4 PG (ref 26.5–33)
MCHC RBC AUTO-ENTMCNC: 34.1 G/DL (ref 31.5–36.5)
MCV RBC AUTO: 98 FL (ref 78–100)
PLATELET # BLD AUTO: 226 10E3/UL (ref 150–450)
POTASSIUM BLD-SCNC: 3.7 MMOL/L (ref 3.4–5.3)
RBC # BLD AUTO: 4.19 10E6/UL (ref 3.8–5.2)
SODIUM SERPL-SCNC: 139 MMOL/L (ref 133–144)
WBC # BLD AUTO: 6.8 10E3/UL (ref 4–11)

## 2022-05-31 PROCEDURE — 36415 COLL VENOUS BLD VENIPUNCTURE: CPT | Performed by: FAMILY MEDICINE

## 2022-05-31 PROCEDURE — G0009 ADMIN PNEUMOCOCCAL VACCINE: HCPCS | Performed by: FAMILY MEDICINE

## 2022-05-31 PROCEDURE — 99214 OFFICE O/P EST MOD 30 MIN: CPT | Mod: 25 | Performed by: FAMILY MEDICINE

## 2022-05-31 PROCEDURE — 93000 ELECTROCARDIOGRAM COMPLETE: CPT | Performed by: FAMILY MEDICINE

## 2022-05-31 PROCEDURE — 85027 COMPLETE CBC AUTOMATED: CPT | Performed by: FAMILY MEDICINE

## 2022-05-31 PROCEDURE — 99397 PER PM REEVAL EST PAT 65+ YR: CPT | Mod: 25 | Performed by: FAMILY MEDICINE

## 2022-05-31 PROCEDURE — 91305 COVID-19,PF,PFIZER (12+ YRS): CPT | Performed by: FAMILY MEDICINE

## 2022-05-31 PROCEDURE — 90732 PPSV23 VACC 2 YRS+ SUBQ/IM: CPT | Performed by: FAMILY MEDICINE

## 2022-05-31 PROCEDURE — 80048 BASIC METABOLIC PNL TOTAL CA: CPT | Performed by: FAMILY MEDICINE

## 2022-05-31 PROCEDURE — 0054A COVID-19,PF,PFIZER (12+ YRS): CPT | Performed by: FAMILY MEDICINE

## 2022-05-31 RX ORDER — TRAMADOL HYDROCHLORIDE 50 MG/1
50 TABLET ORAL EVERY 6 HOURS PRN
COMMUNITY
End: 2023-03-14

## 2022-05-31 RX ORDER — LISINOPRIL 40 MG/1
40 TABLET ORAL DAILY
Qty: 30 TABLET | Refills: 0 | Status: SHIPPED | OUTPATIENT
Start: 2022-05-31 | End: 2022-06-06

## 2022-05-31 ASSESSMENT — ENCOUNTER SYMPTOMS
NERVOUS/ANXIOUS: 1
DIZZINESS: 0
HEMATOCHEZIA: 0
FREQUENCY: 1
PALPITATIONS: 0
MYALGIAS: 0
EYE PAIN: 0
BREAST MASS: 0
ARTHRALGIAS: 1
HEADACHES: 0
COUGH: 0
FEVER: 0
HEMATURIA: 0
HEARTBURN: 0
CHILLS: 0
PARESTHESIAS: 0
WEAKNESS: 1
JOINT SWELLING: 1
SORE THROAT: 0
SHORTNESS OF BREATH: 0
DIARRHEA: 0
NAUSEA: 0
CONSTIPATION: 1
DYSURIA: 0
ABDOMINAL PAIN: 0

## 2022-05-31 ASSESSMENT — ACTIVITIES OF DAILY LIVING (ADL): CURRENT_FUNCTION: NO ASSISTANCE NEEDED

## 2022-05-31 NOTE — NURSING NOTE
"Initial BP (!) 200/108   Pulse 79   Temp 97.1  F (36.2  C) (Tympanic)   Wt 87.4 kg (192 lb 9.6 oz)   SpO2 99%   Breastfeeding No   BMI 35.51 kg/m   Estimated body mass index is 35.51 kg/m  as calculated from the following:    Height as of 11/27/21: 1.568 m (5' 1.75\").    Weight as of this encounter: 87.4 kg (192 lb 9.6 oz). .      "

## 2022-05-31 NOTE — PROGRESS NOTES
"SUBJECTIVE:   Dee Dee Shepherd is a 66 year old female who presents for Preventive Visit.      Patient has been advised of split billing requirements and indicates understanding: Yes  Are you in the first 12 months of your Medicare coverage?  No    Healthy Habits:     In general, how would you rate your overall health?  Fair    Frequency of exercise:  None    Do you usually eat at least 4 servings of fruit and vegetables a day, include whole grains    & fiber and avoid regularly eating high fat or \"junk\" foods?  No    Taking medications regularly:  Yes    Medication side effects:  None    Ability to successfully perform activities of daily living:  No assistance needed    Home Safety:  No safety concerns identified    Hearing Impairment:  No hearing concerns    In the past 6 months, have you been bothered by leaking of urine? Yes    In general, how would you rate your overall mental or emotional health?  Fair      PHQ-2 Total Score: 5    Additional concerns today:  No    Do you feel safe in your environment? Yes    Have you ever done Advance Care Planning? (For example, a Health Directive, POLST, or a discussion with a medical provider or your loved ones about your wishes):        Fall risk  Fallen 2 or more times in the past year?: No  Any fall with injury in the past year?: Yes    Cognitive Screening   1) Repeat 3 items (Leader, Season, Table)    2) Clock draw: NORMAL  3) 3 item recall: Recalls 1 object   Results: NORMAL clock, 1-2 items recalled: COGNITIVE IMPAIRMENT LESS LIKELY    Mini-CogTM Copyright MALLIKA [employer]. Licensed by the author for use in [facility]; reprinted with permission ([email or web address]@.[email or web address]). All rights reserved.          Reviewed and updated as needed this visit by clinical staff   Tobacco  Allergies  Meds   Med Hx  Surg Hx  Fam Hx  Soc Hx          Reviewed and updated as needed this visit by Provider   Tobacco  Allergies  Meds   Med Hx  Surg Hx  Fam Hx  Soc Hx         Social " History     Tobacco Use     Smoking status: Current Every Day Smoker     Packs/day: 0.50     Years: 35.00     Pack years: 17.50     Types: Cigarettes     Smokeless tobacco: Never Used   Substance Use Topics     Alcohol use: Yes     Comment: weekends drinking more 3-5 drinks         Alcohol Use 5/27/2022   Prescreen: >3 drinks/day or >7 drinks/week? No   Prescreen: >3 drinks/day or >7 drinks/week? -         Current providers sharing in care for this patient include:   Patient Care Team:  Dalia Wu DO as PCP - General (Family Practice)  Sukhdev Nunes DO as Assigned Musculoskeletal Provider  Dalia Wu DO as Assigned PCP    The following health maintenance items are reviewed in Epic and correct as of today:  Health Maintenance Due   Topic Date Due     URINE DRUG SCREEN  Never done     ANNUAL REVIEW OF HM ORDERS  Never done         FHS-7:   Breast CA Risk Assessment (FHS-7) 11/16/2021 5/23/2022 5/27/2022   Did any of your first-degree relatives have breast or ovarian cancer? Yes Yes Yes   Did any of your relatives have bilateral breast cancer? Yes Yes Yes   Did any man in your family have breast cancer? No No No   Did any woman in your family have breast and ovarian cancer? Yes Yes Yes   Did any woman in your family have breast cancer before age 50 y? No Unknown No   Do you have 2 or more relatives with breast and/or ovarian cancer? Yes Yes Yes   Do you have 2 or more relatives with breast and/or bowel cancer? Yes Yes Yes       Pertinent mammograms are reviewed under the imaging tab.    Review of Systems   Constitutional: Negative for chills and fever.   HENT: Negative for congestion, ear pain, hearing loss and sore throat.    Eyes: Negative for pain and visual disturbance.   Respiratory: Negative for cough and shortness of breath.    Cardiovascular: Positive for peripheral edema. Negative for chest pain and palpitations.   Gastrointestinal: Positive for constipation. Negative for abdominal  "pain, diarrhea, heartburn, hematochezia and nausea.   Breasts:  Negative for tenderness, breast mass and discharge.   Genitourinary: Positive for frequency and urgency. Negative for dysuria, genital sores, hematuria, pelvic pain, vaginal bleeding and vaginal discharge.   Musculoskeletal: Positive for arthralgias and joint swelling. Negative for myalgias.   Skin: Negative for rash.   Neurological: Positive for weakness. Negative for dizziness, headaches and paresthesias.   Psychiatric/Behavioral: Positive for mood changes. The patient is nervous/anxious.      Mood:  Feels mood has been worse the \"past few months\".  Just has a \"case of the blues\".  Feels \"not right emotionally\".  Sister recently diagnosed with breast cancer.  Feels \"out of sorts\", lonesome and \"in a bad mood\".  Has thoughts of suicide but no plan or intent.  Feels she is safe    Arthralgia related to hip pains.      Ankles have been swelling for a couple of months.  Feels she is sitting a lot due to her hip pain.  Seems to be improved in the morning.  No orthopnea or PND    Has some urinary incontinence related to urge, not new.  Has been ongoing for some time.      Long standing constipation.  Unchanged.  Uses dulcolax chews most recently.      Notes she has not been feeling weak, marked on accident.      OBJECTIVE:   BP (!) 184/100   Pulse 79   Temp 97.1  F (36.2  C) (Tympanic)   Wt 87.4 kg (192 lb 9.6 oz)   SpO2 99%   Breastfeeding No   BMI 35.51 kg/m   Estimated body mass index is 35.51 kg/m  as calculated from the following:    Height as of 11/27/21: 1.568 m (5' 1.75\").    Weight as of this encounter: 87.4 kg (192 lb 9.6 oz).  Physical Exam  GENERAL APPEARANCE: healthy, alert and no distress  EYES: Eyes grossly normal to inspection, PERRL and conjunctivae and sclerae normal  NECK: no adenopathy, no asymmetry, masses, or scars and thyroid normal to palpation  RESP: lungs clear to auscultation - no rales, rhonchi or wheezes  BREAST: declined " by pt  CV: regular rate and rhythm, normal S1 S2, no S3 or S4, no murmur, click or rub, no peripheral edema and peripheral pulses strong  ABDOMEN: soft, nontender, no hepatosplenomegaly, no masses and bowel sounds normal  MS: no musculoskeletal defects are noted and gait is age appropriate without ataxia  NEURO: Normal strength and tone, sensory exam grossly normal, mentation intact and speech normal  PSYCH: mentation appears normal and affect normal/bright    Diagnostic Test Results:  Labs reviewed in Epic    ASSESSMENT / PLAN:       ICD-10-CM    1. Preop general physical exam  Z01.818 Basic metabolic panel  (Ca, Cl, CO2, Creat, Gluc, K, Na, BUN)     CBC with platelets     EKG 12-lead complete w/read - Clinics     Basic metabolic panel  (Ca, Cl, CO2, Creat, Gluc, K, Na, BUN)     CBC with platelets   2. Primary osteoarthritis of right hip  M16.11 Basic metabolic panel  (Ca, Cl, CO2, Creat, Gluc, K, Na, BUN)     CBC with platelets     EKG 12-lead complete w/read - Clinics     Basic metabolic panel  (Ca, Cl, CO2, Creat, Gluc, K, Na, BUN)     CBC with platelets   3. Essential hypertension with goal blood pressure less than 140/90  I10 Basic metabolic panel  (Ca, Cl, CO2, Creat, Gluc, K, Na, BUN)     EKG 12-lead complete w/read - Clinics     Basic metabolic panel  (Ca, Cl, CO2, Creat, Gluc, K, Na, BUN)     DISCONTINUED: lisinopril (ZESTRIL) 40 MG tablet   4. Encounter for Medicare annual wellness exam  Z00.00    5. Severe episode of recurrent major depressive disorder, without psychotic features (H)  F33.2 Adult Mental Health  Referral   6. Morbid obesity (H)  E66.01    7. Encounter for screening mammogram for breast cancer  Z12.31 *MA Screening Digital Bilateral   8. High priority for 2019-nCoV vaccine  Z23 COVID-19,PF,PFIZER (12+ Yrs GRAY LABEL)     HTN: uncontrolled and planning surgery. Plan dose increase lisinopril to 40mg and f/u next week for final surgical clearance    Depression:  Significantly  "worsened again, reluctant to consider change in sertraline and improvement from Abilify not sustained.  Willing to consider psychiatric eval at this time.  Referral placed.      Patient has been advised of split billing requirements and indicates understanding: Yes    COUNSELING:  Reviewed preventive health counseling, as reflected in patient instructions    Estimated body mass index is 35.51 kg/m  as calculated from the following:    Height as of 11/27/21: 1.568 m (5' 1.75\").    Weight as of this encounter: 87.4 kg (192 lb 9.6 oz).    Weight management plan: Discussed healthy diet and exercise guidelines    She reports that she has been smoking cigarettes. She has a 17.50 pack-year smoking history. She has never used smokeless tobacco.  Tobacco Cessation Action Plan:   Information offered: Patient not interested at this time      Appropriate preventive services were discussed with this patient, including applicable screening as appropriate for cardiovascular disease, diabetes, osteopenia/osteoporosis, and glaucoma.  As appropriate for age/gender, discussed screening for colorectal cancer, prostate cancer, breast cancer, and cervical cancer. Checklist reviewing preventive services available has been given to the patient.    Reviewed patients plan of care and provided an AVS. The Intermediate Care Plan ( asthma action plan, low back pain action plan, and migraine action plan) for Dee Dee meets the Care Plan requirement. This Care Plan has been established and reviewed with the Patient.    Counseling Resources:  ATP IV Guidelines  Pooled Cohorts Equation Calculator  Breast Cancer Risk Calculator  Breast Cancer: Medication to Reduce Risk  FRAX Risk Assessment  ICSI Preventive Guidelines  Dietary Guidelines for Americans, 2010  [employer]'s MyPlate  ASA Prophylaxis  Lung CA Screening    Dalia Wu DO  [facility]    Identified Health Risks:  Answers for HPI/ROS submitted by the patient on 5/27/2022  If " you checked off any problems, how difficult have these problems made it for you to do your work, take care of things at home, or get along with other people?: Very difficult  PHQ9 TOTAL SCORE: 21

## 2022-05-31 NOTE — PROGRESS NOTES
[facility]  [address] [facility] [address]  Phone: [phone]  Primary Provider: Dalia Wu  Pre-op Performing Provider: DALIA WU      PREOPERATIVE EVALUATION:  Today's date: 5/31/2022    Dee Dee Shepherd is a 66 year old female who presents for a preoperative evaluation.    Surgical Information:  Surgery/Procedure: right total hip arthroplasty  Surgery Location: [facility]  Surgeon: Dr Hernandez   Surgery Date: 06/16/22  Time of Surgery: tbd  Where patient plans to recover: At home with family  Fax number for surgical facility: Note does not need to be faxed, will be available electronically in Epic.    Type of Anesthesia Anticipated: Spinal    Assessment & Plan     The proposed surgical procedure is considered INTERMEDIATE risk.      ICD-10-CM    1. Preop general physical exam  Z01.818 Basic metabolic panel  (Ca, Cl, CO2, Creat, Gluc, K, Na, BUN)     CBC with platelets     EKG 12-lead complete w/read - Clinics     Basic metabolic panel  (Ca, Cl, CO2, Creat, Gluc, K, Na, BUN)     CBC with platelets   2. Primary osteoarthritis of right hip  M16.11 Basic metabolic panel  (Ca, Cl, CO2, Creat, Gluc, K, Na, BUN)     CBC with platelets     EKG 12-lead complete w/read - Clinics     Basic metabolic panel  (Ca, Cl, CO2, Creat, Gluc, K, Na, BUN)     CBC with platelets   3. Essential hypertension with goal blood pressure less than 140/90  I10 Basic metabolic panel  (Ca, Cl, CO2, Creat, Gluc, K, Na, BUN)     EKG 12-lead complete w/read - Clinics     Basic metabolic panel  (Ca, Cl, CO2, Creat, Gluc, K, Na, BUN)     DISCONTINUED: lisinopril (ZESTRIL) 40 MG tablet   4. Encounter for Medicare annual wellness exam  Z00.00    5. Severe episode of recurrent major depressive disorder, without psychotic features (H)  F33.2 Adult Mental Health  Referral   6. Morbid obesity (H)  E66.01    7. Encounter for screening mammogram for breast cancer  Z12.31 *MA Screening Digital Bilateral    8. High priority for 2019-nCoV vaccine  Z23 COVID-19,PF,PFIZER (12+ Yrs GRAY LABEL)         Risks and Recommendations:  The patient has the following additional risks and recommendations for perioperative complications:   - No identified additional risk factors other than previously addressed    Medication Instructions:  Patient is to take all scheduled medications on the day of surgery    RECOMMENDATION:  APPROVAL GIVEN to proceed with proposed procedure, without further diagnostic evaluation.    Significantly elevated blood pressure at time of initial evaluation.  Increased dose of lisinopril and follow up evaluation revealed improved BP.  BMP stable on increased dose.  Mild hypertension persists on follow up but given timing of procedure and risk of hypotension with anesthesia/blood loss will hold on additional adjustments.  Pt cleared to proceed with surgery    Subjective     HPI related to upcoming procedure: Symptomatic OA hip, failed conservative management, planned SREE      1. No - Have you ever had a heart attack or stroke?  2. No - Have you ever had surgery on your heart or blood vessels, such as a stent, coronary (heart) bypass, or surgery on an artery in the head, neck, heart, or legs?  3. No - Do you have chest pain when you are physically active?  4. No - Do you have a history of heart failure?  5. No - Do you currently have a cold, bronchitis, or symptoms of other respiratory (head and chest) infections?  6. No - Do you have a cough, shortness of breath, or wheezing?  7. No - Do you or anyone in your family have a history of blood clots?  8. No - Do you or anyone in your family have a serious bleeding problem, such as long-lasting bleeding after surgeries or cuts?  9. No - Have you ever had anemia or been told to take iron pills?  10. No - Have you had any abnormal blood loss such as black, tarry or bloody stools, or abnormal vaginal bleeding?  11. No - Have you ever had a blood transfusion?  12.  Yes - Are you willing to have a blood transfusion if it is medically needed before, during, or after your surgery?  13. No - Have you or anyone in your family ever had problems with anesthesia (sedation for surgery)?  14. No - Do you have sleep apnea, excessive snoring, or daytime drowsiness?   15. No - Do you have any artifical heart valves or other implanted medical devices, such as a pacemaker, defibrillator, or continuous glucose monitor?  16. No - Do you have any artifical joints?  17. No - Are you allergic to latex?  18. No - Is there any chance that you may be pregnant?      Health Care Directive:  Patient does not have a Health Care Directive or Living Will: Discussed advance care planning with patient; information given to patient to review.    Preoperative Review of :   reviewed - controlled substances reflected in medication list.      Status of Chronic Conditions:  See problem list for active medical problems.  Problems all longstanding and stable, except as noted/documented.  See ROS for pertinent symptoms related to these conditions.      Review of Systems   Constitutional: Negative for chills and fever.   HENT: Negative for congestion, ear pain, hearing loss and sore throat.    Eyes: Negative for pain and visual disturbance.   Respiratory: Negative for cough and shortness of breath.    Cardiovascular: Negative for chest pain and palpitations.   Gastrointestinal: Positive for constipation. Negative for abdominal pain, diarrhea and nausea.   Genitourinary: Positive for frequency and urgency. Negative for dysuria, genital sores, hematuria, pelvic pain, vaginal bleeding and vaginal discharge.   Musculoskeletal: Positive for arthralgias and joint swelling. Negative for myalgias.   Skin: Negative for rash.   Neurological: Positive for weakness. Negative for dizziness and headaches.   Psychiatric/Behavioral: The patient is nervous/anxious.          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 with   2/14 nodes positive, ER/AZ negative, HER2 negative    1/10 completed 4 cycles of dose dense AC followed by 4 cycles of dose   dense Taxol    3/10 completed radiation       Severe episode of recurrent major depressive disorder, without psychotic features (H) 05/17/2018     Priority: Medium     Primary osteoarthritis of right hip 05/17/2018     Priority: Medium     Essential hypertension with goal blood pressure less than 140/90 05/14/2018     Priority: Medium     Hyperlipidemia LDL goal <130 05/14/2018     Priority: Medium     Abnormal glucose 05/14/2018     Priority: Medium      Past Medical History:   Diagnosis Date     Anxiety      Chronic low back pain      Depressive disorder      Hyperlipidemia      Hypertension      Malignant neoplasm of breast (female) (H)     right     Pap smear abnormality of cervix with cytologic evidence of malignancy      Tobacco abuse      Past Surgical History:   Procedure Laterality Date     COLONOSCOPY N/A 5/3/2021    Procedure: Colonoscopy, With Polypectomy And Biopsy;  Surgeon: Ney Torrez MD;  Location: [facility]     MASTECTOMY       ZC LIGATE FALLOPIAN TUBE       Current Outpatient Medications   Medication Sig Dispense Refill     acetaminophen (TYLENOL) 650 MG CR tablet Take 650 mg by mouth every 8 hours as needed for mild pain or fever       ARIPiprazole (ABILIFY) 5 MG tablet Take 1 tablet (5 mg) by mouth daily 90 tablet 0     Diphenhydramine-APAP, sleep,  MG TABS Take by mouth At Bedtime        simvastatin (ZOCOR) 40 MG tablet TAKE 1 TABLET BY MOUTH EVERY DAY IN THE EVENING 90 tablet 1     traMADol (ULTRAM) 50 MG tablet Take 50 mg by mouth every 6 hours as needed for severe pain       hydrochlorothiazide (HYDRODIURIL) 25 MG tablet TAKE 1 TABLET DAILY (EVERY  24 HOURS) 90 tablet 1     lisinopril (ZESTRIL) 40 MG tablet Take 1 tablet (40 mg) by mouth daily 30 tablet 0     potassium chloride ER (KLOR-CON) 20 MEQ CR tablet Take 1 tablet (20 mEq) by mouth daily 90 tablet 0     sertraline (ZOLOFT) 100 MG tablet TAKE 3 TABLETS BY MOUTH EVERY  tablet 0       No Known Allergies     Social History     Tobacco Use     Smoking status: Current Every Day Smoker     Packs/day: 0.50     Years: 35.00     Pack years: 17.50     Types: Cigarettes     Smokeless tobacco: Never Used   Substance Use Topics     Alcohol use: Yes     Comment: weekends drinking more 3-5 drinks     Family History   Problem Relation Age of Onset     Hypertension Father      Diabetes Brother      History   Drug Use No         Objective     BP (!) 184/100   Pulse 79   Temp 97.1  F (36.2  C) (Tympanic)   Wt 87.4 kg (192 lb 9.6 oz)   SpO2 99%   Breastfeeding No   BMI 35.51 kg/m      Physical Exam    GENERAL APPEARANCE: healthy, alert and no distress     EYES: EOMI, PERRL     NECK: no adenopathy, no asymmetry, masses, or scars and thyroid normal to palpation     RESP: lungs clear to auscultation - no rales, rhonchi or wheezes     CV: regular rates and rhythm, normal S1 S2, no S3 or S4 and no murmur, click or rub     ABDOMEN:  soft, nontender, no HSM or masses and bowel sounds normal     MS: extremities normal- no gross deformities noted, no evidence of inflammation in joints, FROM in all extremities.     NEURO: Normal strength and tone, sensory exam grossly normal, mentation intact and speech normal     PSYCH: mentation appears normal. and affect normal/bright     LYMPHATICS: No cervical adenopathy    Recent Labs   Lab Test 11/16/21  1559 09/17/20  0759    140   POTASSIUM 4.0 4.3   CR 0.74 0.86   A1C 5.7* 5.3        Diagnostics:  Recent Results (from the past 168 hour(s))   Basic metabolic panel  (Ca, Cl, CO2, Creat, Gluc, K, Na, BUN)    Collection Time: 05/31/22  2:33 PM   Result Value Ref Range     Sodium 139 133 - 144 mmol/L    Potassium 3.7 3.4 - 5.3 mmol/L    Chloride 108 94 - 109 mmol/L    Carbon Dioxide (CO2) 24 20 - 32 mmol/L    Anion Gap 7 3 - 14 mmol/L    Urea Nitrogen 13 7 - 30 mg/dL    Creatinine 0.71 0.52 - 1.04 mg/dL    Calcium 9.1 8.5 - 10.1 mg/dL    Glucose 96 70 - 99 mg/dL    GFR Estimate >90 >60 mL/min/1.73m2   CBC with platelets    Collection Time: 05/31/22  2:33 PM   Result Value Ref Range    WBC Count 6.8 4.0 - 11.0 10e3/uL    RBC Count 4.19 3.80 - 5.20 10e6/uL    Hemoglobin 14.0 11.7 - 15.7 g/dL    Hematocrit 41.1 35.0 - 47.0 %    MCV 98 78 - 100 fL    MCH 33.4 (H) 26.5 - 33.0 pg    MCHC 34.1 31.5 - 36.5 g/dL    RDW 13.1 10.0 - 15.0 %    Platelet Count 226 150 - 450 10e3/uL   Basic metabolic panel  (Ca, Cl, CO2, Creat, Gluc, K, Na, BUN)    Collection Time: 06/06/22  2:22 PM   Result Value Ref Range    Sodium 138 133 - 144 mmol/L    Potassium 4.5 3.4 - 5.3 mmol/L    Chloride 109 94 - 109 mmol/L    Carbon Dioxide (CO2) 26 20 - 32 mmol/L    Anion Gap 3 3 - 14 mmol/L    Urea Nitrogen 13 7 - 30 mg/dL    Creatinine 0.86 0.52 - 1.04 mg/dL    Calcium 9.3 8.5 - 10.1 mg/dL    Glucose 82 70 - 99 mg/dL    GFR Estimate 73 >60 mL/min/1.73m2      EKG: appears normal, NSR, normal axis, normal intervals, no acute ST/T changes c/w ischemia, no LVH by voltage criteria, unchanged from previous tracings    Revised Cardiac Risk Index (RCRI):  The patient has the following serious cardiovascular risks for perioperative complications:   - No serious cardiac risks = 0 points     RCRI Interpretation: 0 points: Class I (very low risk - 0.4% complication rate)           Signed Electronically by: Dalia Wu DO  Copy of this evaluation report is provided to requesting physician.

## 2022-05-31 NOTE — H&P (VIEW-ONLY)
[facility]  [address] [address] [facility] [address]  Phone: [phone]  Primary Provider: Dalia Wu  Pre-op Performing Provider: DALIA WU      PREOPERATIVE EVALUATION:  Today's date: 5/31/2022    Dee Dee Shepherd is a 66 year old female who presents for a preoperative evaluation.    Surgical Information:  Surgery/Procedure: right total hip arthroplasty  Surgery Location: [facility]  Surgeon: Dr Hernandez   Surgery Date: 06/16/22  Time of Surgery: tbd  Where patient plans to recover: At home with family  Fax number for surgical facility: Note does not need to be faxed, will be available electronically in Epic.    Type of Anesthesia Anticipated: Spinal    Assessment & Plan     The proposed surgical procedure is considered INTERMEDIATE risk.      ICD-10-CM    1. Preop general physical exam  Z01.818 Basic metabolic panel  (Ca, Cl, CO2, Creat, Gluc, K, Na, BUN)     CBC with platelets     EKG 12-lead complete w/read - Clinics     Basic metabolic panel  (Ca, Cl, CO2, Creat, Gluc, K, Na, BUN)     CBC with platelets   2. Primary osteoarthritis of right hip  M16.11 Basic metabolic panel  (Ca, Cl, CO2, Creat, Gluc, K, Na, BUN)     CBC with platelets     EKG 12-lead complete w/read - Clinics     Basic metabolic panel  (Ca, Cl, CO2, Creat, Gluc, K, Na, BUN)     CBC with platelets   3. Essential hypertension with goal blood pressure less than 140/90  I10 Basic metabolic panel  (Ca, Cl, CO2, Creat, Gluc, K, Na, BUN)     EKG 12-lead complete w/read - Clinics     Basic metabolic panel  (Ca, Cl, CO2, Creat, Gluc, K, Na, BUN)     DISCONTINUED: lisinopril (ZESTRIL) 40 MG tablet   4. Encounter for Medicare annual wellness exam  Z00.00    5. Severe episode of recurrent major depressive disorder, without psychotic features (H)  F33.2 Adult Mental Health  Referral   6. Morbid obesity (H)  E66.01    7. Encounter for screening mammogram for breast cancer  Z12.31 *MA Screening Digital Bilateral    8. High priority for 2019-nCoV vaccine  Z23 COVID-19,PF,PFIZER (12+ Yrs GRAY LABEL)         Risks and Recommendations:  The patient has the following additional risks and recommendations for perioperative complications:   - No identified additional risk factors other than previously addressed    Medication Instructions:  Patient is to take all scheduled medications on the day of surgery    RECOMMENDATION:  APPROVAL GIVEN to proceed with proposed procedure, without further diagnostic evaluation.    Significantly elevated blood pressure at time of initial evaluation.  Increased dose of lisinopril and follow up evaluation revealed improved BP.  BMP stable on increased dose.  Mild hypertension persists on follow up but given timing of procedure and risk of hypotension with anesthesia/blood loss will hold on additional adjustments.  Pt cleared to proceed with surgery    Subjective     HPI related to upcoming procedure: Symptomatic OA hip, failed conservative management, planned SREE      1. No - Have you ever had a heart attack or stroke?  2. No - Have you ever had surgery on your heart or blood vessels, such as a stent, coronary (heart) bypass, or surgery on an artery in the head, neck, heart, or legs?  3. No - Do you have chest pain when you are physically active?  4. No - Do you have a history of heart failure?  5. No - Do you currently have a cold, bronchitis, or symptoms of other respiratory (head and chest) infections?  6. No - Do you have a cough, shortness of breath, or wheezing?  7. No - Do you or anyone in your family have a history of blood clots?  8. No - Do you or anyone in your family have a serious bleeding problem, such as long-lasting bleeding after surgeries or cuts?  9. No - Have you ever had anemia or been told to take iron pills?  10. No - Have you had any abnormal blood loss such as black, tarry or bloody stools, or abnormal vaginal bleeding?  11. No - Have you ever had a blood transfusion?  12.  Yes - Are you willing to have a blood transfusion if it is medically needed before, during, or after your surgery?  13. No - Have you or anyone in your family ever had problems with anesthesia (sedation for surgery)?  14. No - Do you have sleep apnea, excessive snoring, or daytime drowsiness?   15. No - Do you have any artifical heart valves or other implanted medical devices, such as a pacemaker, defibrillator, or continuous glucose monitor?  16. No - Do you have any artifical joints?  17. No - Are you allergic to latex?  18. No - Is there any chance that you may be pregnant?      Health Care Directive:  Patient does not have a Health Care Directive or Living Will: Discussed advance care planning with patient; information given to patient to review.    Preoperative Review of :   reviewed - controlled substances reflected in medication list.      Status of Chronic Conditions:  See problem list for active medical problems.  Problems all longstanding and stable, except as noted/documented.  See ROS for pertinent symptoms related to these conditions.      Review of Systems   Constitutional: Negative for chills and fever.   HENT: Negative for congestion, ear pain, hearing loss and sore throat.    Eyes: Negative for pain and visual disturbance.   Respiratory: Negative for cough and shortness of breath.    Cardiovascular: Negative for chest pain and palpitations.   Gastrointestinal: Positive for constipation. Negative for abdominal pain, diarrhea and nausea.   Genitourinary: Positive for frequency and urgency. Negative for dysuria, genital sores, hematuria, pelvic pain, vaginal bleeding and vaginal discharge.   Musculoskeletal: Positive for arthralgias and joint swelling. Negative for myalgias.   Skin: Negative for rash.   Neurological: Positive for weakness. Negative for dizziness and headaches.   Psychiatric/Behavioral: The patient is nervous/anxious.          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 with   2/14 nodes positive, ER/TN negative, HER2 negative    1/10 completed 4 cycles of dose dense AC followed by 4 cycles of dose   dense Taxol    3/10 completed radiation       Severe episode of recurrent major depressive disorder, without psychotic features (H) 05/17/2018     Priority: Medium     Primary osteoarthritis of right hip 05/17/2018     Priority: Medium     Essential hypertension with goal blood pressure less than 140/90 05/14/2018     Priority: Medium     Hyperlipidemia LDL goal <130 05/14/2018     Priority: Medium     Abnormal glucose 05/14/2018     Priority: Medium      Past Medical History:   Diagnosis Date     Anxiety      Chronic low back pain      Depressive disorder      Hyperlipidemia      Hypertension      Malignant neoplasm of breast (female) (H)     right     Pap smear abnormality of cervix with cytologic evidence of malignancy      Tobacco abuse      Past Surgical History:   Procedure Laterality Date     COLONOSCOPY N/A 5/3/2021    Procedure: Colonoscopy, With Polypectomy And Biopsy;  Surgeon: Ney Torrez MD;  Location: [facility]     MASTECTOMY       ZC LIGATE FALLOPIAN TUBE       Current Outpatient Medications   Medication Sig Dispense Refill     acetaminophen (TYLENOL) 650 MG CR tablet Take 650 mg by mouth every 8 hours as needed for mild pain or fever       ARIPiprazole (ABILIFY) 5 MG tablet Take 1 tablet (5 mg) by mouth daily 90 tablet 0     Diphenhydramine-APAP, sleep,  MG TABS Take by mouth At Bedtime        simvastatin (ZOCOR) 40 MG tablet TAKE 1 TABLET BY MOUTH EVERY DAY IN THE EVENING 90 tablet 1     traMADol (ULTRAM) 50 MG tablet Take 50 mg by mouth every 6 hours as needed for severe pain       hydrochlorothiazide (HYDRODIURIL) 25 MG tablet TAKE 1 TABLET DAILY (EVERY  24 HOURS) 90 tablet 1     lisinopril (ZESTRIL) 40 MG tablet Take 1 tablet (40 mg) by mouth daily 30 tablet 0     potassium chloride ER (KLOR-CON) 20 MEQ CR tablet Take 1 tablet (20 mEq) by mouth daily 90 tablet 0     sertraline (ZOLOFT) 100 MG tablet TAKE 3 TABLETS BY MOUTH EVERY  tablet 0       No Known Allergies     Social History     Tobacco Use     Smoking status: Current Every Day Smoker     Packs/day: 0.50     Years: 35.00     Pack years: 17.50     Types: Cigarettes     Smokeless tobacco: Never Used   Substance Use Topics     Alcohol use: Yes     Comment: weekends drinking more 3-5 drinks     Family History   Problem Relation Age of Onset     Hypertension Father      Diabetes Brother      History   Drug Use No         Objective     BP (!) 184/100   Pulse 79   Temp 97.1  F (36.2  C) (Tympanic)   Wt 87.4 kg (192 lb 9.6 oz)   SpO2 99%   Breastfeeding No   BMI 35.51 kg/m      Physical Exam    GENERAL APPEARANCE: healthy, alert and no distress     EYES: EOMI, PERRL     NECK: no adenopathy, no asymmetry, masses, or scars and thyroid normal to palpation     RESP: lungs clear to auscultation - no rales, rhonchi or wheezes     CV: regular rates and rhythm, normal S1 S2, no S3 or S4 and no murmur, click or rub     ABDOMEN:  soft, nontender, no HSM or masses and bowel sounds normal     MS: extremities normal- no gross deformities noted, no evidence of inflammation in joints, FROM in all extremities.     NEURO: Normal strength and tone, sensory exam grossly normal, mentation intact and speech normal     PSYCH: mentation appears normal. and affect normal/bright     LYMPHATICS: No cervical adenopathy    Recent Labs   Lab Test 11/16/21  1559 09/17/20  0759    140   POTASSIUM 4.0 4.3   CR 0.74 0.86   A1C 5.7* 5.3        Diagnostics:  Recent Results (from the past 168 hour(s))   Basic metabolic panel  (Ca, Cl, CO2, Creat, Gluc, K, Na, BUN)    Collection Time: 05/31/22  2:33 PM   Result Value Ref Range     Sodium 139 133 - 144 mmol/L    Potassium 3.7 3.4 - 5.3 mmol/L    Chloride 108 94 - 109 mmol/L    Carbon Dioxide (CO2) 24 20 - 32 mmol/L    Anion Gap 7 3 - 14 mmol/L    Urea Nitrogen 13 7 - 30 mg/dL    Creatinine 0.71 0.52 - 1.04 mg/dL    Calcium 9.1 8.5 - 10.1 mg/dL    Glucose 96 70 - 99 mg/dL    GFR Estimate >90 >60 mL/min/1.73m2   CBC with platelets    Collection Time: 05/31/22  2:33 PM   Result Value Ref Range    WBC Count 6.8 4.0 - 11.0 10e3/uL    RBC Count 4.19 3.80 - 5.20 10e6/uL    Hemoglobin 14.0 11.7 - 15.7 g/dL    Hematocrit 41.1 35.0 - 47.0 %    MCV 98 78 - 100 fL    MCH 33.4 (H) 26.5 - 33.0 pg    MCHC 34.1 31.5 - 36.5 g/dL    RDW 13.1 10.0 - 15.0 %    Platelet Count 226 150 - 450 10e3/uL   Basic metabolic panel  (Ca, Cl, CO2, Creat, Gluc, K, Na, BUN)    Collection Time: 06/06/22  2:22 PM   Result Value Ref Range    Sodium 138 133 - 144 mmol/L    Potassium 4.5 3.4 - 5.3 mmol/L    Chloride 109 94 - 109 mmol/L    Carbon Dioxide (CO2) 26 20 - 32 mmol/L    Anion Gap 3 3 - 14 mmol/L    Urea Nitrogen 13 7 - 30 mg/dL    Creatinine 0.86 0.52 - 1.04 mg/dL    Calcium 9.3 8.5 - 10.1 mg/dL    Glucose 82 70 - 99 mg/dL    GFR Estimate 73 >60 mL/min/1.73m2      EKG: appears normal, NSR, normal axis, normal intervals, no acute ST/T changes c/w ischemia, no LVH by voltage criteria, unchanged from previous tracings    Revised Cardiac Risk Index (RCRI):  The patient has the following serious cardiovascular risks for perioperative complications:   - No serious cardiac risks = 0 points     RCRI Interpretation: 0 points: Class I (very low risk - 0.4% complication rate)           Signed Electronically by: Dalia Wu DO  Copy of this evaluation report is provided to requesting physician.

## 2022-05-31 NOTE — PATIENT INSTRUCTIONS
For your blood pressure:  it is very important we get this under control before your surgery.  We'll start by increasing your lisinopril to 40mg daily.  I sent a new prescription to your pharmacy for the higher dose.  Please start this right away.  We'll need to see you back in clinic next week to be sure things are improving and recheck your blood work.    For your mood:  I placed a referral to visit with one of the psychiatric providers.  They will call you to schedule.    For your surgery:  You can continue the acetaminophen now more then 3000mg in 24 hours.  Avoid aspirin, ibuprofen, aleve and herbal supplements in the week before your surgery,          Preventive Health Recommendations    See your health care provider every year to  Review health changes.   Discuss preventive care.    Review your medicines if your doctor has prescribed any.  You no longer need a yearly Pap test unless you've had an abnormal Pap test in the past 10 years. If you have vaginal symptoms, such as bleeding or discharge, be sure to talk with your provider about a Pap test.  Every 1 to 2 years, have a mammogram.  If you are over 69, talk with your health care provider about whether or not you want to continue having screening mammograms.  Every 10 years, have a colonoscopy. Or, have a yearly FIT test (stool test). These exams will check for colon cancer.   Have a cholesterol test every 5 years, or more often if your doctor advises it.   Have a diabetes test (fasting glucose) every three years. If you are at risk for diabetes, you should have this test more often.   At age 65, have a bone density scan (DEXA) to check for osteoporosis (brittle bone disease).    Shots:  Get a flu shot each year.  Get a tetanus shot every 10 years.  Talk to your doctor about your pneumonia vaccines. There are now two you should receive - Pneumovax (PPSV 23) and Prevnar (PCV 13).  Talk to your pharmacist about the shingles vaccine.  Talk to your doctor  "about the hepatitis B vaccine.    Nutrition:   Eat at least 5 servings of fruits and vegetables each day.  Eat whole-grain bread, whole-wheat pasta and brown rice instead of white grains and rice.  Get adequate Calcium and Vitamin D.     Lifestyle  Exercise at least 150 minutes a week (30 minutes a day, 5 days a week). This will help you control your weight and prevent disease.  Limit alcohol to one drink per day.  No smoking.   Wear sunscreen to prevent skin cancer.   See your dentist twice a year for an exam and cleaning.  See your eye doctor every 1 to 2 years to screen for conditions such as glaucoma, macular degeneration and cataracts.          Depression and Suicide in Older Adults    Nearly 2 million older Americans have some type of depression. Some of them even take their own lives. Yet depression among older adults is often ignored. Learn the warning signs. You may help spare a loved one needless pain. You may also save a life.   What is depression?  Depression is a common and serious illness that affects the way you think and feel. It is not a normal part of aging, nor is it a sign of weakness, a character flaw, or something you can snap out of. Most people with depression need treatment to get better. The most common symptom is a feeling of deep sadness. People who are depressed also may seem tired and listless. And nothing seems to give them pleasure. It s normal to grieve or be sad sometimes. But sadness lessens or passes with time. Depression rarely goes away or improves on its own. A person with clinical depression can't \"snap out of it.\" Other symptoms of depression are:   Sleeping more or less than normal  Eating more or less than normal  Having headaches, stomachaches, or other pains that don t go away  Feeling nervous,  empty,  or worthless  Crying a great deal  Thinking or talking about suicide or death  Loss of interest in activities previously enjoyed  Social isolation  Feeling confused or " forgetful  What causes it?  The causes of depression aren t fully known. But it is thought to result from a complex blend of these factors:   Biochemistry. Certain chemicals in the brain play a role.  Genes. Depression does run in families.  Life stress. Life stresses can also trigger depression in some people. Older adults often face many stressors, such as death of friends or a spouse, health problems, and financial concerns.  Chronic conditions. This includes conditions such as diabetes, heart disease, or cancer. These can cause symptoms of depression. Medicine side effects can cause changes in thoughts and behaviors.  How you can help  Often, depressed people may not want to ask for help. When they do, they may be ignored. Or, they may receive the wrong treatment. You can help by showing parents and older friends love and support. If they seem depressed, don t lecture the person, ignore the symptoms, or discount the symptoms as a  normal  part of aging -which they are not. Get involved, listen, and show interest and support.   Help them understand that depression is a treatable illness. Tell them you can help them find the right treatment. Offer to go to their healthcare provider's appointment with them for support when the symptoms are discussed. With their approval, contact a local mental health center, social service agency, or hospital about services.   You can be an advocate for him or her at healthcare appointments. Many older adults have chronic illnesses that can cause symptoms of depression. Medicine side effects can change thoughts and behaviors. You can help make sure that the healthcare provider looks at all of these factors. He or she should refer your family member or friend to a mental healthcare provider when needed. in some cases, untreated depression can lead to a misdiagnosis. A person may be diagnosed with a brain disorder such as dementia. If the healthcare provider does not take the issue  of depression seriously, help your family member or friend to find another provider.   Don't be afraid to ask  If you think an older person you care about could be suicidal, ask,  Have you thought about suicide?  Most people will tell you the truth. If they say  yes,  they may already have a plan for how and when they will attempt it. Find out as much as you can. The more detailed the plan, and the easier it is to carry out, the more danger the person is in right now. Tell the person you are there for them and do not want them to harm him or herself. Don't wait to get help for the person. Call the person's healthcare provider, local hospital, or emergency services.   To learn more  National Suicide Prevention Lifeline (crisis hotline) 183-207-PTEI (509-946-2480)  National [place] of Mental Mldsuj521-745-0387tsy.[facility].nih.gov  National [place] on Mental Usslsrp268-257-9172xgo.rosi.org  Mental Health Tdiehsi277-326-9235bzm.[facility].org  National Suicide Geonzis217-HIQXGWL (137-979-8338)    Call 911  Never leave the person alone. A person who is actively suicidal needs psychiatric care right away. They will need constant supervision. Never leave the person out of sight. Call 911 or the national 24-hour suicide crisis hotline at 654-500-ECXG (433-551-8982). You can also take the person to the closest emergency room.   [employer] last reviewed this educational content on 5/1/2020 2000-2021 [employer]. All rights reserved. This information is not intended as a substitute for professional medical care. Always follow your healthcare professional's instructions.           Preparing for Your Surgery  Getting started  A nurse will call you to review your health history and instructions. They will give you an arrival time based on your scheduled surgery time. Please be ready to share:  Your doctor's clinic name and phone number  Your medical, surgical and anesthesia history  A list of allergies and  sensitivities  A list of medicines, including herbal treatments and over-the-counter drugs  Whether the patient has a legal guardian (ask how to send us the papers in advance)  Please tell us if you're pregnant--or if there's any chance you might be pregnant. Some surgeries may injure a fetus (unborn baby), so they require a pregnancy test. Surgeries that are safe for a fetus don't always need a test, and you can choose whether to have one.   If you have a child who's having surgery, please ask for a copy of Preparing for Your Child's Surgery.    Preparing for surgery  Within 30 days of surgery: Have a pre-op exam (sometimes called an H&P, or History and Physical). This can be done at a clinic or pre-operative center.  If you're having a , you may not need this exam. Talk to your care team.  At your pre-op exam, talk to your care team about all medicines you take. If you need to stop any medicines before surgery, ask when to start taking them again.  We do this for your safety. Many medicines can make you bleed too much during surgery. Some change how well surgery (anesthesia) drugs work.  Call your insurance company to let them know you're having surgery. (If you don't have insurance, call [phone].)  Call your clinic if there's any change in your health. This includes signs of a cold or flu (sore throat, runny nose, cough, rash, fever). It also includes a scrape or scratch near the surgery site.  If you have questions on the day of surgery, call your hospital or surgery center.  COVID testing  You may need to be tested for COVID-19 before having surgery. If so, we will give you instructions.  Eating and drinking guidelines  For your safety: Unless your surgeon tells you otherwise, follow the guidelines below.  Eat and drink as usual until 8 hours before surgery. After that, no food or milk.  Drink clear liquids until 2 hours before surgery. These are liquids you can see through, like water, Gatorade  and Propel Water. You may also have black coffee and tea (no cream or milk).  Nothing by mouth within 2 hours of surgery. This includes gum, candy and breath mints.  If you drink alcohol: Stop drinking it the night before surgery.  If your care team tells you to take medicine on the morning of surgery, it's okay to take it with a sip of water.  Preventing infection  Shower or bathe the night before and morning of your surgery. Follow the instructions your clinic gave you. (If no instructions, use regular soap.)  Don't shave or clip hair near your surgery site. We'll remove the hair if needed.  Don't smoke or vape the morning of surgery. You may chew nicotine gum up to 2 hours before surgery. A nicotine patch is okay.  Note: Some surgeries require you to completely quit smoking and nicotine. Check with your surgeon.  Your care team will make every effort to keep you safe from infection. We will:  Clean our hands often with soap and water (or an alcohol-based hand rub).  Clean the skin at your surgery site with a special soap that kills germs.  Give you a special gown to keep you warm. (Cold raises the risk of infection.)  Wear special hair covers, masks, gowns and gloves during surgery.  Give antibiotic medicine, if prescribed. Not all surgeries need antibiotics.  What to bring on the day of surgery  Photo ID and insurance card  Copy of your health care directive, if you have one  Glasses and hearing aides (bring cases)  You can't wear contacts during surgery  Inhaler and eye drops, if you use them (tell us about these when you arrive)  CPAP machine or breathing device, if you use them  A few personal items, if spending the night  If you have . . .  A pacemaker, ICD (cardiac defibrillator) or other implant: Bring the ID card.  An implanted stimulator: Bring the remote control.  A legal guardian: Bring a copy of the certified (court-stamped) guardianship papers.  Please remove any jewelry, including body piercings.  Leave jewelry and other valuables at home.  If you're going home the day of surgery  You must have a responsible adult drive you home. They should stay with you overnight as well.  If you don't have someone to stay with you, and you aren't safe to go home alone, we may keep you overnight. Insurance often won't pay for this.  After surgery  If it's hard to control your pain or you need more pain medicine, please call your surgeon's office.  Questions?   If you have any questions for your care team, list them here: _________________________________________________________________________________________________________________________________________________________________________ ____________________________________ ____________________________________ ____________________________________  For informational purposes only. Not to replace the advice of your health care provider. Copyright   2003, 2019 [employer]. All rights reserved. Clinically reviewed by Yeimi Oswald MD. [employer] 180916 - REV 07/21.

## 2022-06-06 ENCOUNTER — OFFICE VISIT (OUTPATIENT)
Dept: FAMILY MEDICINE | Facility: CLINIC | Age: 67
End: 2022-06-06
Payer: COMMERCIAL

## 2022-06-06 VITALS
BODY MASS INDEX: 35.11 KG/M2 | TEMPERATURE: 97.2 F | OXYGEN SATURATION: 98 % | SYSTOLIC BLOOD PRESSURE: 150 MMHG | DIASTOLIC BLOOD PRESSURE: 82 MMHG | HEART RATE: 74 BPM | WEIGHT: 190.4 LBS

## 2022-06-06 DIAGNOSIS — R11.0 NAUSEA: ICD-10-CM

## 2022-06-06 DIAGNOSIS — F33.2 SEVERE EPISODE OF RECURRENT MAJOR DEPRESSIVE DISORDER, WITHOUT PSYCHOTIC FEATURES (H): ICD-10-CM

## 2022-06-06 DIAGNOSIS — E87.6 HYPOKALEMIA: ICD-10-CM

## 2022-06-06 DIAGNOSIS — I10 ESSENTIAL HYPERTENSION WITH GOAL BLOOD PRESSURE LESS THAN 140/90: Primary | ICD-10-CM

## 2022-06-06 LAB
ANION GAP SERPL CALCULATED.3IONS-SCNC: 3 MMOL/L (ref 3–14)
BUN SERPL-MCNC: 13 MG/DL (ref 7–30)
CALCIUM SERPL-MCNC: 9.3 MG/DL (ref 8.5–10.1)
CHLORIDE BLD-SCNC: 109 MMOL/L (ref 94–109)
CO2 SERPL-SCNC: 26 MMOL/L (ref 20–32)
CREAT SERPL-MCNC: 0.86 MG/DL (ref 0.52–1.04)
GFR SERPL CREATININE-BSD FRML MDRD: 73 ML/MIN/1.73M2
GLUCOSE BLD-MCNC: 82 MG/DL (ref 70–99)
POTASSIUM BLD-SCNC: 4.5 MMOL/L (ref 3.4–5.3)
SODIUM SERPL-SCNC: 138 MMOL/L (ref 133–144)

## 2022-06-06 PROCEDURE — 36415 COLL VENOUS BLD VENIPUNCTURE: CPT | Performed by: FAMILY MEDICINE

## 2022-06-06 PROCEDURE — 80048 BASIC METABOLIC PNL TOTAL CA: CPT | Performed by: FAMILY MEDICINE

## 2022-06-06 PROCEDURE — 99213 OFFICE O/P EST LOW 20 MIN: CPT | Performed by: FAMILY MEDICINE

## 2022-06-06 RX ORDER — SERTRALINE HYDROCHLORIDE 100 MG/1
TABLET, FILM COATED ORAL
Qty: 270 TABLET | Refills: 0 | Status: SHIPPED | OUTPATIENT
Start: 2022-06-06 | End: 2022-11-18

## 2022-06-06 RX ORDER — HYDROCHLOROTHIAZIDE 25 MG/1
TABLET ORAL
Qty: 90 TABLET | Refills: 1 | Status: SHIPPED | OUTPATIENT
Start: 2022-06-06 | End: 2022-11-18

## 2022-06-06 RX ORDER — POTASSIUM CHLORIDE 1500 MG/1
20 TABLET, EXTENDED RELEASE ORAL DAILY
Qty: 90 TABLET | Refills: 0 | Status: SHIPPED | OUTPATIENT
Start: 2022-06-06 | End: 2022-08-01

## 2022-06-06 RX ORDER — LISINOPRIL 40 MG/1
40 TABLET ORAL DAILY
Qty: 30 TABLET | Refills: 0 | Status: SHIPPED | OUTPATIENT
Start: 2022-06-06 | End: 2022-06-24

## 2022-06-06 RX ORDER — ARIPIPRAZOLE 5 MG/1
5 TABLET ORAL DAILY
Qty: 90 TABLET | Refills: 0 | Status: CANCELLED | OUTPATIENT
Start: 2022-06-06

## 2022-06-06 NOTE — NURSING NOTE
"Initial BP (!) 158/88   Pulse 74   Temp 97.2  F (36.2  C) (Tympanic)   Wt 86.4 kg (190 lb 6.4 oz)   SpO2 98%   Breastfeeding No   BMI 35.11 kg/m   Estimated body mass index is 35.11 kg/m  as calculated from the following:    Height as of 11/27/21: 1.568 m (5' 1.75\").    Weight as of this encounter: 86.4 kg (190 lb 6.4 oz). .      "

## 2022-06-06 NOTE — PATIENT INSTRUCTIONS
Blood pressure looks much better today.  We'll continue your current medications.    You can try some Pepcid AC for your stomach.  Take it twice daily for a few days and see if it helps.    Please check your medicines.  You should not be taking any aspirin or ibuprofen in the 10 days prior to your surgery.  Acetaminophen (Tylenol) is okay to take.

## 2022-06-06 NOTE — PROGRESS NOTES
"  A/P:      ICD-10-CM    1. Essential hypertension with goal blood pressure less than 140/90  I10 hydrochlorothiazide (HYDRODIURIL) 25 MG tablet     lisinopril (ZESTRIL) 40 MG tablet     Basic metabolic panel  (Ca, Cl, CO2, Creat, Gluc, K, Na, BUN)   2. Hypokalemia  E87.6 potassium chloride ER (KLOR-CON) 20 MEQ CR tablet   3. Nausea  R11.0    4. Severe episode of recurrent major depressive disorder, without psychotic features (H)  F33.2 sertraline (ZOLOFT) 100 MG tablet       HTN:  Control much improved on 1 week of increased lisinopril dose.  Given proximity to surgery and risk of hypotension hesitate to add new BP med at this time.  Continue current meds. And f/u post op  Labs today    Mild nausea, no vomiting.  Trial of famotidine.    Depression:  meds refilled.  Has visits scheduled with psych      Patient Instructions   Blood pressure looks much better today.  We'll continue your current medications.    You can try some Pepcid AC for your stomach.  Take it twice daily for a few days and see if it helps.    Please check your medicines.  You should not be taking any aspirin or ibuprofen in the 10 days prior to your surgery.  Acetaminophen (Tylenol) is okay to take.            Dami Funez is a 66 year old who presents for the following health issues     HPI     Hypertension Follow-up      Do you check your blood pressure regularly outside of the clinic? No     Are you following a low salt diet? No    Are your blood pressures ever more than 140 on the top number (systolic) OR more   than 90 on the bottom number (diastolic), for example 140/90? Not checking     Taking hydrochlorothiazide and lisinopril.  No problems with meds, tolerating just fine.  No HA, CP, SOB or KRAFT      *  Has had a bit of an upset stomach.  Has been ongoing for a couple of weeks.  Does not really feel like she is going to throw up but feels mildly nauseated.  Feels \"unsettled\".      Feels it might get a bit better when she eats.  Has " been avoiding EtOH for a couple of weeks.    Appetite has been pretty normal.  BM has been baseline for her, not daily but has not gotten really backed up, generally goes 3-4 times per week.    Has not tried anything for her stomach.        Review of Systems   Constitutional, HEENT, cardiovascular, pulmonary, gi and gu systems are negative, except as otherwise noted.      Objective    BP (!) 150/82   Pulse 74   Temp 97.2  F (36.2  C) (Tympanic)   Wt 86.4 kg (190 lb 6.4 oz)   SpO2 98%   Breastfeeding No   BMI 35.11 kg/m    Body mass index is 35.11 kg/m .  Physical Exam   PE:  VS as above   Gen:  WN/WD/WH female in NAD      Epic reviewed

## 2022-06-08 NOTE — TELEPHONE ENCOUNTER
Pt has been on the sertraline at this dose for many years (since before I met her).  A medication change/substuition is not appropriate.  We are going to need to appeal this.  Not sure next steps.    Dalia Wu, DO

## 2022-06-13 NOTE — PROVIDER NOTIFICATION
06/13/22 1291   Discharge Planning   Patient/Family Anticipates Transition to home with family   Comment, Barriers to Discharge Would like to go to the [facility] after discharge   Concerns to be Addressed all concerns addressed in this encounter   Living Arrangements   People in Home alone   Type of Residence Private Residence   Is your private residence a single family home or apartment? Single family home   Stair Railings, Within Home, Primary railings safe and in good condition   Once home, are you able to live on one level? Yes   Equipment Currently Used at Home cane, straight   Support System   Support Systems Family Members   Do you have someone available to stay with you one or two nights once you are home? Yes   Blood   Known bleeding disorder or coagulopathy? No   Does the patient have any Sabianism/cultural preferences related to blood products? No   Education   Has the patient scheduled or completed pre-op total joint education, either in class or online, in the last 12 months? No  (Unable to connect)   Patient attended total joint pre-op class/received pre-op teaching  online

## 2022-06-14 ENCOUNTER — LAB (OUTPATIENT)
Dept: LAB | Facility: CLINIC | Age: 67
End: 2022-06-14
Payer: COMMERCIAL

## 2022-06-14 DIAGNOSIS — Z11.59 ENCOUNTER FOR SCREENING FOR OTHER VIRAL DISEASES: ICD-10-CM

## 2022-06-14 PROCEDURE — U0005 INFEC AGEN DETEC AMPLI PROBE: HCPCS

## 2022-06-14 PROCEDURE — U0003 INFECTIOUS AGENT DETECTION BY NUCLEIC ACID (DNA OR RNA); SEVERE ACUTE RESPIRATORY SYNDROME CORONAVIRUS 2 (SARS-COV-2) (CORONAVIRUS DISEASE [COVID-19]), AMPLIFIED PROBE TECHNIQUE, MAKING USE OF HIGH THROUGHPUT TECHNOLOGIES AS DESCRIBED BY CMS-2020-01-R: HCPCS

## 2022-06-15 ENCOUNTER — ANESTHESIA EVENT (OUTPATIENT)
Dept: SURGERY | Facility: CLINIC | Age: 67
End: 2022-06-15
Payer: COMMERCIAL

## 2022-06-15 LAB — SARS-COV-2 RNA RESP QL NAA+PROBE: NEGATIVE

## 2022-06-15 ASSESSMENT — LIFESTYLE VARIABLES: TOBACCO_USE: 1

## 2022-06-15 NOTE — ANESTHESIA PREPROCEDURE EVALUATION
Anesthesia Pre-Procedure Evaluation    Patient: Dee Dee Shepherd   MRN: [id number] : 1955        Procedure : Procedure(s):  Total hip arthroplasty          Past Medical History:   Diagnosis Date     Anxiety      Chronic low back pain      Depressive disorder      Hyperlipidemia      Hypertension      Malignant neoplasm of breast (female) (H)     right     Pap smear abnormality of cervix with cytologic evidence of malignancy      Tobacco abuse       Past Surgical History:   Procedure Laterality Date     COLONOSCOPY N/A 5/3/2021    Procedure: Colonoscopy, With Polypectomy And Biopsy;  Surgeon: Ney Torrez MD;  Location: [facility]     MASTECTOMY       ZZC LIGATE FALLOPIAN TUBE        No Known Allergies   Social History     Tobacco Use     Smoking status: Current Every Day Smoker     Packs/day: 0.50     Years: 35.00     Pack years: 17.50     Types: Cigarettes     Smokeless tobacco: Never Used   Substance Use Topics     Alcohol use: Yes     Comment: weekends drinking more 3-5 drinks      Wt Readings from Last 1 Encounters:   22 86.4 kg (190 lb 6.4 oz)        Anesthesia Evaluation   Pt has had prior anesthetic. Type: General and MAC.    No history of anesthetic complications       ROS/MED HX  ENT/Pulmonary:     (+) tobacco use,     Neurologic: Comment: Chronic low back pain      Cardiovascular:     (+) Dyslipidemia hypertension-----    METS/Exercise Tolerance: >4 METS    Hematologic:       Musculoskeletal:   (+) arthritis,     GI/Hepatic:       Renal/Genitourinary:       Endo:     (+) Obesity,     Psychiatric/Substance Use:     (+) psychiatric history depression     Infectious Disease:       Malignancy:       Other:            Physical Exam    Airway  airway exam normal      Mallampati: II   TM distance: > 3 FB   Neck ROM: full   Mouth opening: > 3 cm    Respiratory Devices and Support         Dental  no notable dental history         Cardiovascular   cardiovascular exam normal          Pulmonary    pulmonary exam normal                OUTSIDE LABS:  CBC:   Lab Results   Component Value Date    WBC 6.8 05/31/2022    WBC 13.6 (H) 03/26/2020    HGB 14.0 05/31/2022    HGB 15.1 03/26/2020    HCT 41.1 05/31/2022    HCT 44.3 03/26/2020     05/31/2022     03/26/2020     BMP:   Lab Results   Component Value Date     06/06/2022     05/31/2022    POTASSIUM 4.5 06/06/2022    POTASSIUM 3.7 05/31/2022    CHLORIDE 109 06/06/2022    CHLORIDE 108 05/31/2022    CO2 26 06/06/2022    CO2 24 05/31/2022    BUN 13 06/06/2022    BUN 13 05/31/2022    CR 0.86 06/06/2022    CR 0.71 05/31/2022    GLC 82 06/06/2022    GLC 96 05/31/2022     COAGS: No results found for: PTT, INR, FIBR  POC: No results found for: BGM, HCG, HCGS  HEPATIC:   Lab Results   Component Value Date    ALBUMIN 3.8 11/16/2021    PROTTOTAL 7.6 11/16/2021    ALT 37 11/16/2021    AST 20 11/16/2021    ALKPHOS 142 11/16/2021    BILITOTAL 0.5 11/16/2021     OTHER:   Lab Results   Component Value Date    A1C 5.7 (H) 11/16/2021    NESTOR 9.3 06/06/2022    PHOS 3.2 12/05/2019    LIPASE 42 (L) 03/26/2020    TSH 4.13 (H) 11/16/2021    T4 0.76 11/16/2021       Anesthesia Plan    ASA Status:  2   NPO Status:  NPO Appropriate    Anesthesia Type: Spinal.   Induction: Propofol.           Consents    Anesthesia Plan(s) and associated risks, benefits, and realistic alternatives discussed. Questions answered and patient/representative(s) expressed understanding.     - Discussed: Risks, Benefits and Alternatives for BOTH SEDATION and the PROCEDURE were discussed     - Discussed with:  Patient         Postoperative Care    Pain management: Oral pain medications.   PONV prophylaxis: Dexamethasone or Solumedrol     Comments:                DELORES Haywood CRNA

## 2022-06-16 ENCOUNTER — ANESTHESIA (OUTPATIENT)
Dept: SURGERY | Facility: CLINIC | Age: 67
End: 2022-06-16
Payer: COMMERCIAL

## 2022-06-16 ENCOUNTER — HOSPITAL ENCOUNTER (OUTPATIENT)
Facility: CLINIC | Age: 67
Discharge: HOME | End: 2022-06-17
Attending: ORTHOPAEDIC SURGERY | Admitting: ORTHOPAEDIC SURGERY
Payer: COMMERCIAL

## 2022-06-16 ENCOUNTER — APPOINTMENT (OUTPATIENT)
Dept: GENERAL RADIOLOGY | Facility: CLINIC | Age: 67
End: 2022-06-16
Attending: ORTHOPAEDIC SURGERY
Payer: COMMERCIAL

## 2022-06-16 DIAGNOSIS — M16.11 PRIMARY OSTEOARTHRITIS OF RIGHT HIP: Primary | ICD-10-CM

## 2022-06-16 PROBLEM — Z96.649 STATUS POST TOTAL REPLACEMENT OF HIP: Status: ACTIVE | Noted: 2022-06-16

## 2022-06-16 PROCEDURE — 258N000003 HC RX IP 258 OP 636: Performed by: NURSE ANESTHETIST, CERTIFIED REGISTERED

## 2022-06-16 PROCEDURE — 250N000013 HC RX MED GY IP 250 OP 250 PS 637: Performed by: NURSE ANESTHETIST, CERTIFIED REGISTERED

## 2022-06-16 PROCEDURE — 250N000011 HC RX IP 250 OP 636: Performed by: NURSE ANESTHETIST, CERTIFIED REGISTERED

## 2022-06-16 PROCEDURE — 710N000009 HC RECOVERY PHASE 1, LEVEL 1, PER MIN: Performed by: ORTHOPAEDIC SURGERY

## 2022-06-16 PROCEDURE — 250N000013 HC RX MED GY IP 250 OP 250 PS 637: Performed by: PHYSICIAN ASSISTANT

## 2022-06-16 PROCEDURE — 999N000065 XR PELVIS PORT 1/2 VIEWS

## 2022-06-16 PROCEDURE — 250N000009 HC RX 250: Performed by: NURSE ANESTHETIST, CERTIFIED REGISTERED

## 2022-06-16 PROCEDURE — C1776 JOINT DEVICE (IMPLANTABLE): HCPCS | Performed by: ORTHOPAEDIC SURGERY

## 2022-06-16 PROCEDURE — 370N000017 HC ANESTHESIA TECHNICAL FEE, PER MIN: Performed by: ORTHOPAEDIC SURGERY

## 2022-06-16 PROCEDURE — 250N000013 HC RX MED GY IP 250 OP 250 PS 637: Performed by: ORTHOPAEDIC SURGERY

## 2022-06-16 PROCEDURE — 271N000001 HC OR GENERAL SUPPLY NON-STERILE: Performed by: ORTHOPAEDIC SURGERY

## 2022-06-16 PROCEDURE — 250N000011 HC RX IP 250 OP 636

## 2022-06-16 PROCEDURE — 272N000001 HC OR GENERAL SUPPLY STERILE: Performed by: ORTHOPAEDIC SURGERY

## 2022-06-16 PROCEDURE — 250N000011 HC RX IP 250 OP 636: Performed by: ORTHOPAEDIC SURGERY

## 2022-06-16 PROCEDURE — 258N000003 HC RX IP 258 OP 636

## 2022-06-16 PROCEDURE — 258N000003 HC RX IP 258 OP 636: Performed by: ORTHOPAEDIC SURGERY

## 2022-06-16 PROCEDURE — 250N000011 HC RX IP 250 OP 636: Performed by: PHYSICIAN ASSISTANT

## 2022-06-16 PROCEDURE — 360N000077 HC SURGERY LEVEL 4, PER MIN: Performed by: ORTHOPAEDIC SURGERY

## 2022-06-16 PROCEDURE — 999N000141 HC STATISTIC PRE-PROCEDURE NURSING ASSESSMENT: Performed by: ORTHOPAEDIC SURGERY

## 2022-06-16 PROCEDURE — 250N000009 HC RX 250

## 2022-06-16 DEVICE — IMPLANTABLE DEVICE: Type: IMPLANTABLE DEVICE | Site: HIP | Status: FUNCTIONAL

## 2022-06-16 DEVICE — IMP INSERT HIP DEPUY PINNACLE ALTRX 36X54MM 1221-36-054: Type: IMPLANTABLE DEVICE | Site: HIP | Status: FUNCTIONAL

## 2022-06-16 DEVICE — IMP HEAD FEMORAL DEPUY CERAMIC 36MM +1.5MM 1365-36-310: Type: IMPLANTABLE DEVICE | Site: HIP | Status: FUNCTIONAL

## 2022-06-16 DEVICE — IMP CUP ACE PINNACLE 54MM 1217-22-054: Type: IMPLANTABLE DEVICE | Site: HIP | Status: FUNCTIONAL

## 2022-06-16 RX ORDER — ACETAMINOPHEN 325 MG/1
650 TABLET ORAL EVERY 4 HOURS PRN
Status: DISCONTINUED | OUTPATIENT
Start: 2022-06-19 | End: 2022-06-17 | Stop reason: HOSPADM

## 2022-06-16 RX ORDER — ONDANSETRON 2 MG/ML
4 INJECTION INTRAMUSCULAR; INTRAVENOUS EVERY 30 MIN PRN
Status: DISCONTINUED | OUTPATIENT
Start: 2022-06-16 | End: 2022-06-16 | Stop reason: HOSPADM

## 2022-06-16 RX ORDER — MAGNESIUM SULFATE HEPTAHYDRATE 40 MG/ML
2 INJECTION, SOLUTION INTRAVENOUS ONCE
Status: DISCONTINUED | OUTPATIENT
Start: 2022-06-16 | End: 2022-06-16 | Stop reason: HOSPADM

## 2022-06-16 RX ORDER — PROPOFOL 10 MG/ML
INJECTION, EMULSION INTRAVENOUS PRN
Status: DISCONTINUED | OUTPATIENT
Start: 2022-06-16 | End: 2022-06-16

## 2022-06-16 RX ORDER — CEFAZOLIN SODIUM/WATER 2 G/20 ML
2 SYRINGE (ML) INTRAVENOUS SEE ADMIN INSTRUCTIONS
Status: DISCONTINUED | OUTPATIENT
Start: 2022-06-16 | End: 2022-06-16 | Stop reason: HOSPADM

## 2022-06-16 RX ORDER — MEPERIDINE HYDROCHLORIDE 25 MG/ML
12.5 INJECTION INTRAMUSCULAR; INTRAVENOUS; SUBCUTANEOUS
Status: DISCONTINUED | OUTPATIENT
Start: 2022-06-16 | End: 2022-06-16 | Stop reason: HOSPADM

## 2022-06-16 RX ORDER — NALOXONE HYDROCHLORIDE 0.4 MG/ML
0.2 INJECTION, SOLUTION INTRAMUSCULAR; INTRAVENOUS; SUBCUTANEOUS
Status: DISCONTINUED | OUTPATIENT
Start: 2022-06-16 | End: 2022-06-17 | Stop reason: HOSPADM

## 2022-06-16 RX ORDER — LIDOCAINE HYDROCHLORIDE 10 MG/ML
INJECTION, SOLUTION INFILTRATION; PERINEURAL PRN
Status: DISCONTINUED | OUTPATIENT
Start: 2022-06-16 | End: 2022-06-16

## 2022-06-16 RX ORDER — CEFAZOLIN SODIUM 2 G/100ML
2 INJECTION, SOLUTION INTRAVENOUS EVERY 8 HOURS
Status: COMPLETED | OUTPATIENT
Start: 2022-06-16 | End: 2022-06-17

## 2022-06-16 RX ORDER — SODIUM CHLORIDE, SODIUM LACTATE, POTASSIUM CHLORIDE, CALCIUM CHLORIDE 600; 310; 30; 20 MG/100ML; MG/100ML; MG/100ML; MG/100ML
INJECTION, SOLUTION INTRAVENOUS CONTINUOUS
Status: DISCONTINUED | OUTPATIENT
Start: 2022-06-16 | End: 2022-06-16 | Stop reason: HOSPADM

## 2022-06-16 RX ORDER — LIDOCAINE 40 MG/G
CREAM TOPICAL
Status: DISCONTINUED | OUTPATIENT
Start: 2022-06-16 | End: 2022-06-16 | Stop reason: HOSPADM

## 2022-06-16 RX ORDER — KETOROLAC TROMETHAMINE 15 MG/ML
15 INJECTION, SOLUTION INTRAMUSCULAR; INTRAVENOUS EVERY 6 HOURS
Status: DISPENSED | OUTPATIENT
Start: 2022-06-16 | End: 2022-06-17

## 2022-06-16 RX ORDER — PROPOFOL 10 MG/ML
INJECTION, EMULSION INTRAVENOUS CONTINUOUS PRN
Status: DISCONTINUED | OUTPATIENT
Start: 2022-06-16 | End: 2022-06-16

## 2022-06-16 RX ORDER — POLYETHYLENE GLYCOL 3350 17 G/17G
17 POWDER, FOR SOLUTION ORAL DAILY
Status: DISCONTINUED | OUTPATIENT
Start: 2022-06-17 | End: 2022-06-17 | Stop reason: HOSPADM

## 2022-06-16 RX ORDER — LIDOCAINE 40 MG/G
CREAM TOPICAL
Status: DISCONTINUED | OUTPATIENT
Start: 2022-06-16 | End: 2022-06-17 | Stop reason: HOSPADM

## 2022-06-16 RX ORDER — ONDANSETRON 4 MG/1
4 TABLET, ORALLY DISINTEGRATING ORAL EVERY 30 MIN PRN
Status: DISCONTINUED | OUTPATIENT
Start: 2022-06-16 | End: 2022-06-16 | Stop reason: HOSPADM

## 2022-06-16 RX ORDER — OXYCODONE HYDROCHLORIDE 5 MG/1
5-10 TABLET ORAL EVERY 4 HOURS PRN
Qty: 26 TABLET | Refills: 0 | Status: SHIPPED | OUTPATIENT
Start: 2022-06-16 | End: 2022-06-17

## 2022-06-16 RX ORDER — ONDANSETRON 2 MG/ML
4 INJECTION INTRAMUSCULAR; INTRAVENOUS EVERY 6 HOURS PRN
Status: DISCONTINUED | OUTPATIENT
Start: 2022-06-16 | End: 2022-06-17 | Stop reason: HOSPADM

## 2022-06-16 RX ORDER — BUPIVACAINE HYDROCHLORIDE 7.5 MG/ML
INJECTION, SOLUTION INTRASPINAL PRN
Status: DISCONTINUED | OUTPATIENT
Start: 2022-06-16 | End: 2022-06-16

## 2022-06-16 RX ORDER — ACETAMINOPHEN 325 MG/1
975 TABLET ORAL EVERY 8 HOURS
Status: DISCONTINUED | OUTPATIENT
Start: 2022-06-16 | End: 2022-06-17 | Stop reason: HOSPADM

## 2022-06-16 RX ORDER — ACETAMINOPHEN 325 MG/1
650 TABLET ORAL EVERY 4 HOURS PRN
Qty: 100 TABLET | Refills: 0 | Status: SHIPPED | OUTPATIENT
Start: 2022-06-16 | End: 2023-03-14

## 2022-06-16 RX ORDER — AMOXICILLIN 250 MG
1-2 CAPSULE ORAL DAILY PRN
Qty: 15 TABLET | Refills: 0 | Status: SHIPPED | OUTPATIENT
Start: 2022-06-16 | End: 2022-10-12

## 2022-06-16 RX ORDER — ONDANSETRON 2 MG/ML
INJECTION INTRAMUSCULAR; INTRAVENOUS PRN
Status: DISCONTINUED | OUTPATIENT
Start: 2022-06-16 | End: 2022-06-16

## 2022-06-16 RX ORDER — TRANEXAMIC ACID 650 MG/1
1950 TABLET ORAL ONCE
Status: COMPLETED | OUTPATIENT
Start: 2022-06-16 | End: 2022-06-16

## 2022-06-16 RX ORDER — HYDROMORPHONE HCL IN WATER/PF 6 MG/30 ML
0.2 PATIENT CONTROLLED ANALGESIA SYRINGE INTRAVENOUS EVERY 5 MIN PRN
Status: DISCONTINUED | OUTPATIENT
Start: 2022-06-16 | End: 2022-06-16 | Stop reason: HOSPADM

## 2022-06-16 RX ORDER — PROCHLORPERAZINE MALEATE 5 MG
5 TABLET ORAL EVERY 6 HOURS PRN
Status: DISCONTINUED | OUTPATIENT
Start: 2022-06-16 | End: 2022-06-17 | Stop reason: HOSPADM

## 2022-06-16 RX ORDER — OXYCODONE HYDROCHLORIDE 5 MG/1
5 TABLET ORAL EVERY 4 HOURS PRN
Status: DISCONTINUED | OUTPATIENT
Start: 2022-06-16 | End: 2022-06-16 | Stop reason: HOSPADM

## 2022-06-16 RX ORDER — EPHEDRINE SULFATE 50 MG/ML
INJECTION, SOLUTION INTRAMUSCULAR; INTRAVENOUS; SUBCUTANEOUS PRN
Status: DISCONTINUED | OUTPATIENT
Start: 2022-06-16 | End: 2022-06-16

## 2022-06-16 RX ORDER — AMOXICILLIN 250 MG
1 CAPSULE ORAL 2 TIMES DAILY
Status: DISCONTINUED | OUTPATIENT
Start: 2022-06-16 | End: 2022-06-17 | Stop reason: HOSPADM

## 2022-06-16 RX ORDER — HYDROXYZINE HYDROCHLORIDE 25 MG/1
25 TABLET, FILM COATED ORAL EVERY 6 HOURS PRN
Qty: 30 TABLET | Refills: 0 | Status: SHIPPED | OUTPATIENT
Start: 2022-06-16 | End: 2022-06-17

## 2022-06-16 RX ORDER — ACETAMINOPHEN 325 MG/1
975 TABLET ORAL ONCE
Status: COMPLETED | OUTPATIENT
Start: 2022-06-16 | End: 2022-06-16

## 2022-06-16 RX ORDER — OXYCODONE HYDROCHLORIDE 5 MG/1
5 TABLET ORAL EVERY 4 HOURS PRN
Status: DISCONTINUED | OUTPATIENT
Start: 2022-06-16 | End: 2022-06-17 | Stop reason: HOSPADM

## 2022-06-16 RX ORDER — BISACODYL 10 MG
10 SUPPOSITORY, RECTAL RECTAL DAILY PRN
Status: DISCONTINUED | OUTPATIENT
Start: 2022-06-16 | End: 2022-06-17 | Stop reason: HOSPADM

## 2022-06-16 RX ORDER — FENTANYL CITRATE 50 UG/ML
25 INJECTION, SOLUTION INTRAMUSCULAR; INTRAVENOUS
Status: CANCELLED | OUTPATIENT
Start: 2022-06-16

## 2022-06-16 RX ORDER — SODIUM CHLORIDE, SODIUM LACTATE, POTASSIUM CHLORIDE, CALCIUM CHLORIDE 600; 310; 30; 20 MG/100ML; MG/100ML; MG/100ML; MG/100ML
INJECTION, SOLUTION INTRAVENOUS CONTINUOUS
Status: DISCONTINUED | OUTPATIENT
Start: 2022-06-16 | End: 2022-06-17 | Stop reason: HOSPADM

## 2022-06-16 RX ORDER — ONDANSETRON 4 MG/1
4 TABLET, ORALLY DISINTEGRATING ORAL EVERY 6 HOURS PRN
Status: DISCONTINUED | OUTPATIENT
Start: 2022-06-16 | End: 2022-06-17 | Stop reason: HOSPADM

## 2022-06-16 RX ORDER — NALOXONE HYDROCHLORIDE 0.4 MG/ML
0.4 INJECTION, SOLUTION INTRAMUSCULAR; INTRAVENOUS; SUBCUTANEOUS
Status: DISCONTINUED | OUTPATIENT
Start: 2022-06-16 | End: 2022-06-17 | Stop reason: HOSPADM

## 2022-06-16 RX ORDER — HYDROMORPHONE HCL IN WATER/PF 6 MG/30 ML
0.2 PATIENT CONTROLLED ANALGESIA SYRINGE INTRAVENOUS
Status: DISCONTINUED | OUTPATIENT
Start: 2022-06-16 | End: 2022-06-17 | Stop reason: HOSPADM

## 2022-06-16 RX ORDER — HYDROMORPHONE HCL IN WATER/PF 6 MG/30 ML
0.4 PATIENT CONTROLLED ANALGESIA SYRINGE INTRAVENOUS
Status: DISCONTINUED | OUTPATIENT
Start: 2022-06-16 | End: 2022-06-17 | Stop reason: HOSPADM

## 2022-06-16 RX ORDER — FENTANYL CITRATE 50 UG/ML
25 INJECTION, SOLUTION INTRAMUSCULAR; INTRAVENOUS EVERY 5 MIN PRN
Status: DISCONTINUED | OUTPATIENT
Start: 2022-06-16 | End: 2022-06-16 | Stop reason: HOSPADM

## 2022-06-16 RX ORDER — DEXAMETHASONE SODIUM PHOSPHATE 4 MG/ML
INJECTION, SOLUTION INTRA-ARTICULAR; INTRALESIONAL; INTRAMUSCULAR; INTRAVENOUS; SOFT TISSUE PRN
Status: DISCONTINUED | OUTPATIENT
Start: 2022-06-16 | End: 2022-06-16

## 2022-06-16 RX ORDER — CEFAZOLIN SODIUM/WATER 2 G/20 ML
2 SYRINGE (ML) INTRAVENOUS
Status: COMPLETED | OUTPATIENT
Start: 2022-06-16 | End: 2022-06-16

## 2022-06-16 RX ORDER — HYDROXYZINE HYDROCHLORIDE 25 MG/1
25 TABLET, FILM COATED ORAL EVERY 6 HOURS PRN
Status: DISCONTINUED | OUTPATIENT
Start: 2022-06-16 | End: 2022-06-17 | Stop reason: HOSPADM

## 2022-06-16 RX ORDER — OXYCODONE HYDROCHLORIDE 5 MG/1
10 TABLET ORAL EVERY 4 HOURS PRN
Status: DISCONTINUED | OUTPATIENT
Start: 2022-06-16 | End: 2022-06-17 | Stop reason: HOSPADM

## 2022-06-16 RX ADMIN — DEXAMETHASONE SODIUM PHOSPHATE 4 MG: 4 INJECTION, SOLUTION INTRA-ARTICULAR; INTRALESIONAL; INTRAMUSCULAR; INTRAVENOUS; SOFT TISSUE at 13:29

## 2022-06-16 RX ADMIN — ASPIRIN 325 MG: 325 TABLET, COATED ORAL at 16:42

## 2022-06-16 RX ADMIN — TRANEXAMIC ACID 1950 MG: 650 TABLET ORAL at 10:51

## 2022-06-16 RX ADMIN — PROPOFOL 60 MG: 10 INJECTION, EMULSION INTRAVENOUS at 13:18

## 2022-06-16 RX ADMIN — CEFAZOLIN SODIUM 2 G: 2 INJECTION, SOLUTION INTRAVENOUS at 21:41

## 2022-06-16 RX ADMIN — SODIUM CHLORIDE, POTASSIUM CHLORIDE, SODIUM LACTATE AND CALCIUM CHLORIDE: 600; 310; 30; 20 INJECTION, SOLUTION INTRAVENOUS at 16:14

## 2022-06-16 RX ADMIN — Medication 10 MG: at 13:56

## 2022-06-16 RX ADMIN — ACETAMINOPHEN 975 MG: 325 TABLET, FILM COATED ORAL at 10:52

## 2022-06-16 RX ADMIN — PHENYLEPHRINE HYDROCHLORIDE 100 MCG: 10 INJECTION INTRAVENOUS at 13:33

## 2022-06-16 RX ADMIN — PROPOFOL 150 MCG/KG/MIN: 10 INJECTION, EMULSION INTRAVENOUS at 13:16

## 2022-06-16 RX ADMIN — OXYCODONE HYDROCHLORIDE 5 MG: 5 TABLET ORAL at 18:14

## 2022-06-16 RX ADMIN — ONDANSETRON 4 MG: 2 INJECTION INTRAMUSCULAR; INTRAVENOUS at 13:29

## 2022-06-16 RX ADMIN — BUPIVACAINE HYDROCHLORIDE IN DEXTROSE 1.4 ML: 7.5 INJECTION, SOLUTION SUBARACHNOID at 13:13

## 2022-06-16 RX ADMIN — PHENYLEPHRINE HYDROCHLORIDE 100 MCG: 10 INJECTION INTRAVENOUS at 14:04

## 2022-06-16 RX ADMIN — KETOROLAC TROMETHAMINE 15 MG: 15 INJECTION, SOLUTION INTRAMUSCULAR; INTRAVENOUS at 21:40

## 2022-06-16 RX ADMIN — SODIUM CHLORIDE, POTASSIUM CHLORIDE, SODIUM LACTATE AND CALCIUM CHLORIDE: 600; 310; 30; 20 INJECTION, SOLUTION INTRAVENOUS at 11:01

## 2022-06-16 RX ADMIN — ACETAMINOPHEN 975 MG: 325 TABLET, FILM COATED ORAL at 18:14

## 2022-06-16 RX ADMIN — Medication 2 G: at 12:58

## 2022-06-16 RX ADMIN — SENNOSIDES AND DOCUSATE SODIUM 1 TABLET: 50; 8.6 TABLET ORAL at 21:40

## 2022-06-16 RX ADMIN — KETOROLAC TROMETHAMINE 15 MG: 15 INJECTION, SOLUTION INTRAMUSCULAR; INTRAVENOUS at 16:42

## 2022-06-16 RX ADMIN — LIDOCAINE HYDROCHLORIDE 3 MG: 10 INJECTION, SOLUTION INFILTRATION; PERINEURAL at 13:10

## 2022-06-16 RX ADMIN — PHENYLEPHRINE HYDROCHLORIDE 100 MCG: 10 INJECTION INTRAVENOUS at 13:44

## 2022-06-16 NOTE — ANESTHESIA CARE TRANSFER NOTE
Patient: Dee Dee Shepherd    Procedure: Procedure(s):  Total hip arthroplasty. Right       Diagnosis: Arthritis of right hip [M16.11]  Diagnosis Additional Information: No value filed.    Anesthesia Type:   Spinal     Note:    Oropharynx: oropharynx clear of all foreign objects and spontaneously breathing  Level of Consciousness: awake  Oxygen Supplementation: room air      Dentition: dentition unchanged  Vital Signs Stable: post-procedure vital signs reviewed and stable  Report to RN Given: handoff report given  Patient transferred to: PACU    Handoff Report: Identifed the Patient, Identified the Reponsible Provider, Reviewed the pertinent medical history, Discussed the surgical course, Reviewed Intra-OP anesthesia mangement and issues during anesthesia, Set expectations for post-procedure period and Allowed opportunity for questions and acknowledgement of understanding      Vitals:  Vitals Value Taken Time   /60 06/16/22 1435   Temp     Pulse 75 06/16/22 1439   Resp 16 06/16/22 1439   SpO2 99 % 06/16/22 1439   Vitals shown include unvalidated device data.    Electronically Signed By: DELORES Catalan CRNA  June 16, 2022  2:41 PM

## 2022-06-16 NOTE — ANESTHESIA POSTPROCEDURE EVALUATION
Patient: Dee Dee Shepherd    Procedure: Procedure(s):  Total hip arthroplasty. Right       Anesthesia Type:  Spinal    Note:  Disposition: Inpatient   Postop Pain Control: Uneventful            Sign Out: Well controlled pain   PONV: No   Neuro/Psych: Uneventful            Sign Out: Acceptable/Baseline neuro status   Airway/Respiratory: Uneventful            Sign Out: Acceptable/Baseline resp. status   CV/Hemodynamics: Uneventful            Sign Out: Acceptable CV status; No obvious hypovolemia; No obvious fluid overload   Other NRE: NONE   DID A NON-ROUTINE EVENT OCCUR? No           Last vitals:  Vitals Value Taken Time   /88 06/16/22 1530   Temp 36.7  C (98.1  F) 06/16/22 1500   Pulse 70 06/16/22 1536   Resp 14 06/16/22 1536   SpO2 100 % 06/16/22 1536   Vitals shown include unvalidated device data.    Electronically Signed By: DELORES Catalan CRNA  June 16, 2022  5:10 PM

## 2022-06-16 NOTE — PROGRESS NOTES
"[facility] ADMISSION NOTE    Patient admitted to room 2303 at approximately 1600 via cart from surgery. Patient was accompanied by nurse.     Verbal SBAR report received from Courtney prior to patient arrival.     Patient trasferred to bed via air itzel. Patient alert and oriented X 3. The patient is not having any pain.  . Admission vital signs: Blood pressure (!) 150/76, pulse 74, temperature 97.7  F (36.5  C), temperature source Oral, resp. rate 16, height 1.575 m (5' 2\"), weight 86.2 kg (190 lb), SpO2 99 %, not currently breastfeeding. Patient was oriented to plan of care, call light, bed controls, tv, telephone, bathroom and visiting hours.     Risk Assessment    The following safety risks were identified during admission: fall. Yellow risk band applied: YES.     Skin Initial Assessment    This writer admitted this patient and completed a full skin assessment and Ever score in the Adult PCS flowsheet. Appropriate interventions initiated as needed.     Secondary skin check completed by Denisse Ibarra RN.           Jennie Landry RN    "

## 2022-06-16 NOTE — INTERVAL H&P NOTE
"I have reviewed the surgical (or preoperative) H&P that is linked to this encounter, and examined the patient. There are no significant changes    Clinical Conditions Present on Arrival:  Clinically Significant Risk Factors Present on Admission                   # Obesity: Estimated body mass index is 34.75 kg/m  as calculated from the following:    Height as of this encounter: 1.575 m (5' 2\").    Weight as of this encounter: 86.2 kg (190 lb).       "

## 2022-06-16 NOTE — PROVIDER NOTIFICATION
06/16/22 1536   Discharge/Handoff   Transport Nurse RYLEE Mosley NSIVAN   Handoff given to Danilo Swift RN   Oxygen Therapy   SpO2 100 %   Device (Oxygen Therapy) none (room air)   Valuables   Patient Belongings remains with patient   Patient Belongings Remaining with Patient clothing;shoes;glasses;purse/wallet;tote   Number of Belongings Bags 2   Did you bring any home meds/supplements to the hospital?  No     Afebrile. Vitals stable. Denies pain. Sisters Milagro and Erica updated on patient's status in pacu and upon transfer to floor.

## 2022-06-16 NOTE — PLAN OF CARE
Patient has full sensation in lower legs. Dressing on right hip clean, dry and intact. CMS intact.Given tylenol, toradol, and oxycodone 5 mg for pain rated 5/10. Ice applied. Voided once incontinent. Tolerating a regular diet. Saline locked.

## 2022-06-16 NOTE — ANESTHESIA PROCEDURE NOTES
Intrathecal injection Procedure Note    Pre-Procedure   Staff -        CRNA: Kevin Rosenberg APRN CRNA       Other Anesthesia Staff: Angelina Castillo       Performed By: CRNA and SRNA       Location: OR       Pre-Anesthestic Checklist: patient identified, IV checked, risks and benefits discussed, informed consent, monitors and equipment checked, pre-op evaluation, at physician/surgeon's request and post-op pain management  Timeout:       Correct Patient: Yes        Correct Procedure: Yes        Correct Site: Yes        Correct Position: Yes   Procedure Documentation  Procedure: intrathecal injection       Patient Position: sitting       Skin prep: Chloraprep       Insertion Site: L3-4. (midline approach).       Needle Gauge: 25.        Needle Length (Inches): 3.5        Spinal Needle Type: Pencan       Introducer used       Introducer: 20 G       # of attempts: 1 and  # of redirects:  1    Assessment/Narrative         Paresthesias: No.       CSF fluid: clear.

## 2022-06-16 NOTE — PROCEDURES
06/16/22    PREOP DIAGNOSIS: Right hip arthritis  POSTOP DIAGNOSIS: Right hip arthritis  PROCEDURE: Right total hip arthroplasty  SURGEON: Akira Hernandez   ASSISTANT: Milo Jaramillo.  The presence of a PA was necessary for positioning, retraction, and safe progression of the case  ANESTHESIA: Spinal with sedation   EBL: 100cc  COMPLICATIONS: None apparent   DISPO: Stable to PACU     IMPLANTS: DePuy Actis size 1 offset collared femur, 36mm x 1.5mm ceramic femoral head, 36mm x 54mm neutral polyethylene liner, and 54mm [place] Cup    INDICATIONS: Dee Dee is a 66 year old female with a history of progressive right hip pain due to end stage arthritis.  After failing nonoperative management, she elected to proceed with a right SREE, understanding the risks of infection, damage to vessels or nerves, blood clots, instability, leg length discrepancy, ongoing pain and need for revision surgery.     PROCEDURE: Dee Dee was met in the preoperative holding area where consent was reviewed and the right hip was marked.  She was then transferred to the operating theater. After a spinal anesthetic was placed, she was placed in a left lateral decubitus position with her right side up. All bony prominences were padded, she was secured with a Stulberg hip positioner, and an axillary roll was placed.  A timeout was performed verifying the correct patient, surgery, and location. She received preoperative antibiotics as well as transexamic acid. The right lower extremity was then prepped and draped in a standard fashion.     We then made an incision in line with a posterior approach to the hip. Dissection was sharply carried down through skin and subcutaneous tissue, and the gluteus fascia incised in line with the incision. After palpating and protecting the sciatic nerve, an east west retractor was placed. We then released the piriformis and short external rotators and then performed a T Capsulotomy. The hip was dislocated and a neck cut  made, approximately 8 mm proximal to the lesser trochanter. The femoral head measured 49mm and showed full thickness chondral wear with multiple osteophytes.      We turned our attention to the acetabulum. She had significant osteophytes around the anterior and inferior acetabulum.  After placing anterior and posterior retractors, foveal and labral tissue were removed.  We started with a 50mm reamer and sequentially reamed to a 54, in approximately 40 deg of abduction and 25 deg of anteversion. At that point, we had good bleeding bone circumferentially.  A trial cup was placed with good pressfit followed by our final 54mm cup, again with good press fit, and a neutral liner. We then turned our attention to the femur. After using a cookie cutter and canal finder, we broached to a size 1, keeping the stem in approximatley 10-15 deg of anteversion. At that point, we had excellent rotational stability.  We then placed a standard offset neck with and a variety of different length femoral heads.  We felt the +1.5mm head most appropriate which showed leg lengths felt appropriate, the hip was stable in full extension and maximal external rotation, in the sleeping position, and with flexion to 90 degrees and internal rotation to 80 degrees.     We then removed all trial components and thoroughly irrigated the wound. We placed our final size 1 stem.  We impacted our final head. The wound was instilled with a dilute betadine solution. Capsule and short external rotators were repaired with 0-ethibond, gluteus fascia with #1 stratafix, subdermal sutures with 2-0 vicryl, and a running 3-0 subcuticular monocryl. A sterile dressing followed by an abductor pillow was placed and the patient was transferred to the PACU in stable condition.       POSTOPERATIVE PLAN:   1. WBAT right LE with PT for mobilization  2. DVT prophylaxis: ASA 325mg daily x 30 days  3. Perioperative antibiotics     Akira Hernandez MD

## 2022-06-16 NOTE — PROGRESS NOTES
Skin affirmation note    Admitting agrees with admit skin assessment and initial findings. Unable to assess under dressing.

## 2022-06-17 ENCOUNTER — APPOINTMENT (OUTPATIENT)
Dept: OCCUPATIONAL THERAPY | Facility: CLINIC | Age: 67
End: 2022-06-17
Attending: ORTHOPAEDIC SURGERY
Payer: COMMERCIAL

## 2022-06-17 ENCOUNTER — APPOINTMENT (OUTPATIENT)
Dept: PHYSICAL THERAPY | Facility: CLINIC | Age: 67
End: 2022-06-17
Attending: ORTHOPAEDIC SURGERY
Payer: COMMERCIAL

## 2022-06-17 VITALS
OXYGEN SATURATION: 98 % | HEART RATE: 80 BPM | WEIGHT: 190 LBS | TEMPERATURE: 98.5 F | DIASTOLIC BLOOD PRESSURE: 54 MMHG | BODY MASS INDEX: 34.96 KG/M2 | HEIGHT: 62 IN | SYSTOLIC BLOOD PRESSURE: 133 MMHG | RESPIRATION RATE: 18 BRPM

## 2022-06-17 LAB
HGB BLD-MCNC: 11.5 G/DL (ref 11.7–15.7)
HOLD SPECIMEN: NORMAL

## 2022-06-17 PROCEDURE — 97161 PT EVAL LOW COMPLEX 20 MIN: CPT | Mod: GP

## 2022-06-17 PROCEDURE — 250N000013 HC RX MED GY IP 250 OP 250 PS 637: Performed by: ORTHOPAEDIC SURGERY

## 2022-06-17 PROCEDURE — 250N000011 HC RX IP 250 OP 636: Performed by: ORTHOPAEDIC SURGERY

## 2022-06-17 PROCEDURE — 36415 COLL VENOUS BLD VENIPUNCTURE: CPT | Performed by: ORTHOPAEDIC SURGERY

## 2022-06-17 PROCEDURE — 85018 HEMOGLOBIN: CPT | Performed by: ORTHOPAEDIC SURGERY

## 2022-06-17 PROCEDURE — 97110 THERAPEUTIC EXERCISES: CPT | Mod: GP

## 2022-06-17 PROCEDURE — 97116 GAIT TRAINING THERAPY: CPT | Mod: GP

## 2022-06-17 PROCEDURE — 97165 OT EVAL LOW COMPLEX 30 MIN: CPT | Mod: GO

## 2022-06-17 RX ORDER — HYDROXYZINE HYDROCHLORIDE 25 MG/1
25 TABLET, FILM COATED ORAL EVERY 6 HOURS PRN
Qty: 20 TABLET | Refills: 0 | Status: SHIPPED | OUTPATIENT
Start: 2022-06-17 | End: 2023-03-14

## 2022-06-17 RX ORDER — OXYCODONE HYDROCHLORIDE 5 MG/1
5-10 TABLET ORAL EVERY 4 HOURS PRN
Qty: 26 TABLET | Refills: 0
Start: 2022-06-17 | End: 2022-10-12 | Stop reason: ALTCHOICE

## 2022-06-17 RX ADMIN — ACETAMINOPHEN 975 MG: 325 TABLET, FILM COATED ORAL at 02:57

## 2022-06-17 RX ADMIN — OXYCODONE HYDROCHLORIDE 5 MG: 5 TABLET ORAL at 11:10

## 2022-06-17 RX ADMIN — ASPIRIN 325 MG: 325 TABLET, COATED ORAL at 08:45

## 2022-06-17 RX ADMIN — SENNOSIDES AND DOCUSATE SODIUM 1 TABLET: 50; 8.6 TABLET ORAL at 08:45

## 2022-06-17 RX ADMIN — OXYCODONE HYDROCHLORIDE 5 MG: 5 TABLET ORAL at 08:44

## 2022-06-17 RX ADMIN — KETOROLAC TROMETHAMINE 15 MG: 15 INJECTION, SOLUTION INTRAMUSCULAR; INTRAVENOUS at 05:26

## 2022-06-17 RX ADMIN — CEFAZOLIN SODIUM 2 G: 2 INJECTION, SOLUTION INTRAVENOUS at 05:26

## 2022-06-17 RX ADMIN — OXYCODONE HYDROCHLORIDE 10 MG: 5 TABLET ORAL at 16:03

## 2022-06-17 RX ADMIN — ACETAMINOPHEN 975 MG: 325 TABLET, FILM COATED ORAL at 11:10

## 2022-06-17 RX ADMIN — OXYCODONE HYDROCHLORIDE 5 MG: 5 TABLET ORAL at 02:57

## 2022-06-17 ASSESSMENT — ACTIVITIES OF DAILY LIVING (ADL): DEPENDENT_IADLS:: INDEPENDENT

## 2022-06-17 NOTE — DISCHARGE SUMMARY
[facility] Orthopedics Discharge Summary                                  [facility]     SILVIANO ROSE [id number]   Age: 66 year old  PCP: Dalia Wu, [phone] 1955     Date of Admission:  6/16/2022  Date of Discharge::  6/17/2022  4:45 PM  Discharge Physician:  Dk Christy PA-C    Code status:  Prior    Admission Information:  Admission Diagnosis:  Arthritis of right hip [M16.11]  Status post total replacement of hip [Z96.649]    Post-Operative Day: 4 Days Post-Op     Reason for admission:  The patient was admitted for the following:Procedure(s) (LRB):  Total hip arthroplasty. Right (Right)    Active Problems:    Status post total replacement of hip      Allergies:  Patient has no known allergies.    Following the procedure noted above the patient was transferred to the post-op floor and started on:    Therapy:  physical therapy  Anticoagulation Plan: ASA 325mg daily for 30 days  Pain Management:  oxycodone and tylenol  Weight bearing status: Weight bearing as tolerated     The patient was followed and co-managed by the hospitalist service during the inpatient treatment course  Complications:  None  Consultations:  None     Pertinent Labs   Lab Results: personally reviewed.     Recent Labs   Lab Test 06/17/22  0449 06/06/22  1422 05/31/22  1433 11/16/21  1559 09/17/20  0759 03/26/20  1317   HGB 11.5*  --  14.0  --   --  15.1   HCT  --   --  41.1  --   --  44.3   MCV  --   --  98  --   --  98   PLT  --   --  226  --   --  324   NA  --  138 139 138   < > 139    < > = values in this interval not displayed.          Discharge Information:  Condition at discharge: Stable  Discharge destination:  Discharged to rehabilitation facility     Medications at discharge:  Discharge Medication List as of 6/17/2022  4:09 PM      START taking these medications    Details   acetaminophen (TYLENOL) 325 MG tablet Take 2 tablets (650 mg) by mouth every 4 hours as needed for other (mild pain),  Disp-100 tablet, R-0, E-Prescribe      aspirin (ASA) 325 MG EC tablet Take 1 tablet (325 mg) by mouth daily, Disp-40 tablet, R-0, E-Prescribe      senna-docusate (SENOKOT-S/PERICOLACE) 8.6-50 MG tablet Take 1-2 tablets by mouth daily as needed for constipation Take while on oral narcotics to prevent or treat constipation., Disp-15 tablet, R-0, E-PrescribeWhile taking narcotics         CONTINUE these medications which have CHANGED    Details   hydrOXYzine (ATARAX) 25 MG tablet Take 1 tablet (25 mg) by mouth every 6 hours as needed for itching or anxiety (with pain, moderate pain), Disp-20 tablet, R-0, Local Print      oxyCODONE (ROXICODONE) 5 MG tablet Take 1-2 tablets (5-10 mg) by mouth every 4 hours as needed for moderate to severe pain, Disp-26 tablet, R-0, No Print Out1 tab for pain <=5, 2 for pain >=6         CONTINUE these medications which have NOT CHANGED    Details   ARIPiprazole (ABILIFY) 5 MG tablet Take 1 tablet (5 mg) by mouth daily, Disp-90 tablet, R-0, E-Prescribe      Diphenhydramine-APAP, sleep,  MG TABS Take by mouth At Bedtime , Historical      hydrochlorothiazide (HYDRODIURIL) 25 MG tablet TAKE 1 TABLET DAILY (EVERY 24 HOURS), Disp-90 tablet, R-1, E-Prescribe      lisinopril (ZESTRIL) 40 MG tablet Take 1 tablet (40 mg) by mouth daily, Disp-30 tablet, R-0, E-Prescribe      potassium chloride ER (KLOR-CON) 20 MEQ CR tablet Take 1 tablet (20 mEq) by mouth daily, Disp-90 tablet, R-0, E-Prescribe      sertraline (ZOLOFT) 100 MG tablet TAKE 3 TABLETS BY MOUTH EVERY DAY, Disp-270 tablet, R-0, E-Prescribe      simvastatin (ZOCOR) 40 MG tablet TAKE 1 TABLET BY MOUTH EVERY DAY IN THE EVENING, Disp-90 tablet, R-1, E-Prescribe      traMADol (ULTRAM) 50 MG tablet Take 50 mg by mouth every 6 hours as needed for severe pain, Historical         STOP taking these medications       acetaminophen (TYLENOL) 650 MG CR tablet Comments:   Reason for Stopping:                          Follow-Up Care:  Patient  should be seen in the office in 14 days by the Orthopedic Surgeon/Physician Assistant.  Call [phone] for appointment or questions.    Dk Christy PA-C

## 2022-06-17 NOTE — PLAN OF CARE
Patient vital signs are at baseline: Yes  Patient able to ambulate as they were prior to admission or with assist devices provided by therapies during their stay:  No,  Reason:  weakness, plan to go to TCU  Patient MUST void prior to discharge:  Yes  Patient able to tolerate oral intake:  Yes  Pain has adequate pain control using Oral analgesics:  Yes  Does patient have an identified :  Yes  Has goal D/C date and time been discussed with patient:  Yes     Plan for pt to discharge to [facility] at 4:30 pm this afternoon. Sister to transport.

## 2022-06-17 NOTE — PROGRESS NOTES
06/17/22 0900   Quick Adds   Quick Adds Certification   Type of Visit Initial PT Evaluation   Living Environment   People in Home alone   Current Living Arrangements house   Home Accessibility stairs within home   Number of Stairs, Within Home, Primary seven   Stair Railings, Within Home, Primary railings safe and in good condition   Transportation Anticipated family or friend will provide   Living Environment Comments split level home   Self-Care   Equipment Currently Used at Home walker, standard   Fall history within last six months no   General Information   Onset of Illness/Injury or Date of Surgery 06/16/22   Referring Physician Dr. Akira Hernandez MD   Patient/Family Therapy Goals Statement (PT) Patient indicates want to go to TCU   Pertinent History of Current Problem (include personal factors and/or comorbidities that impact the POC) 66 y.o. female s/p R SREE performed 6/16   Weight-Bearing Status - RLE weight-bearing as tolerated   Cognition   Affect/Mental Status (Cognition) WFL   Orientation Status (Cognition) oriented x 4   Follows Commands (Cognition) WFL   Pain Assessment   Patient Currently in Pain Yes, see Vital Sign flowsheet   Range of Motion (ROM)   Range of Motion ROM deficits secondary to surgical procedure;ROM deficits secondary to pain   Strength (Manual Muscle Testing)   Strength (Manual Muscle Testing) Able to perform R SLR;Able to perform L SLR;Deficits observed during functional mobility   Bed Mobility   Comment, (Bed Mobility) supine>sit with SBA, sit>supine with South for RLE only   Transfers   Comment, (Transfers) sit>stand from EOB with SBA and 2WW, good stability in standing   Gait/Stairs (Locomotion)   [place] Level (Gait) verbal cues;supervision   Assistive Device (Gait) walker, front-wheeled   Distance in Feet (Required for LE Total Joints) 75   Pattern (Gait) step-through   Deviations/Abnormal Patterns (Gait) antalgic;gait speed decreased   Balance   Balance no deficits were  identified   Clinical Impression   Criteria for Skilled Therapeutic Intervention Yes, treatment indicated   PT Diagnosis (PT) impaired functional mobility s/p R SREE   Influenced by the following impairments pain, weakness, reduced activity tolerance   Functional limitations due to impairments impaired transfers, ambulation, unable to complete stairs   Clinical Presentation (PT Evaluation Complexity) Stable/Uncomplicated   Clinical Presentation Rationale clinical judgment and chart review   Clinical Decision Making (Complexity) low complexity   Planned Therapy Interventions (PT) balance training;bed mobility training;cryotherapy;gait training;home exercise program;patient/family education;stair training;strengthening;transfer training   Anticipated Equipment Needs at Discharge (PT)   (mac equipment)   Risk & Benefits of therapy have been explained evaluation/treatment results reviewed;care plan/treatment goals reviewed;risks/benefits reviewed;current/potential barriers reviewed;participants voiced agreement with care plan;participants included;patient   PT Discharge Planning   PT Discharge Recommendation (DC Rec) Transitional Care Facility   PT Rationale for DC Rec Requires Ax1 for mobility due to pain, unable to safely assess stairs due to pain and lives alone in split level home.   PT Brief overview of current status bed mobility with SBA, transfers wtih 2WW and SBA, amb 75 feet with 2WW   Therapy Certification   Start of care date 06/17/22   Certification date from 06/17/22   Certification date to 06/22/22   Total Evaluation Time   Total Evaluation Time (Minutes) 10   Physical Therapy Goals   PT Frequency Daily   PT Predicted Duration/Target Date for Goal Attainment 06/23/22   PT Goals Bed Mobility;Transfers;Gait;Stairs   PT: Bed Mobility Modified independent   PT: Transfers Modified independent;Assistive device   PT: Gait Supervision/stand-by assist;Standard walker;150 feet   PT: Stairs Supervision/stand-by  assist;7 stairs

## 2022-06-17 NOTE — PROGRESS NOTES
Called [facility] x2 to give report with no answer.     Patient given discharge packet and paper prescriptions to give to the [facility] when she arrives.

## 2022-06-17 NOTE — PLAN OF CARE
POD #1. PRN oxycodone given for right hip pain rated a 7/10. Patient was assisted to get dressed and will be discharging to [facility] today. Patient's sister will be picking her up at approximately [id number].     Reviewed discharge packet with patient.

## 2022-06-17 NOTE — PROGRESS NOTES
[facility] DISCHARGE NOTE    Patient discharged to transitional care unit at 4:43 PM via wheel chair. Accompanied by other:self and staff. Discharge instructions reviewed with patient, opportunity offered to ask questions. Prescriptions sent to patients preferred pharmacy and sent with patient to fill. All belongings sent with patient.    Katelyn Pop RN

## 2022-06-17 NOTE — PROGRESS NOTES
06/17/22 1300   Quick Adds   Quick Adds Certification   Type of Visit Initial Occupational Therapy Evaluation   Living Environment   People in Home alone   Current Living Arrangements house   Home Accessibility stairs within home   Number of Stairs, Within Home, Primary seven   Stair Railings, Within Home, Primary railings safe and in good condition   Transportation Anticipated family or friend will provide   Living Environment Comments split level   Self-Care   Usual Activity Tolerance good   Current Activity Tolerance fair   Equipment Currently Used at Home cane, straight;grab bar, tub/shower;raised toilet seat;shower chair   Activity/Exercise/Self-Care Comment has shower chair available, cane out side   Instrumental Activities of Daily Living (IADL)   Previous Responsibilities laundry;shopping;yardwork;medication management;finances;driving;housekeeping;meal prep   General Information   Onset of Illness/Injury or Date of Surgery 06/16/22   Referring Physician Dr елена Hernandez   Patient/Family Therapy Goal Statement (OT) Return home alone when able   Additional Occupational Profile Info/Pertinent History of Current Problem Pt is a 66 yr old with R THR   Right Lower Extremity (Weight-bearing Status) weight-bearing as tolerated (WBAT)   Cognitive Status Examination   Orientation Status orientation to person, place and time   Cognitive Status Comments no major deficits noted during assessment   Pain Assessment   Patient Currently in Pain Yes, see Vital Sign flowsheet   Range of Motion Comprehensive   General Range of Motion no range of motion deficits identified   Strength Comprehensive (MMT)   General Manual Muscle Testing (MMT) Assessment no strength deficits identified   Bed Mobility   Bed Mobility supine-sit   Supine-Sit [place] (Bed Mobility) minimum assist (75% patient effort)   Transfers   Transfers toilet transfer   Toilet Transfer   Type (Toilet Transfer) stand pivot/stand step   [place] Level  (Toilet Transfer) contact guard;verbal cues   Toilet Transfer Comments v/cs for R LE placement   Clinical Impression   Criteria for Skilled Therapeutic Interventions Met (OT) Yes, treatment indicated   OT Diagnosis weakness   Influenced by the following impairments pain   Assessment of Occupational Performance 3-5 Performance Deficits   Clinical Decision Making Complexity (OT) moderate complexity   Risk & Benefits of therapy have been explained evaluation/treatment results reviewed;care plan/treatment goals reviewed;risks/benefits reviewed;current/potential barriers reviewed;participants voiced agreement with care plan;participants included;patient   Clinical Impression Comments pt planned for D/C this afternoon, no goals needed at this time at acute care facility   OT Discharge Planning   OT Discharge Recommendation (DC Rec) Transitional Care Facility   OT Rationale for DC Rec Pt requiring assist with bed mobiility, LB dressing, all IADKLs lives alone and stairs in home.   OT Brief overview of current status min A LB,   Therapy Certification   Start of Care Date 06/17/22   Certification date from 06/17/22   Certification date to 06/19/22   Medical Diagnosis R THR   Total Evaluation Time (Minutes)   Total Evaluation Time (Minutes) 20

## 2022-06-17 NOTE — PLAN OF CARE
Physical Therapy Discharge Summary    Reason for therapy discharge:    Discharged to transitional care facility.    Progress towards therapy goal(s). See goals on Care Plan in [facility] electronic health record for goal details.  Goals partially met.  Barriers to achieving goals:   discharge from facility.    Therapy recommendation(s):    Continued therapy is recommended.  Rationale/Recommendations:  Recommend PT at TCU to maximize safety and indep with mobility.

## 2022-06-17 NOTE — PROGRESS NOTES
Patient vital signs are at baseline: Yes  Patient able to ambulate as they were prior to admission or with assist devices provided by therapies during their stay:  Yes  Patient MUST void prior to discharge:  Yes  Patient able to tolerate oral intake:  Yes  Pain has adequate pain control using Oral analgesics:  Yes  Does patient have an identified :  Yes  Has goal D/C date and time been discussed with patient:  Yes    AOx4, up w/assist of 1 and walker to bathroom void/dribbling. PRN oxycodone 5mg x1, hydroxyzine x 1 with adequate pain relief. IV SL, VSS, aquacel CDI. Rested comfortably throughout shift.    GALO Zuleta RN

## 2022-06-17 NOTE — PROGRESS NOTES
[facility]      OUTPATIENT PHYSICAL THERAPY EVALUATION  PLAN OF TREATMENT FOR OUTPATIENT REHABILITATION  (COMPLETE FOR INITIAL CLAIMS ONLY)  Patient's Last Name, First Name, M.I.  YOB: 1955  Dee Dee Shepherd                        Provider's Name  [facility] Medical Record No.  [id number]                               Onset Date:  06/16/22   Start of Care Date:  06/17/22      Type:     _X_PT   ___OT   ___SLP Medical Diagnosis:                           PT Diagnosis:  impaired functional mobility s/p R SREE   Visits from SOC:  1   _________________________________________________________________________________  Plan of Treatment/Functional Goals    Planned Interventions: balance training, bed mobility training, cryotherapy, gait training, home exercise program, patient/family education, stair training, strengthening, transfer training     Goals: See Physical Therapy Goals on Care Plan in [employer] electronic health record.    Therapy Frequency: Daily  Predicted Duration of Therapy Intervention: 06/23/22  _________________________________________________________________________________    I CERTIFY THE NEED FOR THESE SERVICES FURNISHED UNDER        THIS PLAN OF TREATMENT AND WHILE UNDER MY CARE     (Physician co-signature of this document indicates review and certification of the therapy plan).                Certification date from: 06/17/22, Certification date to: 06/22/22    Referring Physician: Dr. Akira Hernandez MD            Initial Assessment        See Physical Therapy evaluation dated 06/17/22 in Epic electronic health record.

## 2022-06-17 NOTE — CONSULTS
Care Management Initial Consult    General Information  Assessment completed with: Patient,    Type of CM/SW Visit: Initial Assessment    Primary Care Provider verified and updated as needed:     Readmission within the last 30 days:        Reason for Consult: discharge planning  Advance Care Planning: Advance Care Planning Reviewed: present on chart        Communication Assessment  Patient's communication style: spoken language (English or Bilingual)    Hearing Difficulty or Deaf: no   Wear Glasses or Blind: yes    Cognitive  Cognitive/Neuro/Behavioral: WDL                      Living Environment:   People in home: alone     Current living Arrangements: house      Able to return to prior arrangements: yes     Family/Social Support:  Care provided by: self  Provides care for: no one  Marital Status:   Sibling(s)          Description of Support System: Supportive, Involved    Support Assessment: Adequate family and caregiver support, Adequate social supports    Current Resources:   Patient receiving home care services: No     Community Resources: None  Equipment currently used at home: walker, standard  Supplies currently used at home:      Employment/Financial:  Employment Status:  Employed     Financial Concerns: No concerns identified      Lifestyle & Psychosocial Needs:  Social Determinants of Health     Tobacco Use: High Risk     Smoking Tobacco Use: Current Every Day Smoker     Smokeless Tobacco Use: Never Used   Alcohol Use: Not on file   Financial Resource Strain: Not on file   Food Insecurity: Not on file   Transportation Needs: Not on file   Physical Activity: Not on file   Stress: Not on file   Social Connections: Not on file   Intimate Partner Violence: Not on file   Depression: At risk     PHQ-2 Score: 5   Housing Stability: Not on file     Functional Status:  Prior to admission patient needed assistance:   Dependent ADLs:: Independent  Dependent IADLs:: Independent     Mental Health  Status:  Mental Health Status: No Current Concerns       Chemical Dependency Status:  Chemical Dependency Status: No Current Concerns           Values/Beliefs:  Spiritual, Cultural Beliefs, Amish Practices, Values that affect care: no             Additional Information:    CM received referral to assist with discharge planning. Writer met with patient at bedside and introduced self and role. Patient lives alone independently in a split level home in the community. She has a sister that is her neighbor and is involved and supportive. Patient still drives.     Patient is hoping to discharge to TCU for strengthening. Therapy recommendations include TCU.     Patient [facility], writer informed her that [facility] does not have any available beds until next week. Patient would like referrals sent to facilities in the [facility].     TCU referrals pending:    [facility] (Main Phone: [phone] Admissions phone: [phone] Fax: [phone])    [facility] (Admissions Phone: [phone] Main Phone: [phone] Fax: [phone])    [facility] (Admissions Phone: [phone] Main Phone: [phone] Fax: [phone])- Accepted for today 6/17    [facility] (Phone: [phone] Fax: [phone])    PLAN: TCU- Sister to transport @ 4:30PM    [id number]    EDENILSON FOSTER RN

## 2022-06-17 NOTE — PROGRESS NOTES
"[facility] Progress Note      Post-operative Day: 1 Day Post-Op    Procedure(s):  Total hip arthroplasty. Right  Subjective:    Pt reports mild pain in the hip and otherwise she feels well. She would like to go to the [facility] TCU after discharge.    Chest pain, SOB:  No  Nausea, vomiting:  No  Lightheadedness, dizziness:  No  Neuro:  Patient denies new onset numbness or paresthesias      Objective:  Blood pressure 133/54, pulse 80, temperature 98.5  F (36.9  C), temperature source Oral, resp. rate 18, height 1.575 m (5' 2\"), weight 86.2 kg (190 lb), SpO2 98 %, not currently breastfeeding.    Patient Vitals for the past 24 hrs:   BP Temp Temp src Pulse Resp SpO2 Height Weight   06/17/22 0716 133/54 98.5  F (36.9  C) Oral 80 18 98 % -- --   06/17/22 0331 114/67 98.3  F (36.8  C) Oral 72 18 97 % -- --   06/16/22 2256 123/70 98.1  F (36.7  C) Oral 78 20 98 % -- --   06/16/22 2020 -- 97.9  F (36.6  C) Oral -- -- -- -- --   06/16/22 1900 (!) 144/67 -- -- 78 16 99 % -- --   06/16/22 1800 (!) 146/53 -- -- 77 16 100 % -- --   06/16/22 1700 (!) 160/72 -- -- 75 16 99 % -- --   06/16/22 1646 (!) 165/74 -- -- 76 16 99 % -- --   06/16/22 1616 (!) 150/76 -- -- 74 16 99 % -- --   06/16/22 1601 (!) 145/71 97.7  F (36.5  C) Oral 70 16 100 % -- --   06/16/22 1536 -- -- -- -- -- 100 % -- --   06/16/22 1515 (!) 148/75 -- -- 67 14 100 % -- --   06/16/22 1500 (!) 152/77 98.1  F (36.7  C) Oral 68 16 99 % -- --   06/16/22 1445 (!) 148/68 -- -- 68 16 100 % -- --   06/16/22 1435 124/60 98.2  F (36.8  C) Oral 73 11 100 % -- --   06/16/22 1107 (!) 143/70 99  F (37.2  C) Oral 88 18 98 % 1.575 m (5' 2\") 86.2 kg (190 lb)       Wt Readings from Last 4 Encounters:   06/16/22 86.2 kg (190 lb)   06/06/22 86.4 kg (190 lb 6.4 oz)   05/31/22 87.4 kg (192 lb 9.6 oz)   02/04/22 85.3 kg (188 lb)       Gen: A&O x 3. NAD. Appears comfortable, sitting up in bed.   Wound status: Covered, Aquacel in place.  Circulation, motion and sensation: " Dorsiflexion/plantarflexion intact and equal bilaterally; distal lower extremity sensation is intact and equal bilaterally. Foot and toes are warm and well perfused.    Swelling: Mild  Calf tenderness: calves are soft and non-tender bilaterally     Pertinent Labs   Lab Results: personally reviewed.     Recent Labs   Lab Test 06/17/22  0449 06/06/22  1422 05/31/22  1433 11/16/21  1559 09/17/20  0759 03/26/20  1317   HGB 11.5*  --  14.0  --   --  15.1   HCT  --   --  41.1  --   --  44.3   MCV  --   --  98  --   --  98   PLT  --   --  226  --   --  324   NA  --  138 139 138   < > 139    < > = values in this interval not displayed.       Plan:   Continue current cares and rehabilitation.  Anticoagulation protocol: ASA 325mg daily x 30  days  Pain medications:  oxycodone and tylenol  Weight bearing status:  WBAT  Disposition:  Plan for discharge to TCU when medically stable and pain is controlled, once bed is available.               Report completed by:  Dk Christy PA-C  Date: 6/17/2022  Time: 8:48 AM

## 2022-06-24 DIAGNOSIS — I10 ESSENTIAL HYPERTENSION WITH GOAL BLOOD PRESSURE LESS THAN 140/90: ICD-10-CM

## 2022-06-24 RX ORDER — LISINOPRIL 40 MG/1
TABLET ORAL
Qty: 30 TABLET | Refills: 0 | Status: SHIPPED | OUTPATIENT
Start: 2022-06-24 | End: 2022-07-28

## 2022-06-29 ENCOUNTER — TELEPHONE (OUTPATIENT)
Dept: FAMILY MEDICINE | Facility: CLINIC | Age: 67
End: 2022-06-29

## 2022-06-29 NOTE — TELEPHONE ENCOUNTER
Prior Authorization Retail Medication Request    Medication/Dose: Sertraline  ICD code (if different than what is on RX):  Severe episode of recurrent major depressive disorder, without psychotic features (H) [F33.2]  Previously Tried and Failed:    Rationale:  Qty Limit Override    Insurance Name:  [employer]  Insurance ID:  [id number]      Pharmacy Information (if different than what is on RX)  Name:  J[facility]  Phone:

## 2022-06-30 ENCOUNTER — TRANSFERRED RECORDS (OUTPATIENT)
Dept: FAMILY MEDICINE | Facility: CLINIC | Age: 67
End: 2022-06-30

## 2022-06-30 NOTE — TELEPHONE ENCOUNTER
Prior Authorization Approval    Authorization Effective Date: 4/1/2022  Authorization Expiration Date: 6/30/2023  Medication: Sertraline  Approved Dose/Quantity:    Reference #:     Insurance Company: [employer] [place] - Phone [phone] Fax [phone]  Expected CoPay:       CoPay Card Available:      Foundation Assistance Needed:    Which Pharmacy is filling the prescription (Not needed for infusion/clinic administered): [facility] [address] [place] [address]  Pharmacy Notified: Yes  Patient Notified: Yes  **Instructed pharmacy to notify patient when script is ready to /ship.**

## 2022-06-30 NOTE — TELEPHONE ENCOUNTER
Central Prior Authorization Team   Phone: [phone]    PA Initiation    Medication: Sertraline  Insurance Company: [employer] [place] - Phone [phone] Fax [phone]  Pharmacy Filling the Rx: [facility] [id number] [place] [facility] [place] [place] - [address]  Filling Pharmacy Phone: [phone]  Filling Pharmacy Fax:    Start Date: 6/30/2022

## 2022-07-05 ENCOUNTER — TELEPHONE (OUTPATIENT)
Dept: FAMILY MEDICINE | Facility: CLINIC | Age: 67
End: 2022-07-05

## 2022-07-05 NOTE — TELEPHONE ENCOUNTER
Reason for Call:  Other Follow patient in home care?    Detailed comments: Is Dr. Wu willing to follow patient for home care services?    Phone Number Patient can be reached at: Other phone number:  Indira [facility], [facility] [phone]    Best Time: Any    Can we leave a detailed message on this number? YES    Call taken on 7/5/2022 at 11:21 AM by Janice Schofield

## 2022-07-20 ENCOUNTER — TELEPHONE (OUTPATIENT)
Dept: BEHAVIORAL HEALTH | Facility: CLINIC | Age: 67
End: 2022-07-20

## 2022-07-20 NOTE — TELEPHONE ENCOUNTER
First attempt. Writer left message re: scheduling initial TC therapy appt. Postponed to tomorrow 7.21. Sent [employer] message.      [facility]  Care Coordinator  7.20  ---- Message from Malcolm Conway sent at 7/20/2022 10:26 AM CDT -----  Transition Clinic Referral   [place] Only   Limited [place] Availability    Type of Referral:    _X___Therapy Only: Does your patient require a same or next day appointment?:   ____Medication Only: Referral will automatically be declined if no next level of care scheduled. Suboxone and Opioid management referrals are automatically denied.  ____Therapy & Medication:  Referral will automatically be declined if no next level of care scheduled. Suboxone and Opioid management referrals are automatically denied.  ____Diagnostic Assessment     Suboxone and Opioid Management Referrals are Automatically Denied    TC Psychiatry cannot see patients who do not have active medical insurance    Referring Provider Name: Malcolm Conway    Clinician completing the assessment. Dalia Wu    Referring Provider: [facility] PROVIDER  Dalia Wu  If known, referring provider contact name: ; Phone Number:   Service Line/Location:     Reason for Transition Clinic Referral: F33.2 (ICD-10-CM) - Severe episode of recurrent major depressive disorder, without psychotic features     Next Level of Care Patient Will Be Transitioned To: ind Therapy   Provider(s)  Location  10/28/2022 Status: Scheduled  Time: 11:00 AM Length: 60  Visit Type: ADULT PSYCHOTHERAPY NEW [[id number]] Copay: $0.00  Provider: Yocasta Mehta LICSW Department: [facility]EDRO  Encounter #: [id number]        TC Psychiatry cannot see patients who do not have active medical insurance    What Would Be Helpful from the Transition Clinic: [place]      Needs: NO    Does Patient Have Access to Technology: yes    Patient E-mail Address: [email or web address]    Current Patient Phone Number: [phone];      Clinician Gender Preference (if applicable): kellee Conway

## 2022-07-21 ENCOUNTER — TELEPHONE (OUTPATIENT)
Dept: BEHAVIORAL HEALTH | Facility: CLINIC | Age: 67
End: 2022-07-21

## 2022-07-21 NOTE — TELEPHONE ENCOUNTER
Second attempt at reaching patient. Left message asking for a return call to schedule with the TC.    Ludy Dena  Transition Clinic Coordinator  Date and Time: 07/21/22 8:00 AM    ----- Message from Malcolm Conway sent at 7/20/2022 10:26 AM CDT -----  Transition Clinic Referral   [place] Only   Limited [place] Availability    Type of Referral:    _X___Therapy Only: Does your patient require a same or next day appointment?:   ____Medication Only: Referral will automatically be declined if no next level of care scheduled. Suboxone and Opioid management referrals are automatically denied.  ____Therapy & Medication:  Referral will automatically be declined if no next level of care scheduled. Suboxone and Opioid management referrals are automatically denied.  ____Diagnostic Assessment     Suboxone and Opioid Management Referrals are Automatically Denied    TC Psychiatry cannot see patients who do not have active medical insurance    Referring Provider Name: Malcolm Conway    Clinician completing the assessment. Dalia Wu    Referring Provider: [facility] PROVIDER  Dalia Wu  If known, referring provider contact name: ; Phone Number:   Service Line/Location:     Reason for Transition Clinic Referral: F33.2 (ICD-10-CM) - Severe episode of recurrent major depressive disorder, without psychotic features     Next Level of Care Patient Will Be Transitioned To: ind Therapy   Provider(s)  Location  10/28/2022 Status: Scheduled  Time: 11:00 AM Length: 60  Visit Type: ADULT PSYCHOTHERAPY NEW [[id number]] Copay: $0.00  Provider: Yocasta Mehta LICSW Department: [facility]  Encounter #: [id number]        TC Psychiatry cannot see patients who do not have active medical insurance    What Would Be Helpful from the Transition Clinic: [place]      Needs: NO    Does Patient Have Access to Technology: yes    Patient E-mail Address: [email or web address]    Current Patient Phone Number:  [phone];     Clinician Gender Preference (if applicable): guidok    Malcolm Conway

## 2022-07-27 ENCOUNTER — MEDICAL CORRESPONDENCE (OUTPATIENT)
Dept: FAMILY MEDICINE | Facility: CLINIC | Age: 67
End: 2022-07-27

## 2022-07-28 DIAGNOSIS — I10 ESSENTIAL HYPERTENSION WITH GOAL BLOOD PRESSURE LESS THAN 140/90: ICD-10-CM

## 2022-07-28 RX ORDER — LISINOPRIL 40 MG/1
TABLET ORAL
Qty: 30 TABLET | Refills: 0 | Status: SHIPPED | OUTPATIENT
Start: 2022-07-28 | End: 2022-08-01

## 2022-07-28 NOTE — TELEPHONE ENCOUNTER
Eating Heart-Healthy Foods  Eating has a big impact on your heart health. In fact, eating healthier can improve several of your heart risks at once. For instance, it helps you manage weight, cholesterol, and blood pressure. Here are ideas to help you make heart-healthy changes without giving up all the foods and flavors you love.  Getting started  · Talk with your health care provider about eating plans, such as the DASH or Mediterranean diet. You may also be referred to a dietitian.  · Change a few things at a time. Give yourself time to get used to a few eating changes before adding more.  · Work to create a tasty, healthy eating plan that you can stick to for the rest of your life.    Goals for healthy eating  Below are some tips to improve your eating habits:  · Limit saturated fats and trans fats. Saturated fats raise your levels of cholesterol, so keep these fats to a minimum. They are found in foods such as fatty meats, whole milk, cheese, and palm and coconut oils. Avoid trans fats because they lower good cholesterol as well as raise bad cholesterol. Trans fats are most often found in processed foods.  · Reduce sodium (salt) intake. Eating too much salt may increase your blood pressure. Limit your sodium intake to 2,300 milligrams (mg) per day, or less if your health care provider recommends it. Dining out less often and eating fewer processed foods are two great ways to decrease the amount of salt you consume.  · Managing calories. A calorie is a unit of energy. Your body burns calories for fuel, but if you eat more calories than your body burns, the extras are stored as fat. Your health care provider can help you create a diet plan to manage your calories. This will likely include eating healthier foods as well as exercising regularly. To help you track your progress, keep a diary to record what you eat and how often you exercise.  Choose the right foods  Aim to make these foods staples of your diet. If  Routing refill request to provider for review/approval because:  PCP to determine first refill after dose adjustment    Jona Bernard RN         you have diabetes, you may have different recommendations than what is listed here:  · Fruits and vegetable provide plenty of nutrients without a lot of calories. At meals, fill half your plate with these foods. Split the other half of your plate between whole grains and lean protein.  · Whole grains are high in fiber and rich in vitamins and nutrients. Good choices include whole-wheat bread, pasta, and brown rice.  · Lean proteins give you nutrition with less fat. Good choices include fish, skinless chicken, and beans.  · Low-fat or nonfat dairy provides nutrients without a lot of fat. Try low-fat or nonfat milk, cheese, or yogurt.  · Healthy fats can be good for you in small amounts. These are unsaturated fats, such as olive oil, nuts, and fish. Try to have at least 2 servings per week of fatty fish such as salmon, sardines, mackerel, rainbow trout, and albacore tuna. These contain omega-3 fatty acids, which are good for your heart. Flaxseed is another source of a heart-healthy fat.  More on heart healthy eating    Read food labels  Healthy eating starts at the grocery store. Be sure to pay attention to food labels on packaged foods. Look for products that are high in fiber and protein, and low in saturated fat, cholesterol, and sodium. Avoid products that contain trans fat. And pay close attention to serving size. For instance, if you plan to eat two servings, double all the numbers on the label.  Prepare food right  A key part of healthy cooking is cutting down on added fat and salt. Look on the internet for lower-fat, lower-sodium recipes. Also, try these tips:  · Remove fat from meat and skin from poultry before cooking.  · Skim fat from the surface of soups and sauces.  · Broil, boil, bake, steam, grill, and microwave food without added fats.  · Choose ingredients that spice up your food without adding calories, fat, or sodium. Try these items: horseradish, hot sauce, lemon, mustard, nonfat salad dressings,  and vinegar. For salt-free herbs and spices, try basil, cilantro, cinnamon, pepper, and rosemary.  Date Last Reviewed: 6/25/2015  © 6928-8899 [employer]. [address] [place] [address], [place] [address]. All rights reserved. This information is not intended as a substitute for professional medical care. Always follow your healthcare professional's instructions.        Diverticulitis    Some people get pouches along the wall of the colon as they get older. The pouches, called diverticuli, usually cause no symptoms. If the pouches become blocked, you can get an infection. This infection is called diverticulitis. It causes pain in your lower abdomen and fever. If not treated, it can become a serious condition, causing an abscess to form inside the pouch. The abscess may block the intestinal tract even or rupture, spreading infection throughout the abdomen.  When treatment is started early, oral antibiotics alone may be enough to cure diverticulitis. This method is tried first. But, if you don't improve or if your condition gets worse while using oral antibiotics, you may need to be admitted to the hospital for IV antibiotics. Severe cases may require surgery.  Home care  The following guidelines will help you care for yourself at home:  · During the acute illness, rest and follow your healthcare provider's instructions about diet. Sometimes you will need to follow a clear liquid diet to rest your bowel. Once your symptoms are better, you may be told to follow a low-fiber diet for some time. Include foods like:  ¨ Flake cereal, mashed potatoes, pancakes, waffles, pasta, white bread, rice, applesauce, bananas, eggs, fish, poultry, tofu, and cooked soft vegetables  · Take antibiotics exactly as instructed. Don't miss any doses or stop taking the medication, even if you feel better.  · Monitor your temperature and tell your healthcare provider if you have rising temperatures.  Preventing future attacks  Once you  have an episode of diverticulitis, you are at risk for having it again. After you have recovered from this episode, you may be able to lower your risk by eating a high-fiber diet (20 gm/day to 35 gm/day of fiber). This cleans out the colon pouches that already exist and may prevent new ones from forming. Foods high in fiber include fresh fruits and edible peelings, raw or lightly cooked vegetables, whole grain cereals and breads, dried beans and peas, and bran.  Other steps that can help prevent future attacks include:  · Take your medicines, such as antibiotics, as your healthcare provider says.  · Drink 6 to 8 glasses of water every day, unless told otherwise.  · Use a heating pad or hot water bottle to help abdominal cramping or pain.  · Begin an exercise program. Ask your healthcare provider how to get started. You can benefit from simple activities such as walking or gardening.  · Treat diarrhea with a bland diet. Start with liquids only; then slowly add fiber over time.  · Watch for changes in your bowel movements (constipation to diarrhea). Avoid constipation by eating a high fiber diet and taking a stool softener if needed.  · Get plenty of rest and sleep.  Follow-up care  Follow up with your healthcare provider as advised or sooner if you are not getting better in the next 2 days.  When to seek medical advice  Call your healthcare provider right away if any of these occur:  · Fever of 100.4°F (38°C) or higher, or as directed by your healthcare provider  · Repeated vomiting or swelling of the abdomen  · Weakness, dizziness, light-headedness  · Pain in your abdomen that gets worse, severe, or spreads to your back  · Pain that moves to the right lower abdomen  · Rectal bleeding (stools that are red, black or maroon color)  · Unexpected vaginal bleeding  Date Last Reviewed: 9/1/2016  © 5985-2098 [employer]. [address] [place] [address], [place] [address]. All rights reserved. This information is not  intended as a substitute for professional medical care. Always follow your healthcare professional's instructions.

## 2022-08-01 ENCOUNTER — OFFICE VISIT (OUTPATIENT)
Dept: FAMILY MEDICINE | Facility: CLINIC | Age: 67
End: 2022-08-01
Payer: COMMERCIAL

## 2022-08-01 VITALS
TEMPERATURE: 97 F | WEIGHT: 187 LBS | HEART RATE: 64 BPM | BODY MASS INDEX: 34.2 KG/M2 | DIASTOLIC BLOOD PRESSURE: 80 MMHG | SYSTOLIC BLOOD PRESSURE: 136 MMHG | OXYGEN SATURATION: 99 %

## 2022-08-01 DIAGNOSIS — E87.6 HYPOKALEMIA: ICD-10-CM

## 2022-08-01 DIAGNOSIS — F33.2 SEVERE EPISODE OF RECURRENT MAJOR DEPRESSIVE DISORDER, WITHOUT PSYCHOTIC FEATURES (H): ICD-10-CM

## 2022-08-01 DIAGNOSIS — I10 ESSENTIAL HYPERTENSION WITH GOAL BLOOD PRESSURE LESS THAN 140/90: ICD-10-CM

## 2022-08-01 PROCEDURE — 99214 OFFICE O/P EST MOD 30 MIN: CPT | Performed by: FAMILY MEDICINE

## 2022-08-01 RX ORDER — POTASSIUM CHLORIDE 1500 MG/1
20 TABLET, EXTENDED RELEASE ORAL DAILY
Qty: 90 TABLET | Refills: 1 | Status: SHIPPED | OUTPATIENT
Start: 2022-08-01 | End: 2023-07-12

## 2022-08-01 RX ORDER — ARIPIPRAZOLE 5 MG/1
5 TABLET ORAL DAILY
Qty: 90 TABLET | Refills: 0 | Status: SHIPPED | OUTPATIENT
Start: 2022-08-01 | End: 2022-11-04

## 2022-08-01 RX ORDER — LISINOPRIL 40 MG/1
40 TABLET ORAL DAILY
Qty: 90 TABLET | Refills: 1 | Status: SHIPPED | OUTPATIENT
Start: 2022-08-01 | End: 2022-11-18

## 2022-08-01 ASSESSMENT — PATIENT HEALTH QUESTIONNAIRE - PHQ9
SUM OF ALL RESPONSES TO PHQ QUESTIONS 1-9: 16
SUM OF ALL RESPONSES TO PHQ QUESTIONS 1-9: 16
10. IF YOU CHECKED OFF ANY PROBLEMS, HOW DIFFICULT HAVE THESE PROBLEMS MADE IT FOR YOU TO DO YOUR WORK, TAKE CARE OF THINGS AT HOME, OR GET ALONG WITH OTHER PEOPLE: SOMEWHAT DIFFICULT

## 2022-08-01 NOTE — PROGRESS NOTES
"  A/p:      ICD-10-CM    1. Severe episode of recurrent major depressive disorder, without psychotic features (H)  F33.2 ARIPiprazole (ABILIFY) 5 MG tablet   2. Essential hypertension with goal blood pressure less than 140/90  I10 lisinopril (ZESTRIL) 40 MG tablet   3. Hypokalemia  E87.6 potassium chloride ER (KLOR-CON) 20 MEQ CR tablet   Depression:  Remains severely depressed.  Pt feels symptoms have been stable.  Denies active suicidal ideation.  States she feels safe.  Has collaborative care psych appointment upcoming.  Will continue current meds for now.  Crisis number reviewed if needed.      HTN:  Remains well controlled on current meds      Patient Instructions   The new mental health crisis line number is [phone].  Please be sure to reach out if you need to.    Glad you have the psychiatry appointment coming up as well.    Let's continue all your medicines as is and plan a visit again in 6 months or sooner if needed        Dami Funez is a 66 year old, presenting for the following health issues:  Hospital F/U      HPI   {    Hospital Follow-up Visit:    Hospital/Nursing Home/IP Rehab Facility: [facility]  Date of Admission: 06/16/22  Date of Discharge: 06/17/22  Reason(s) for Admission: right hip replacement, appt with ortho tomorrow    Was your hospitalization related to COVID-19? No   Problems taking medications regularly:  None  Medication changes since discharge: None  Problems adhering to non-medication therapy:  None    Summary of hospitalization:  [facility] discharge summary reviewed  Diagnostic Tests/Treatments reviewed.  Follow up needed: none  Other Healthcare Providers Involved in Patient s Care:         None  Update since discharge: improved.       Post Medication Reconciliation Status:        Plan of care communicated with patient       Mood has not been that great.  Things went well with surgery and PT but just does not feel \"good or right\".   " Has psychiatry appointment scheduled but was cancelled last minute.  New appointment is scheduled in the near future.    Feels like she is alone too much but has no interest in going and doing things.  Feels sad that her family does not reach out to her more to engage but she does not reach out to them either.  None of these are new patterns, she is well aware that these have been long standing concerns for her.    She denies any active suicidal ideation of plan for self harm but has continued to have passive thoughts.  These are unchanged.  Feels she is safe from herself.  States she would reach out to the crisis line or to her sister if things changed or worsened.       Notes BP has been well controlled sicne her pre-op med adjustment.  Doing well on medications with no concerns.    Review of Systems   Constitutional, HEENT, cardiovascular, pulmonary, gi and gu systems are negative, except as otherwise noted.      Objective    /80   Pulse 64   Temp 97  F (36.1  C) (Tympanic)   Wt 84.8 kg (187 lb)   SpO2 99%   Breastfeeding No   BMI 34.20 kg/m    Body mass index is 34.2 kg/m .  Physical Exam   PE:  VS as above   Gen:  WN/WD/WH female in NAD   Psych: Alert and oriented times 3; coherent speech, normal   rate and volume, able to articulate logical thoughts, able   to abstract reason, no tangential thoughts, no hallucinations   or delusions  Her affect is depressed      Epic reviewed            .  ..  Answers for HPI/ROS submitted by the patient on 8/1/2022  If you checked off any problems, how difficult have these problems made it for you to do your work, take care of things at home, or get along with other people?: Somewhat difficult  PHQ9 TOTAL SCORE: 16

## 2022-08-01 NOTE — PATIENT INSTRUCTIONS
The new mental health crisis line number is [phone].  Please be sure to reach out if you need to.    Glad you have the psychiatry appointment coming up as well.    Let's continue all your medicines as is and plan a visit again in 6 months or sooner if needed

## 2022-08-01 NOTE — NURSING NOTE
"Initial /80   Pulse 64   Temp 97  F (36.1  C) (Tympanic)   Wt 84.8 kg (187 lb)   SpO2 99%   Breastfeeding No   BMI 34.20 kg/m   Estimated body mass index is 34.2 kg/m  as calculated from the following:    Height as of 6/16/22: 1.575 m (5' 2\").    Weight as of this encounter: 84.8 kg (187 lb). .      "

## 2022-08-02 ENCOUNTER — TRANSFERRED RECORDS (OUTPATIENT)
Dept: FAMILY MEDICINE | Facility: CLINIC | Age: 67
End: 2022-08-02

## 2022-08-11 NOTE — TELEPHONE ENCOUNTER
Please see previous notes regarding home care. Forms in [facility] office, please advise.    Thank you,    Hawa Frye, KVNG Lakhani

## 2022-08-20 ENCOUNTER — TRANSFERRED RECORDS (OUTPATIENT)
Dept: FAMILY MEDICINE | Facility: CLINIC | Age: 67
End: 2022-08-20

## 2022-08-22 RX ORDER — FLUOXETINE 40 MG/1
CAPSULE ORAL
Qty: 1 CAPSULE | Refills: 0 | OUTPATIENT
Start: 2022-08-22

## 2022-08-22 RX ORDER — ARIPIPRAZOLE 5 MG/1
TABLET ORAL
Qty: 90 TABLET | Refills: 0 | OUTPATIENT
Start: 2022-08-22

## 2022-08-23 ENCOUNTER — HOSPITAL ENCOUNTER (EMERGENCY)
Facility: CLINIC | Age: 67
Discharge: HOME | End: 2022-08-23
Attending: FAMILY MEDICINE | Admitting: FAMILY MEDICINE
Payer: COMMERCIAL

## 2022-08-23 ENCOUNTER — APPOINTMENT (OUTPATIENT)
Dept: ULTRASOUND IMAGING | Facility: CLINIC | Age: 67
End: 2022-08-23
Attending: FAMILY MEDICINE
Payer: COMMERCIAL

## 2022-08-23 VITALS
OXYGEN SATURATION: 97 % | SYSTOLIC BLOOD PRESSURE: 160 MMHG | RESPIRATION RATE: 18 BRPM | HEIGHT: 63 IN | BODY MASS INDEX: 32.25 KG/M2 | WEIGHT: 182 LBS | TEMPERATURE: 97 F | HEART RATE: 82 BPM | DIASTOLIC BLOOD PRESSURE: 76 MMHG

## 2022-08-23 DIAGNOSIS — M79.662 PAIN OF LEFT LOWER LEG: ICD-10-CM

## 2022-08-23 PROCEDURE — 99284 EMERGENCY DEPT VISIT MOD MDM: CPT | Performed by: FAMILY MEDICINE

## 2022-08-23 PROCEDURE — 99284 EMERGENCY DEPT VISIT MOD MDM: CPT | Mod: 25 | Performed by: FAMILY MEDICINE

## 2022-08-23 PROCEDURE — 93971 EXTREMITY STUDY: CPT | Mod: LT

## 2022-08-23 ASSESSMENT — ACTIVITIES OF DAILY LIVING (ADL): ADLS_ACUITY_SCORE: 35

## 2022-08-23 NOTE — ED TRIAGE NOTES
Left leg pain since Thursday night, concerned about blood clot     Triage Assessment     Row Name 08/23/22 1128       Triage Assessment (Adult)    Airway WDL WDL       Cardiac WDL    Cardiac WDL WDL       Cognitive/Neuro/Behavioral WDL    Cognitive/Neuro/Behavioral WDL WDL

## 2022-08-23 NOTE — ED PROVIDER NOTES
History     Chief Complaint   Patient presents with     Leg Pain     Left leg pain since Thursday night, concerned about blood clot     HPI  Dee Dee Shepherd is a 66 year old female, past medical history is significant for morbid obesity, breast cancer, depression, OA, hypertension, hyperlipidemia, presents to the emergency department concerns of possible blood clot in her left leg.  History is obtained from the patient who identifies concerns of discomfort throughout the left lower extremity, painful and possible swelling in the left calf beginning last week Thursday or 5 days prior to presentation.  She reports that she was seen in an orthopedic urgent care center over the weekend and x-rays were obtained which she was told were unremarkable but that she should have either an ultrasound or an MRI for further evaluation.  They were not able to do that there.  After discussion with her sister she felt it would be appropriate to come to the emergency department.  She notes fatigue however does not notice any chest pain shortness of breath.  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  She smokes about one third a pack of cigarettes per day.  She is not on hormonal replacement therapy.      Allergies:  No Known Allergies    Problem List:    Patient Active Problem List    Diagnosis Date Noted     Status post total replacement of hip 06/16/2022     Priority: Medium     Morbid obesity (H) 05/31/2022     Priority: Medium     Personal history of malignant neoplasm of breast 05/17/2018     Priority: Medium       9/09 right lumpectomy and ALND: 0.9 cm grade 2 invasive carcinoma with   2/14 nodes positive, ER/PA negative, HER2 negative    1/10 completed 4 cycles of dose dense AC followed by 4 cycles of dose   dense Taxol    3/10  completed radiation       Severe episode of recurrent major depressive disorder, without psychotic features (H) 05/17/2018     Priority: Medium     Primary osteoarthritis of right hip 05/17/2018     Priority: Medium     Essential hypertension with goal blood pressure less than 140/90 05/14/2018     Priority: Medium     Hyperlipidemia LDL goal <130 05/14/2018     Priority: Medium     Abnormal glucose 05/14/2018     Priority: Medium        Past Medical History:    Past Medical History:   Diagnosis Date     Anxiety      Chronic low back pain      Depressive disorder      Hyperlipidemia      Hypertension      Malignant neoplasm of breast (female) (H)      Pap smear abnormality of cervix with cytologic evidence of malignancy      Tobacco abuse        Past Surgical History:    Past Surgical History:   Procedure Laterality Date     ARTHROPLASTY HIP Right 6/16/2022    Procedure: Total hip arthroplasty. Right;  Surgeon: Akira Hernandez MD;  Location: [facility]     COLONOSCOPY N/A 5/3/2021    Procedure: Colonoscopy, With Polypectomy And Biopsy;  Surgeon: Ney Torrez MD;  Location: [facility]     MASTECTOMY       ZZC LIGATE FALLOPIAN TUBE         Family History:    Family History   Problem Relation Age of Onset     Hypertension Father      Diabetes Brother        Social History:  Marital Status:   [4]  Social History     Tobacco Use     Smoking status: Current Every Day Smoker     Packs/day: 0.50     Years: 35.00     Pack years: 17.50     Types: Cigarettes     Smokeless tobacco: Never Used   Substance Use Topics     Alcohol use: Yes     Comment: weekends drinking more 3-5 drinks     Drug use: No        Medications:    acetaminophen (TYLENOL) 325 MG tablet  ARIPiprazole (ABILIFY) 5 MG tablet  aspirin (ASA) 325 MG EC tablet  Diphenhydramine-APAP, sleep,  MG TABS  hydrochlorothiazide (HYDRODIURIL) 25 MG tablet  hydrOXYzine (ATARAX) 25 MG tablet  lisinopril (ZESTRIL) 40 MG tablet  oxyCODONE (ROXICODONE) 5 MG  "tablet  potassium chloride ER (KLOR-CON) 20 MEQ CR tablet  senna-docusate (SENOKOT-S/PERICOLACE) 8.6-50 MG tablet  sertraline (ZOLOFT) 100 MG tablet  simvastatin (ZOCOR) 40 MG tablet  traMADol (ULTRAM) 50 MG tablet          Review of Systems   All other systems reviewed and are negative.      Physical Exam   BP: (!) 158/73  Pulse: 80  Temp: 96.8  F (36  C)  Resp: 16  Height: 160 cm (5' 3\")  Weight: 82.6 kg (182 lb)  SpO2: 97 %      Physical Exam  Vitals and nursing note reviewed.   Constitutional:       General: She is not in acute distress.     Appearance: Normal appearance. She is normal weight. She is not ill-appearing.   HENT:      Head: Normocephalic and atraumatic.      Right Ear: Tympanic membrane normal.      Left Ear: Tympanic membrane normal.      Nose: Nose normal.      Mouth/Throat:      Mouth: Mucous membranes are dry.      Pharynx: Oropharynx is clear.   Eyes:      Extraocular Movements: Extraocular movements intact.      Conjunctiva/sclera: Conjunctivae normal.      Pupils: Pupils are equal, round, and reactive to light.   Cardiovascular:      Rate and Rhythm: Normal rate and regular rhythm.      Pulses: Normal pulses.      Heart sounds: Normal heart sounds.   Pulmonary:      Effort: Pulmonary effort is normal.      Breath sounds: Normal breath sounds.   Abdominal:      General: Bowel sounds are normal.      Palpations: Abdomen is soft.   Musculoskeletal:      Cervical back: Normal range of motion and neck supple.      Comments: Mild tenderness in the left calf, positive Homans' sign.  No lower extremity asymmetry.  No rashes   Skin:     General: Skin is warm and dry.      Capillary Refill: Capillary refill takes less than 2 seconds.   Neurological:      General: No focal deficit present.      Mental Status: She is alert and oriented to person, place, and time.   Psychiatric:         Mood and Affect: Mood normal.         Behavior: Behavior normal.         ED Course                 Procedures          "     Critical Care time:  none               Results for orders placed or performed during the hospital encounter of 08/23/22 (from the past 24 hour(s))   US Lower Extremity Venous Duplex Left    Narrative    US LOWER EXTREMITY VENOUS DUPLEX LEFT 8/23/2022 3:15 PM    CLINICAL HISTORY: Swelling and discomfort left lower extremity rule  out DVT  TECHNIQUE: Venous Duplex ultrasound of the left lower extremity with  and without compression, augmentation and duplex. Color flow and  spectral Doppler with waveform analysis performed.    COMPARISON: None.    FINDINGS: Exam includes the common femoral, femoral, popliteal, and  contralateral common femoral veins as well as segmentally visualized  deep calf veins and greater saphenous vein.     LEFT: No deep vein thrombosis. No superficial thrombophlebitis. No  popliteal cyst.      Impression    IMPRESSION:  1.  No deep venous thrombosis in the left lower extremity.    ДМИТРИЙ MCCRARY MD         SYSTEM ID:  [id number]     4:07 PM  Ultrasound reviewed.  Negative for DVT.  Differential diagnostic considerations for her leg pain are reviewed in the room with her.  At this point I do not suspect a potentially life-threatening etiology and I recommended that she follow-up with primary care and orthopedics for further evaluation of her leg discomfort.    Medications - No data to display    Assessments & Plan (with Medical Decision Making)   Assessments and plan with medical decision making at the time stamp above.    Disclaimer: This note consists of symbols derived from keyboarding, dictation and/or voice recognition software. As a result, there may be errors in the script that have gone undetected. Please consider this when interpreting information found in this chart.      I have reviewed the nursing notes.    I have reviewed the findings, diagnosis, plan and need for follow up with the patient.            New Prescriptions    No medications on file       Final diagnoses:   Pain of  left lower leg       8/23/2022   [facility] EMERGENCY DEPT     Mo Milner MD  08/23/22 [id number]

## 2022-08-23 NOTE — DISCHARGE INSTRUCTIONS
Continue acetaminophen or oxycodone as previously directed for your pain.  Warm compresses gentle massage may be helpful.  If not improving over the course of the next 1 week please follow-up in clinic with your primary care provider for repeat evaluation and possible repeat ultrasound.  If worse or changes of concern please return to the emergency department.

## 2022-08-23 NOTE — ED NOTES
"Feels she may have a blood clot in LLE. Symptoms include tenderness and feeling like she has a \"bad Dami horse\". No other symptoms reported.  "

## 2022-10-12 ENCOUNTER — OFFICE VISIT (OUTPATIENT)
Dept: ORTHOPEDICS | Facility: CLINIC | Age: 67
End: 2022-10-12
Payer: COMMERCIAL

## 2022-10-12 VITALS
SYSTOLIC BLOOD PRESSURE: 136 MMHG | WEIGHT: 183 LBS | DIASTOLIC BLOOD PRESSURE: 85 MMHG | BODY MASS INDEX: 32.43 KG/M2 | HEIGHT: 63 IN

## 2022-10-12 DIAGNOSIS — Z96.641 HISTORY OF HIP REPLACEMENT, TOTAL, RIGHT: ICD-10-CM

## 2022-10-12 DIAGNOSIS — S76.111A STRAIN OF RIGHT QUADRICEPS, INITIAL ENCOUNTER: Primary | ICD-10-CM

## 2022-10-12 PROCEDURE — 99214 OFFICE O/P EST MOD 30 MIN: CPT | Performed by: FAMILY MEDICINE

## 2022-10-12 NOTE — LETTER
10/12/2022         RE: Dee Dee Shepherd  [address]  [facility] [address]        Dear Colleague,    Thank you for referring your patient, Dee Dee Shepherd, to the [facility]. Please see a copy of my visit note below.    Dee Dee Shepherd  :  1955  DOS: 10/12/2022  MRN: [id number]    Sports Medicine Clinic Visit    PCP: Dalia Wu    Dee Dee Shepherd is a 67 year old female who is seen as a self referral presenting with acute on chronic right anterior thigh pain.    Injury: Patient describes injury as walking yesterday and possibly twisted awkwardly noting sharp pain in right thigh.  Pain located over right proximal anterior thigh, nonradiating.  Additional Features:  Positive: weakness and muscle tightness.  Symptoms are better with Rest.  Symptoms are worse with: walking, going from sit to stand.  Other evaluation and/or treatments so far consists of: Tylenol and Rest.  Recent imaging completed: No recent imaging completed.  Prior History of related problems: history of status post right total hip arthroplasty on 22 by Dr Hernandez @ [facility].  Patient notes that she was doing relatively well post-op until yesterday.  She has not followed up with Dr Hernandez's team since 22.    Social History: retired    Review of Systems  Musculoskeletal: as above  Remainder of review of systems is negative including constitutional, CV, pulmonary, GI, Skin and Neurologic except as noted in HPI or medical history.    Past Medical History:   Diagnosis Date     Anxiety      Chronic low back pain      Depressive disorder      Hyperlipidemia      Hypertension      Malignant neoplasm of breast (female) (H)     right     Pap smear abnormality of cervix with cytologic evidence of malignancy      Tobacco abuse      Past Surgical History:   Procedure Laterality Date     ARTHROPLASTY HIP Right 2022    Procedure: Total hip arthroplasty. Right;  Surgeon: David  "Akira Riddle MD;  Location: [facility] OR     COLONOSCOPY N/A 5/3/2021    Procedure: Colonoscopy, With Polypectomy And Biopsy;  Surgeon: Ney Torrez MD;  Location: [facility]     MASTECTOMY       ZZC LIGATE FALLOPIAN TUBE       Family History   Problem Relation Age of Onset     Hypertension Father      Diabetes Brother        Objective  /85   Ht 1.6 m (5' 3\")   Wt 83 kg (183 lb)   BMI 32.42 kg/m        General: healthy, alert and in no distress      HEENT: no scleral icterus or conjunctival erythema     Skin: no suspicious lesions or rash. No jaundice.     CV: regular rhythm by palpation, 2+ distal pulses, no pedal edema      Resp: normal respiratory effort without conversational dyspnea     Psych: normal mood and affect      Gait: mildly antalgic, appropriate coordination and balance     Neuro: normal light touch sensory exam of the extremities. Motor strength as noted below     Right hip exam    Inspection:        no edema or ecchymosis in hip area    ROM:       Flexion 110, very mild pain       internal rotation 15      external rotation 30    Strength:        flexion 5/5       extension 5/5       abduction 5/5       adduction 5/5      Very mild pain with hip flexion actively, moderate and focal pain with resisted knee extension    Tender:        Quadriceps, most focal vastus intermedius and vastus lateralis    Non Tender:        remainder of hip area       illiac crest       ASIS       pubis       greater trochanter       SI joint    Sensation:        grossly intact in hip and thigh    Skin:       well perfused       capillary refill brisk    Special Tests:        neg (-) MARQUISE       Mildly painful FADIR       neg (-) scour       Neg log roll    Radiology  No formal imaging today, prior imaging and imaging reports reviewed    PELVIS PORTABLE ONE VIEW  DATE/TIME: 6/16/2022 2:56 PM     INDICATION: Status post hip surgery.  COMPARISON: None available.                                                                   "    IMPRESSION: Postop recent placement right total hip arthroplasty.  Components project in the expected position. Expected postop soft  tissue and intra-articular gas right hip region. Advanced  contralateral left hip osteoarthritis. No acute displaced pelvic  fracture is seen. Degenerative changes lower lumbar spine and both SI  Joints.    Assessment:  1. Strain of right quadriceps, initial encounter    2. History of hip replacement, total, right        Plan:  Discussed the assessment with the patient.  Follow up: 1-2 weeks prn based on progress  Asked her to keep us updated via Hello! Messenger  Acute quad strain, very mild hip joint irritability, s/p SREE 6/2022  Prior orthopedic surgery notes reviewed  Cautiously optimistic that she has not disturbed her surgical hardware, but recommended prompt f/u with orthopedic surgeon if any lingering or increasing hip joint pain  Clear quad strain on exam, improved with compression, reviewed compression strategies  Assistive device options and strategies reviewed  Work on post-op HEP as tolerated, low impact strategies reviewed  Consider PT for muscle pain that worsens or does not improve  We discussed modified progressive pain-free activity as tolerated  Anticipate recovery over 2-4 weeks for Grade I/II quad strain  Home handouts provided and supportive care reviewed  All questions were answered today  Contact us with additional questions or concerns  Signs and sx of concern reviewed      Sukhdev Nunes DO, RENATE  Sports Medicine Physician  [facility]                Disclaimer: This note consists of symbols derived from keyboarding, dictation and/or voice recognition software. As a result, there may be errors in the script that have gone undetected. Please consider this when interpreting information found in this chart.      Again, thank you for allowing me to participate in the care of your patient.        Sincerely,        Sukhdev Nunes,  DO

## 2022-10-12 NOTE — PROGRESS NOTES
Dee Dee Shepherd  :  1955  DOS: 10/12/2022  MRN: [id number]    Sports Medicine Clinic Visit    PCP: Dalia Wu    Dee Dee Shepherd is a 67 year old female who is seen as a self referral presenting with acute on chronic right anterior thigh pain.    Injury: Patient describes injury as walking yesterday and possibly twisted awkwardly noting sharp pain in right thigh.  Pain located over right proximal anterior thigh, nonradiating.  Additional Features:  Positive: weakness and muscle tightness.  Symptoms are better with Rest.  Symptoms are worse with: walking, going from sit to stand.  Other evaluation and/or treatments so far consists of: Tylenol and Rest.  Recent imaging completed: No recent imaging completed.  Prior History of related problems: history of status post right total hip arthroplasty on 22 by Dr Hernandez @ [facility].  Patient notes that she was doing relatively well post-op until yesterday.  She has not followed up with Dr Hernandez's team since 22.    Social History: retired    Review of Systems  Musculoskeletal: as above  Remainder of review of systems is negative including constitutional, CV, pulmonary, GI, Skin and Neurologic except as noted in HPI or medical history.    Past Medical History:   Diagnosis Date     Anxiety      Chronic low back pain      Depressive disorder      Hyperlipidemia      Hypertension      Malignant neoplasm of breast (female) (H)     right     Pap smear abnormality of cervix with cytologic evidence of malignancy      Tobacco abuse      Past Surgical History:   Procedure Laterality Date     ARTHROPLASTY HIP Right 2022    Procedure: Total hip arthroplasty. Right;  Surgeon: Akira Hernandez MD;  Location: [facility]     COLONOSCOPY N/A 5/3/2021    Procedure: Colonoscopy, With Polypectomy And Biopsy;  Surgeon: Ney Torrez MD;  Location: [facility]     MASTECTOMY       ZZC LIGATE FALLOPIAN TUBE       Family History   Problem Relation Age of Onset      "Hypertension Father      Diabetes Brother        Objective  /85   Ht 1.6 m (5' 3\")   Wt 83 kg (183 lb)   BMI 32.42 kg/m        General: healthy, alert and in no distress      HEENT: no scleral icterus or conjunctival erythema     Skin: no suspicious lesions or rash. No jaundice.     CV: regular rhythm by palpation, 2+ distal pulses, no pedal edema      Resp: normal respiratory effort without conversational dyspnea     Psych: normal mood and affect      Gait: mildly antalgic, appropriate coordination and balance     Neuro: normal light touch sensory exam of the extremities. Motor strength as noted below     Right hip exam    Inspection:        no edema or ecchymosis in hip area    ROM:       Flexion 110, very mild pain       internal rotation 15      external rotation 30    Strength:        flexion 5/5       extension 5/5       abduction 5/5       adduction 5/5      Very mild pain with hip flexion actively, moderate and focal pain with resisted knee extension    Tender:        Quadriceps, most focal vastus intermedius and vastus lateralis    Non Tender:        remainder of hip area       illiac crest       ASIS       pubis       greater trochanter       SI joint    Sensation:        grossly intact in hip and thigh    Skin:       well perfused       capillary refill brisk    Special Tests:        neg (-) MARQUISE       Mildly painful FADIR       neg (-) scour       Neg log roll    Radiology  No formal imaging today, prior imaging and imaging reports reviewed    PELVIS PORTABLE ONE VIEW  DATE/TIME: 6/16/2022 2:56 PM     INDICATION: Status post hip surgery.  COMPARISON: None available.                                                                      IMPRESSION: Postop recent placement right total hip arthroplasty.  Components project in the expected position. Expected postop soft  tissue and intra-articular gas right hip region. Advanced  contralateral left hip osteoarthritis. No acute displaced " pelvic  fracture is seen. Degenerative changes lower lumbar spine and both SI  Joints.    Assessment:  1. Strain of right quadriceps, initial encounter    2. History of hip replacement, total, right        Plan:  Discussed the assessment with the patient.  Follow up: 1-2 weeks prn based on progress  Asked her to keep us updated via [employer]  Acute quad strain, very mild hip joint irritability, s/p SREE 6/2022  Prior orthopedic surgery notes reviewed  Cautiously optimistic that she has not disturbed her surgical hardware, but recommended prompt f/u with orthopedic surgeon if any lingering or increasing hip joint pain  Clear quad strain on exam, improved with compression, reviewed compression strategies  Assistive device options and strategies reviewed  Work on post-op HEP as tolerated, low impact strategies reviewed  Consider PT for muscle pain that worsens or does not improve  We discussed modified progressive pain-free activity as tolerated  Anticipate recovery over 2-4 weeks for Grade I/II quad strain  Home handouts provided and supportive care reviewed  All questions were answered today  Contact us with additional questions or concerns  Signs and sx of concern reviewed      Sukhdev Nunes DO, CAQ  Sports Medicine Physician  [facility]                Disclaimer: This note consists of symbols derived from keyboarding, dictation and/or voice recognition software. As a result, there may be errors in the script that have gone undetected. Please consider this when interpreting information found in this chart.

## 2022-10-16 ENCOUNTER — HEALTH MAINTENANCE LETTER (OUTPATIENT)
Age: 67
End: 2022-10-16

## 2022-10-28 ENCOUNTER — TELEPHONE (OUTPATIENT)
Dept: PSYCHOLOGY | Facility: CLINIC | Age: 67
End: 2022-10-28

## 2022-10-28 NOTE — TELEPHONE ENCOUNTER
Client did not join video visit. This provider sent a text message and email invite to the session. This provider called patient five minutes after scheduled appointment start time and left a message. Client returned call and states she forgot about this appointment and states she is no longer desiring services.    FRANCI Sewell, LICSW

## 2022-11-16 DIAGNOSIS — F33.2 SEVERE EPISODE OF RECURRENT MAJOR DEPRESSIVE DISORDER, WITHOUT PSYCHOTIC FEATURES (H): ICD-10-CM

## 2022-11-16 DIAGNOSIS — I10 ESSENTIAL HYPERTENSION WITH GOAL BLOOD PRESSURE LESS THAN 140/90: ICD-10-CM

## 2022-11-18 RX ORDER — SERTRALINE HYDROCHLORIDE 100 MG/1
TABLET, FILM COATED ORAL
Qty: 270 TABLET | Refills: 0 | Status: SHIPPED | OUTPATIENT
Start: 2022-11-18 | End: 2023-01-31

## 2022-11-18 RX ORDER — HYDROCHLOROTHIAZIDE 25 MG/1
TABLET ORAL
Qty: 30 TABLET | Refills: 0 | Status: SHIPPED | OUTPATIENT
Start: 2022-11-18 | End: 2023-01-04

## 2022-11-18 RX ORDER — LISINOPRIL 40 MG/1
TABLET ORAL
Qty: 30 TABLET | Refills: 0 | Status: SHIPPED | OUTPATIENT
Start: 2022-11-18 | End: 2022-12-20

## 2022-11-18 NOTE — TELEPHONE ENCOUNTER
"Routing refill request to provider for review/approval because:  Labs out of range:  PHQ9   Has 12/2 med check        Requested Prescriptions   Pending Prescriptions Disp Refills     hydrochlorothiazide (HYDRODIURIL) 25 MG tablet [Pharmacy Med Name: HYDROCHLOROTHIAZIDE 25 MG TAB] 90 tablet 1     Sig: TAKE 1 TABLET DAILY (EVERY 24 HOURS)       Diuretics (Including Combos) Protocol Passed - 11/16/2022  6:18 PM        Passed - Blood pressure under 140/90 in past 12 months     BP Readings from Last 3 Encounters:   10/12/22 136/85   08/23/22 (!) 160/76   08/01/22 136/80                 Passed - Recent (12 mo) or future (30 days) visit within the authorizing provider's specialty     Patient has had an office visit with the authorizing provider or a provider within the authorizing providers department within the previous 12 mos or has a future within next 30 days. See \"Patient Info\" tab in inbasket, or \"Choose Columns\" in Meds & Orders section of the refill encounter.              Passed - Medication is active on med list        Passed - Patient is age 18 or older        Passed - No active pregancy on record        Passed - Normal serum creatinine on file in past 12 months     Recent Labs   Lab Test 06/06/22  1422   CR 0.86              Passed - Normal serum potassium on file in past 12 months     Recent Labs   Lab Test 06/06/22  1422   POTASSIUM 4.5                    Passed - Normal serum sodium on file in past 12 months     Recent Labs   Lab Test 06/06/22  1422                 Passed - No positive pregnancy test in past 12 months           lisinopril (ZESTRIL) 40 MG tablet [Pharmacy Med Name: LISINOPRIL 40 MG TABLET] 90 tablet 1     Sig: TAKE 1 TABLET BY MOUTH EVERY DAY       ACE Inhibitors (Including Combos) Protocol Passed - 11/16/2022  6:18 PM        Passed - Blood pressure under 140/90 in past 12 months     BP Readings from Last 3 Encounters:   10/12/22 136/85   08/23/22 (!) 160/76   08/01/22 136/80          " "       Passed - Recent (12 mo) or future (30 days) visit within the authorizing provider's specialty     Patient has had an office visit with the authorizing provider or a provider within the authorizing providers department within the previous 12 mos or has a future within next 30 days. See \"Patient Info\" tab in inbasket, or \"Choose Columns\" in Meds & Orders section of the refill encounter.              Passed - Medication is active on med list        Passed - Patient is age 18 or older        Passed - No active pregnancy on record        Passed - Normal serum creatinine on file in past 12 months     Recent Labs   Lab Test 06/06/22  1422   CR 0.86       Ok to refill medication if creatinine is low          Passed - Normal serum potassium on file in past 12 months     Recent Labs   Lab Test 06/06/22  1422   POTASSIUM 4.5             Passed - No positive pregnancy test within past 12 months           sertraline (ZOLOFT) 100 MG tablet [Pharmacy Med Name: SERTRALINE  MG TABLET] 270 tablet 0     Sig: TAKE 3 TABLETS BY MOUTH EVERY DAY       SSRIs Protocol Failed - 11/16/2022  6:18 PM        Failed - PHQ-9 score less than 5 in past 6 months     Please review last PHQ-9 score.           Passed - Medication is active on med list        Passed - Patient is age 18 or older        Passed - No active pregnancy on record        Passed - No positive pregnancy test in last 12 months        Passed - Recent (6 mo) or future (30 days) visit within the authorizing provider's specialty     Patient had office visit in the last 6 months or has a visit in the next 30 days with authorizing provider or within the authorizing provider's specialty.  See \"Patient Info\" tab in inbasket, or \"Choose Columns\" in Meds & Orders section of the refill encounter.                     Le Lobo RN 11/18/22 9:27 AM  "

## 2022-12-01 ENCOUNTER — VIRTUAL VISIT (OUTPATIENT)
Dept: FAMILY MEDICINE | Facility: CLINIC | Age: 67
End: 2022-12-01
Payer: COMMERCIAL

## 2022-12-01 DIAGNOSIS — F33.2 SEVERE EPISODE OF RECURRENT MAJOR DEPRESSIVE DISORDER, WITHOUT PSYCHOTIC FEATURES (H): Primary | ICD-10-CM

## 2022-12-01 PROCEDURE — 96127 BRIEF EMOTIONAL/BEHAV ASSMT: CPT | Mod: 95 | Performed by: FAMILY MEDICINE

## 2022-12-01 PROCEDURE — 99214 OFFICE O/P EST MOD 30 MIN: CPT | Mod: 95 | Performed by: FAMILY MEDICINE

## 2022-12-01 ASSESSMENT — ANXIETY QUESTIONNAIRES
1. FEELING NERVOUS, ANXIOUS, OR ON EDGE: NEARLY EVERY DAY
3. WORRYING TOO MUCH ABOUT DIFFERENT THINGS: NEARLY EVERY DAY
7. FEELING AFRAID AS IF SOMETHING AWFUL MIGHT HAPPEN: NEARLY EVERY DAY
2. NOT BEING ABLE TO STOP OR CONTROL WORRYING: NEARLY EVERY DAY
6. BECOMING EASILY ANNOYED OR IRRITABLE: SEVERAL DAYS
GAD7 TOTAL SCORE: 13
GAD7 TOTAL SCORE: 13
5. BEING SO RESTLESS THAT IT IS HARD TO SIT STILL: NOT AT ALL
IF YOU CHECKED OFF ANY PROBLEMS ON THIS QUESTIONNAIRE, HOW DIFFICULT HAVE THESE PROBLEMS MADE IT FOR YOU TO DO YOUR WORK, TAKE CARE OF THINGS AT HOME, OR GET ALONG WITH OTHER PEOPLE: EXTREMELY DIFFICULT

## 2022-12-01 ASSESSMENT — PATIENT HEALTH QUESTIONNAIRE - PHQ9
SUM OF ALL RESPONSES TO PHQ QUESTIONS 1-9: 21
5. POOR APPETITE OR OVEREATING: NOT AT ALL

## 2022-12-01 NOTE — PROGRESS NOTES
"Dee Dee is a 67 year old who is being evaluated via a billable telephone visit.      What phone number would you like to be contacted at? [phone]  How would you like to obtain your AVS? MyChart     8:32 AM      A/P:      ICD-10-CM    1. Severe episode of recurrent major depressive disorder, without psychotic features (H)  F33.2 Adult Mental Health  Referral        Reviewed again with pt importance of getting help with her medication management to try to improve symptoms.  Encouraged her to continue visit with her therapist and to be truthful about her thoughts of self harm.    Confirmed again that pt does not have intent to harm herself and has a plan to reach out to her sister for help if things worsen.  Crisis hotline info given as well.    Pt has forgotten about referral to med management.  New referral placed.  Reviewed that this could be a virtual visit as well.  She does not currently have access to her MyChart and is not sure how to do a video visit.  Might need to be a phone visit.    Scheduling number given for pt to call if she does not hear from them.      Subjective   Dee Dee is a 67 year old, presenting for the following health issues:  Depression      HPI     Depression and Anxiety Follow-Up    How are you doing with your depression since your last visit? Worsened for the last 2 months    How are you doing with your anxiety since your last visit?  Worsened for the last 2 months    Are you having other symptoms that might be associated with depression or anxiety? Yes:  \"just doesn't feel right\"    Have you had a significant life event? No     Do you have any concerns with your use of alcohol or other drugs? No    Social History     Tobacco Use     Smoking status: Every Day     Packs/day: 0.50     Years: 35.00     Pack years: 17.50     Types: Cigarettes     Smokeless tobacco: Never   Substance Use Topics     Alcohol use: Yes     Comment: weekends drinking more 3-5 drinks     Drug use: No " "    PHQ 5/27/2022 8/1/2022 12/1/2022   PHQ-9 Total Score 21 16 21   Q9: Thoughts of better off dead/self-harm past 2 weeks More than half the days More than half the days More than half the days   F/U: Thoughts of suicide or self-harm Yes Yes -   F/U: Self harm-plan Yes No -   F/U: Self-harm action No No -   F/U: Safety concerns No No -     TAMIKO-7 SCORE 5/17/2021 11/16/2021 12/1/2022   Total Score - - -   Total Score 11 5 13     Has been visiting with a therapist through [facility].  Started seeing them a couple of weeks ago.  Feels like she is \"just doing a lot of complaining\" and feels \"tired of complaining\".  States she just doesn't want to talk about it anymore.    Does not feel like she is not doing well from a mood standpoint.  States she \"doesn't want to hurt herself\".  States that she thinks about hurting herself almost everyday but does not plan to.  Has no intention of hurting herself.    Feels she is safe.  Has not actually discussed this with her therapist, just didn't think she wanted to talk about it.    States that if she felt things were worsening and she had intent to hurt herself she would reach out to her sister.      Feels she is doing okay physically.  R hip is fine, L hip bothers but is okay.  Really no concerns other then her mood.    Review of Systems   Constitutional, HEENT, cardiovascular, pulmonary, gi and gu systems are negative, except as otherwise noted.      Objective           Vitals:  No vitals were obtained today due to virtual visit.    Physical Exam   healthy, alert and no distress  PSYCH: Alert and oriented times 3; coherent speech, normal   rate and volume, able to articulate logical thoughts, able   to abstract reason, no tangential thoughts, no hallucinations   or delusions  Her affect is flat and depressed  RESP: No cough, no audible wheezing, able to talk in full sentences  Remainder of exam unable to be completed due to telephone visits    Epic reviewed        "   8:54 AM    Phone call duration: 22 minutes

## 2022-12-04 ENCOUNTER — HEALTH MAINTENANCE LETTER (OUTPATIENT)
Age: 67
End: 2022-12-04

## 2022-12-19 DIAGNOSIS — I10 ESSENTIAL HYPERTENSION WITH GOAL BLOOD PRESSURE LESS THAN 140/90: ICD-10-CM

## 2022-12-20 RX ORDER — LISINOPRIL 40 MG/1
TABLET ORAL
Qty: 90 TABLET | Refills: 1 | Status: SHIPPED | OUTPATIENT
Start: 2022-12-20 | End: 2023-10-31

## 2022-12-20 NOTE — TELEPHONE ENCOUNTER
"Last Written Prescription Date: 11/18/22  Last Fill Quantity: 30, # refills: 0  Last office visit provider: 8/1/22        Requested Prescriptions   Pending Prescriptions Disp Refills     lisinopril (ZESTRIL) 40 MG tablet [Pharmacy Med Name: LISINOPRIL 40 MG TABLET] 90 tablet 1     Sig: TAKE 1 TABLET BY MOUTH EVERY DAY       ACE Inhibitors (Including Combos) Protocol Passed - 12/19/2022  3:58 PM        Passed - Blood pressure under 140/90 in past 12 months     BP Readings from Last 3 Encounters:   10/12/22 136/85   08/23/22 (!) 160/76   08/01/22 136/80                 Passed - Recent (12 mo) or future (30 days) visit within the authorizing provider's specialty     Patient has had an office visit with the authorizing provider or a provider within the authorizing providers department within the previous 12 mos or has a future within next 30 days. See \"Patient Info\" tab in inbasket, or \"Choose Columns\" in Meds & Orders section of the refill encounter.              Passed - Medication is active on med list        Passed - Patient is age 18 or older        Passed - No active pregnancy on record        Passed - Normal serum creatinine on file in past 12 months     Recent Labs   Lab Test 06/06/22  1422   CR 0.86       Ok to refill medication if creatinine is low          Passed - Normal serum potassium on file in past 12 months     Recent Labs   Lab Test 06/06/22  1422   POTASSIUM 4.5             Passed - No positive pregnancy test within past 12 months                 Le Lobo RN 12/20/22 2:03 PM  "

## 2022-12-31 DIAGNOSIS — I10 ESSENTIAL HYPERTENSION WITH GOAL BLOOD PRESSURE LESS THAN 140/90: ICD-10-CM

## 2023-01-04 RX ORDER — HYDROCHLOROTHIAZIDE 25 MG/1
TABLET ORAL
Qty: 90 TABLET | Refills: 1 | Status: SHIPPED | OUTPATIENT
Start: 2023-01-04 | End: 2023-07-12

## 2023-01-04 NOTE — TELEPHONE ENCOUNTER
Routing refill request to provider for review/approval because:  PCP to determine if ok to send for 90 days with one refill.      NIKA Betancur

## 2023-01-31 ENCOUNTER — OFFICE VISIT (OUTPATIENT)
Dept: FAMILY MEDICINE | Facility: CLINIC | Age: 68
End: 2023-01-31
Payer: COMMERCIAL

## 2023-01-31 VITALS
HEART RATE: 93 BPM | TEMPERATURE: 97 F | DIASTOLIC BLOOD PRESSURE: 70 MMHG | WEIGHT: 180.9 LBS | BODY MASS INDEX: 32.04 KG/M2 | SYSTOLIC BLOOD PRESSURE: 122 MMHG | OXYGEN SATURATION: 97 %

## 2023-01-31 DIAGNOSIS — F10.10 ETOH ABUSE: ICD-10-CM

## 2023-01-31 DIAGNOSIS — F33.2 SEVERE EPISODE OF RECURRENT MAJOR DEPRESSIVE DISORDER, WITHOUT PSYCHOTIC FEATURES (H): Primary | ICD-10-CM

## 2023-01-31 PROBLEM — E66.01 MORBID OBESITY (H): Status: RESOLVED | Noted: 2022-05-31 | Resolved: 2023-01-31

## 2023-01-31 PROCEDURE — 99214 OFFICE O/P EST MOD 30 MIN: CPT | Mod: 25 | Performed by: FAMILY MEDICINE

## 2023-01-31 PROCEDURE — 0124A COVID-19 VACCINE BIVALENT BOOSTER 12+ (PFIZER): CPT | Performed by: FAMILY MEDICINE

## 2023-01-31 PROCEDURE — G0008 ADMIN INFLUENZA VIRUS VAC: HCPCS | Performed by: FAMILY MEDICINE

## 2023-01-31 PROCEDURE — 90662 IIV NO PRSV INCREASED AG IM: CPT | Performed by: FAMILY MEDICINE

## 2023-01-31 PROCEDURE — 91312 COVID-19 VACCINE BIVALENT BOOSTER 12+ (PFIZER): CPT | Performed by: FAMILY MEDICINE

## 2023-01-31 RX ORDER — NALTREXONE HYDROCHLORIDE 50 MG/1
1 TABLET, FILM COATED ORAL
Status: ON HOLD | COMMUNITY
Start: 2023-01-27 | End: 2023-11-08

## 2023-01-31 RX ORDER — SERTRALINE HYDROCHLORIDE 100 MG/1
200 TABLET, FILM COATED ORAL
COMMUNITY
Start: 2023-01-31

## 2023-01-31 NOTE — PATIENT INSTRUCTIONS
So glad to hear you are feeling better.  Please continue your current medications as they were prescribed at your discharge.    It is very important that you schedule follow up visits with your therapist as well as with a psychiatrist through [facility].  Their contact information is below.  Please call today to get those visits scheduled    [facility] [address]    [address]    [place] [address]    Phone: [phone]        It is also very important that you reach out to the addiction medicine/treatment group in your discharge paperwork.  It looks like they have virtual options available.  Please also give them a call to get started right away.

## 2023-01-31 NOTE — PROGRESS NOTES
A/P:      ICD-10-CM    1. Severe episode of recurrent major depressive disorder, without psychotic features (H)  F33.2       2. ETOH abuse  F10.10         Patient Instructions   So glad to hear you are feeling better.  Please continue your current medications as they were prescribed at your discharge.    It is very important that you schedule follow up visits with your therapist as well as with a psychiatrist through [facility].  Their contact information is below.  Please call today to get those visits scheduled    [facility]    [address] [place] [address] [address]    [address]    [place] [address]    Phone: [phone]        It is also very important that you reach out to the addiction medicine/treatment group in your discharge paperwork.  It looks like they have virtual options available.  Please also give them a call to get started right away.        Dami Funez is a 67 year old, presenting for the following health issues:  Hospital F/U      [facility]       Hospital Follow-up Visit:    Hospital/Nursing Home/IP Rehab Facility: [facility]   Date of Admission: 01/10/23  Date of Discharge: 01/27/23  Reason(s) for Admission: Mental Health     Was your hospitalization related to COVID-19? No   Problems taking medications regularly:  None  Medication changes since discharge: None  Problems adhering to non-medication therapy:  None    Summary of hospitalization:  CareEverywhere information obtained and reviewed  Diagnostic Tests/Treatments reviewed.  Follow up needed: none  Other Healthcare Providers Involved in Patient s Care:         Homecare and Specialist appointment - needs to schedule psychiatry and addiction medicine  Update since discharge: improved.         Plan of care communicated with patient     Has not had any alcohol since she has been home. Feels some cravings but feels like they are pretty mild.  Taking naltrexone and tolerating it well.  States she is looking into  "treatment for her alcohol use.  Not really interested in quitting completely but feels like she shouldn't drink at least until she is able to control her intake.  Seems willing to reach out to treatment options.  Notes she has their contact information on her discharge paperwork.      Feels like mood has been good since she's been home.  Sisters have been helpful with cleaning her home and getting rid of things.  Feels much neater and .  Interested in trying to keep this up.  Much more pleasant.  No recent thoughts of self harm.  Feels like her \"attitude is much better\".  Sertraline dose was decreased to 100mg daily while in the hospital ans she has continued at this dose.  Feels it is going fine.      Feels somewhat tired since discharge.  A bit sleepy and not a lot of motivation.  Was just discharged on Friday.    Review of Systems         Objective    /70   Pulse 93   Temp 97  F (36.1  C) (Tympanic)   Wt 82.1 kg (180 lb 14.4 oz)   SpO2 97%   BMI 32.04 kg/m    Body mass index is 32.04 kg/m .  Physical Exam   PE:  VS as above   Gen:  WN/WD/WH female in NAD   Heart:  RRR without murmur, nl S1, S2, no rubs or gallops   Psych: Alert and oriented times 3; coherent speech, normal   rate and volume, able to articulate logical thoughts, able   to abstract reason, no tangential thoughts, no hallucinations   or delusions  Her affect is mildly flat, fatigued      Epic and Care Everywhere reviewed                "

## 2023-02-28 ENCOUNTER — TELEPHONE (OUTPATIENT)
Dept: FAMILY MEDICINE | Facility: CLINIC | Age: 68
End: 2023-02-28

## 2023-02-28 NOTE — TELEPHONE ENCOUNTER
Order/Referral Request    Who is requesting: homecare    Orders being requested: Delay of home care admission to 3/1 due to patient going into the ER    Reason service is needed/diagnosis: delay start of care    When are orders needed by: asap    Has this been discussed with Provider: Yes    Does patient have a preference on a Group/Provider/Facility? n    Does patient have an appointment scheduled?: No    Where to send orders: Verbal orders    Could we send this information to you in [facility] or would you prefer to receive a phone call?:   Patient would prefer a phone call   Okay to leave a detailed message?: Yes at Other phone number:  [phone]

## 2023-03-01 ENCOUNTER — TELEPHONE (OUTPATIENT)
Dept: FAMILY MEDICINE | Facility: CLINIC | Age: 68
End: 2023-03-01
Payer: COMMERCIAL

## 2023-03-01 ENCOUNTER — TELEPHONE (OUTPATIENT)
Dept: FAMILY MEDICINE | Facility: CLINIC | Age: 68
End: 2023-03-01

## 2023-03-01 NOTE — TELEPHONE ENCOUNTER
Call placed to Kathleen with Home Care  Relayed verbal orders as requested    Kathleen verbalized understanding  No further questions/concerns    States patient was discharged from the ED yesterday for suicidal ideation and alcohol dependence   Patient discharged home with home care services    Jona Bernard RN

## 2023-03-01 NOTE — TELEPHONE ENCOUNTER
Call placed to Hayley with home care  Relayed Dr. Wu's message    Hayley verbalized understanding  No further questions/concerns    Jona Bernard RN

## 2023-03-01 NOTE — TELEPHONE ENCOUNTER
philipp rn with [facility] called looking for verbal orders:    Skilled nursinx in 4 days   2x week a fir 3. weejs   1x week fo r2 week    Reasons:   Suicide idealation, cognition and medication management    Social work to discuss assisted living.     OT eval and treat for cognition.     Pt eval and treat for weakness      Mariaelena JOLLEY  Station

## 2023-03-01 NOTE — TELEPHONE ENCOUNTER
"  General Call      Reason for Call:  Sudden onset dementia    What are your questions or concerns:  Pt and pt's sister calling because pt has had a \"sudden onset dementia\". They are needing Dr. Wu to put in an order for neuro-cognitive testing. Wondering if she is able to do that, or what they should do. They went to ER yesterday for this but they \"turned them away because they did not have room for her.\"       Could we send this information to you in [facility] or would you prefer to receive a phone call?:   Patient would prefer a phone call   Okay to leave a detailed message?: Yes at Cell number on file:    Telephone Information:   Mobile [phone]     " no

## 2023-03-03 ENCOUNTER — HOSPITAL ENCOUNTER (EMERGENCY)
Facility: CLINIC | Age: 68
Discharge: HOME | End: 2023-03-03
Attending: FAMILY MEDICINE | Admitting: FAMILY MEDICINE
Payer: COMMERCIAL

## 2023-03-03 ENCOUNTER — APPOINTMENT (OUTPATIENT)
Dept: CT IMAGING | Facility: CLINIC | Age: 68
End: 2023-03-03
Attending: FAMILY MEDICINE
Payer: COMMERCIAL

## 2023-03-03 VITALS
HEIGHT: 63 IN | SYSTOLIC BLOOD PRESSURE: 135 MMHG | WEIGHT: 170 LBS | RESPIRATION RATE: 16 BRPM | OXYGEN SATURATION: 96 % | HEART RATE: 70 BPM | TEMPERATURE: 98 F | DIASTOLIC BLOOD PRESSURE: 61 MMHG | BODY MASS INDEX: 30.12 KG/M2

## 2023-03-03 DIAGNOSIS — F32.2 CURRENT SEVERE EPISODE OF MAJOR DEPRESSIVE DISORDER WITHOUT PSYCHOTIC FEATURES, UNSPECIFIED WHETHER RECURRENT (H): ICD-10-CM

## 2023-03-03 DIAGNOSIS — E86.0 DEHYDRATION: ICD-10-CM

## 2023-03-03 LAB
ALBUMIN SERPL BCG-MCNC: 4.4 G/DL (ref 3.5–5.2)
ALBUMIN UR-MCNC: NEGATIVE MG/DL
ALP SERPL-CCNC: 111 U/L (ref 35–104)
ALT SERPL W P-5'-P-CCNC: 10 U/L (ref 10–35)
ANION GAP SERPL CALCULATED.3IONS-SCNC: 12 MMOL/L (ref 7–15)
APPEARANCE UR: ABNORMAL
AST SERPL W P-5'-P-CCNC: 16 U/L (ref 10–35)
BASOPHILS # BLD AUTO: 0 10E3/UL (ref 0–0.2)
BASOPHILS NFR BLD AUTO: 1 %
BILIRUB SERPL-MCNC: 0.4 MG/DL
BILIRUB UR QL STRIP: NEGATIVE
BUN SERPL-MCNC: 27.7 MG/DL (ref 8–23)
CALCIUM SERPL-MCNC: 9.6 MG/DL (ref 8.8–10.2)
CHLORIDE SERPL-SCNC: 100 MMOL/L (ref 98–107)
COLOR UR AUTO: YELLOW
CREAT SERPL-MCNC: 1.65 MG/DL (ref 0.51–0.95)
DEPRECATED HCO3 PLAS-SCNC: 25 MMOL/L (ref 22–29)
EOSINOPHIL # BLD AUTO: 0.1 10E3/UL (ref 0–0.7)
EOSINOPHIL NFR BLD AUTO: 1 %
ERYTHROCYTE [DISTWIDTH] IN BLOOD BY AUTOMATED COUNT: 13 % (ref 10–15)
GFR SERPL CREATININE-BSD FRML MDRD: 33 ML/MIN/1.73M2
GLUCOSE BLDC GLUCOMTR-MCNC: 101 MG/DL (ref 70–99)
GLUCOSE SERPL-MCNC: 107 MG/DL (ref 70–99)
GLUCOSE UR STRIP-MCNC: NEGATIVE MG/DL
HCT VFR BLD AUTO: 42.5 % (ref 35–47)
HGB BLD-MCNC: 14.5 G/DL (ref 11.7–15.7)
HGB UR QL STRIP: NEGATIVE
HOLD SPECIMEN: NORMAL
HYALINE CASTS: 25 /LPF
IMM GRANULOCYTES # BLD: 0 10E3/UL
IMM GRANULOCYTES NFR BLD: 0 %
KETONES UR STRIP-MCNC: 5 MG/DL
LACTATE SERPL-SCNC: 0.9 MMOL/L (ref 0.7–2)
LEUKOCYTE ESTERASE UR QL STRIP: NEGATIVE
LYMPHOCYTES # BLD AUTO: 1.3 10E3/UL (ref 0.8–5.3)
LYMPHOCYTES NFR BLD AUTO: 19 %
MCH RBC QN AUTO: 33.8 PG (ref 26.5–33)
MCHC RBC AUTO-ENTMCNC: 34.1 G/DL (ref 31.5–36.5)
MCV RBC AUTO: 99 FL (ref 78–100)
MONOCYTES # BLD AUTO: 0.5 10E3/UL (ref 0–1.3)
MONOCYTES NFR BLD AUTO: 7 %
MUCOUS THREADS #/AREA URNS LPF: PRESENT /LPF
NEUTROPHILS # BLD AUTO: 5 10E3/UL (ref 1.6–8.3)
NEUTROPHILS NFR BLD AUTO: 72 %
NITRATE UR QL: NEGATIVE
NRBC # BLD AUTO: 0 10E3/UL
NRBC BLD AUTO-RTO: 0 /100
PH UR STRIP: 5 [PH] (ref 5–7)
PLATELET # BLD AUTO: 202 10E3/UL (ref 150–450)
POTASSIUM SERPL-SCNC: 4 MMOL/L (ref 3.4–5.3)
PROT SERPL-MCNC: 7.1 G/DL (ref 6.4–8.3)
RBC # BLD AUTO: 4.29 10E6/UL (ref 3.8–5.2)
RBC URINE: 1 /HPF
SODIUM SERPL-SCNC: 137 MMOL/L (ref 136–145)
SP GR UR STRIP: 1.02 (ref 1–1.03)
SQUAMOUS EPITHELIAL: 45 /HPF
UROBILINOGEN UR STRIP-MCNC: 2 MG/DL
WBC # BLD AUTO: 7 10E3/UL (ref 4–11)
WBC URINE: 2 /HPF

## 2023-03-03 PROCEDURE — 96360 HYDRATION IV INFUSION INIT: CPT | Performed by: FAMILY MEDICINE

## 2023-03-03 PROCEDURE — 36415 COLL VENOUS BLD VENIPUNCTURE: CPT | Performed by: FAMILY MEDICINE

## 2023-03-03 PROCEDURE — 99284 EMERGENCY DEPT VISIT MOD MDM: CPT | Mod: 25 | Performed by: FAMILY MEDICINE

## 2023-03-03 PROCEDURE — 82962 GLUCOSE BLOOD TEST: CPT

## 2023-03-03 PROCEDURE — 70450 CT HEAD/BRAIN W/O DYE: CPT

## 2023-03-03 PROCEDURE — 85025 COMPLETE CBC W/AUTO DIFF WBC: CPT | Performed by: FAMILY MEDICINE

## 2023-03-03 PROCEDURE — 84520 ASSAY OF UREA NITROGEN: CPT | Performed by: FAMILY MEDICINE

## 2023-03-03 PROCEDURE — 258N000003 HC RX IP 258 OP 636: Performed by: FAMILY MEDICINE

## 2023-03-03 PROCEDURE — 83605 ASSAY OF LACTIC ACID: CPT | Performed by: FAMILY MEDICINE

## 2023-03-03 PROCEDURE — 99284 EMERGENCY DEPT VISIT MOD MDM: CPT | Performed by: FAMILY MEDICINE

## 2023-03-03 PROCEDURE — 81001 URINALYSIS AUTO W/SCOPE: CPT | Performed by: FAMILY MEDICINE

## 2023-03-03 RX ORDER — SODIUM CHLORIDE 9 MG/ML
INJECTION, SOLUTION INTRAVENOUS CONTINUOUS
Status: DISCONTINUED | OUTPATIENT
Start: 2023-03-03 | End: 2023-03-03 | Stop reason: HOSPADM

## 2023-03-03 RX ADMIN — SODIUM CHLORIDE 1000 ML: 9 INJECTION, SOLUTION INTRAVENOUS at 16:25

## 2023-03-03 ASSESSMENT — ENCOUNTER SYMPTOMS
DIARRHEA: 0
VOMITING: 0
VOICE CHANGE: 0
DIFFICULTY URINATING: 0
APPETITE CHANGE: 1
SPEECH DIFFICULTY: 1
CHILLS: 0
CONFUSION: 1
WEAKNESS: 1
TROUBLE SWALLOWING: 0
SORE THROAT: 0
SHORTNESS OF BREATH: 0
COUGH: 0
PALPITATIONS: 0
NAUSEA: 1
FEVER: 0
DIAPHORESIS: 0
ABDOMINAL PAIN: 0
FACIAL ASYMMETRY: 0
HEADACHES: 0
FATIGUE: 1
RHINORRHEA: 0

## 2023-03-03 ASSESSMENT — ACTIVITIES OF DAILY LIVING (ADL)
ADLS_ACUITY_SCORE: 35

## 2023-03-03 NOTE — TELEPHONE ENCOUNTER
Call placed to Patient  Talked with Patient and her sister, Arely Mcgee lives 5 blocks away and has complete control of Patient's medications, medications are at [facility] and she brings them over to Patient in the morning  Was in the ER x2 in the past week and a half  States that Patient is confused and cant focus  Home care nurse Kathleen saw Patient in January and states that Patient has changed since last time she has been seen by her  States that right corner of mouth is drooping slightly down and that Patients head droops to the right slightly  Patient has some word finding difficulty  Some times has slurred speech  Sister at house off and on during the day, states that she is always afraid of what she is going to find when she returns to Patient's house  Patient fixating on things that are not true, furnace not working, hot water heater not working, not paying her Mortgage even though none of this is true.  Has  coming on Monday  Spoke with Dr Wu  Recommended that Patient be seen in different ER  Sister states that she will bring her into [facility]  Reyes Robles RN

## 2023-03-03 NOTE — TELEPHONE ENCOUNTER
Please see telephone encounter from 3/1/2023 for further details and recommendations  Closing encounter    Jona Bernard RN

## 2023-03-03 NOTE — TELEPHONE ENCOUNTER
I can put in neuropsych testing referral but it will be months before they can get an appointment.    It looks like the ED recommended psychiatry, have they reached out to pt's psychiatrist?  Who is staying with her now for her safety?  How are her medications being managed?    Dr. Dalia Wu, DO

## 2023-03-03 NOTE — TELEPHONE ENCOUNTER
Call placed to Patient   Spoke with sister Arely  Patient is busy right now  They will call back as soon as they can  Reyes Robles RN

## 2023-03-03 NOTE — ED PROVIDER NOTES
History     Chief Complaint   Patient presents with     increasing confusion, decrease in appetite     HPI  Dee Dee Shepherd is a 67 year old female who presents with her sister is with complaint of increasing confusion, decreased appetite, abnormal urine.  Initially this was presented as a possible stroke eval but upon questioning the patient and the family members it sounds like this is a bit problems been escalating over the last several weeks.  Her home health nurse came in today noticed that she was definitely worse than she has been.  They noted that today the urine was actually dark brownish in color.  She has had a recent hip replacement and the opposite side is not working very well for her.  This contributes to gait abnormalities.  She has not really had any specific word finding or garbled speech problems.  She is not had any focal arm or leg weakness.  She has not had any facial droop or trouble swallowing.  This sounds more like a global decline as opposed to an acute neurologic event.    Allergies:  No Known Allergies    Problem List:    Patient Active Problem List    Diagnosis Date Noted     ETOH abuse 01/31/2023     Priority: Medium     Status post total replacement of hip 06/16/2022     Priority: Medium     Personal history of malignant neoplasm of breast 05/17/2018     Priority: Medium       9/09 right lumpectomy and ALND: 0.9 cm grade 2 invasive carcinoma with   2/14 nodes positive, ER/[place] negative, HER2 negative    1/10 completed 4 cycles of dose dense AC followed by 4 cycles of dose   dense Taxol    3/10 completed radiation       Severe episode of recurrent major depressive disorder, without psychotic features (H) 05/17/2018     Priority: Medium     Primary osteoarthritis of right hip 05/17/2018     Priority: Medium     Essential hypertension with goal blood pressure less than 140/90 05/14/2018     Priority: Medium     Hyperlipidemia LDL goal <130 05/14/2018     Priority: Medium     Abnormal  glucose 05/14/2018     Priority: Medium        Past Medical History:    Past Medical History:   Diagnosis Date     Anxiety      Chronic low back pain      Depressive disorder      Hyperlipidemia      Hypertension      Malignant neoplasm of breast (female) (H)      Pap smear abnormality of cervix with cytologic evidence of malignancy      Tobacco abuse        Past Surgical History:    Past Surgical History:   Procedure Laterality Date     ARTHROPLASTY HIP Right 6/16/2022    Procedure: Total hip arthroplasty. Right;  Surgeon: Akira Hernandez MD;  Location: [facility]     COLONOSCOPY N/A 5/3/2021    Procedure: Colonoscopy, With Polypectomy And Biopsy;  Surgeon: Ney Torrez MD;  Location: [facility]     MASTECTOMY       ZZC LIGATE FALLOPIAN TUBE         Family History:    Family History   Problem Relation Age of Onset     Hypertension Father      Diabetes Brother        Social History:  Marital Status:   [4]  Social History     Tobacco Use     Smoking status: Every Day     Packs/day: 0.50     Years: 35.00     Pack years: 17.50     Types: Cigarettes     Smokeless tobacco: Never   Substance Use Topics     Alcohol use: Yes     Comment: weekends drinking more 3-5 drinks     Drug use: No        Medications:    acetaminophen (TYLENOL) 325 MG tablet  ARIPiprazole (ABILIFY) 5 MG tablet  aspirin (ASA) 325 MG EC tablet  Diphenhydramine-APAP, sleep,  MG TABS  hydrochlorothiazide (HYDRODIURIL) 25 MG tablet  hydrOXYzine (ATARAX) 25 MG tablet  lisinopril (ZESTRIL) 40 MG tablet  naltrexone (DEPADE/REVIA) 50 MG tablet  potassium chloride ER (KLOR-CON) 20 MEQ CR tablet  sertraline (ZOLOFT) 100 MG tablet  simvastatin (ZOCOR) 40 MG tablet  traMADol (ULTRAM) 50 MG tablet          Review of Systems   Constitutional: Positive for appetite change and fatigue. Negative for chills, diaphoresis and fever.   HENT: Negative for congestion, ear pain, rhinorrhea, sore throat, trouble swallowing and voice change.    Eyes: Negative for  "visual disturbance.   Respiratory: Negative for cough and shortness of breath.    Cardiovascular: Negative for chest pain, palpitations and leg swelling.   Gastrointestinal: Positive for nausea. Negative for abdominal pain, diarrhea and vomiting.   Genitourinary: Positive for decreased urine volume. Negative for difficulty urinating.   Skin: Negative for rash.   Neurological: Positive for speech difficulty and weakness (Generalized). Negative for facial asymmetry and headaches.   Psychiatric/Behavioral: Positive for confusion.       Physical Exam   BP: 115/72  Pulse: 84  Temp: 98.1  F (36.7  C)  Resp: 18  Height: 160 cm (5' 3\")  Weight: 77.1 kg (170 lb)  SpO2: 98 %      Physical Exam  Vitals and nursing note reviewed.   Constitutional:       General: She is not in acute distress.     Appearance: Normal appearance. She is normal weight. She is not ill-appearing, toxic-appearing or diaphoretic.   HENT:      Head: Normocephalic and atraumatic.      Right Ear: External ear normal.      Left Ear: External ear normal.      Mouth/Throat:      Mouth: Mucous membranes are moist.      Pharynx: No posterior oropharyngeal erythema.   Eyes:      Extraocular Movements: Extraocular movements intact.      Conjunctiva/sclera: Conjunctivae normal.   Cardiovascular:      Rate and Rhythm: Normal rate and regular rhythm.      Heart sounds: No murmur heard.  Pulmonary:      Effort: Pulmonary effort is normal.      Breath sounds: Normal breath sounds.   Abdominal:      Palpations: Abdomen is soft.      Tenderness: There is no abdominal tenderness.   Musculoskeletal:         General: Normal range of motion.      Cervical back: Normal range of motion and neck supple. No tenderness.      Right lower leg: No edema.      Left lower leg: No edema.   Lymphadenopathy:      Cervical: No cervical adenopathy.   Skin:     General: Skin is warm and dry.   Neurological:      General: No focal deficit present.      Mental Status: She is alert.      " Cranial Nerves: No cranial nerve deficit.   Psychiatric:         Mood and Affect: Mood normal.         ED Course            Procedures              Critical Care time:  none               Results for orders placed or performed during the hospital encounter of 03/03/23 (from the past 24 hour(s))   Glucose by meter   Result Value Ref Range    GLUCOSE BY METER POCT 101 (H) 70 - 99 mg/dL   [place] Draw    Narrative    The following orders were created for panel order [place] Draw.  Procedure                               Abnormality         Status                     ---------                               -----------         ------                     Extra Blue Top Tube[[id number]]                              Final result               Extra Red Top Tube[[id number]]                               Final result               Extra Green Top (Lithium...[[id number]]                      Final result               Extra Purple Top Tube[[id number]]                            Final result                 Please view results for these tests on the individual orders.   Extra Blue Top Tube   Result Value Ref Range    Hold Specimen JIC    Extra Red Top Tube   Result Value Ref Range    Hold Specimen JIC    Extra Green Top (Lithium Heparin) Tube   Result Value Ref Range    Hold Specimen JIC    Extra Purple Top Tube   Result Value Ref Range    Hold Specimen JIC    CBC with platelets differential    Narrative    The following orders were created for panel order CBC with platelets differential.  Procedure                               Abnormality         Status                     ---------                               -----------         ------                     CBC with platelets and d...[[id number]]  Abnormal            Final result                 Please view results for these tests on the individual orders.   Comprehensive metabolic panel   Result Value Ref Range    Sodium 137 136 - 145 mmol/L    Potassium 4.0 3.4 - 5.3 mmol/L     Chloride 100 98 - 107 mmol/L    Carbon Dioxide (CO2) 25 22 - 29 mmol/L    Anion Gap 12 7 - 15 mmol/L    Urea Nitrogen 27.7 (H) 8.0 - 23.0 mg/dL    Creatinine 1.65 (H) 0.51 - 0.95 mg/dL    Calcium 9.6 8.8 - 10.2 mg/dL    Glucose 107 (H) 70 - 99 mg/dL    Alkaline Phosphatase 111 (H) 35 - 104 U/L    AST 16 10 - 35 U/L    ALT 10 10 - 35 U/L    Protein Total 7.1 6.4 - 8.3 g/dL    Albumin 4.4 3.5 - 5.2 g/dL    Bilirubin Total 0.4 <=1.2 mg/dL    GFR Estimate 33 (L) >60 mL/min/1.73m2   CBC with platelets and differential   Result Value Ref Range    WBC Count 7.0 4.0 - 11.0 10e3/uL    RBC Count 4.29 3.80 - 5.20 10e6/uL    Hemoglobin 14.5 11.7 - 15.7 g/dL    Hematocrit 42.5 35.0 - 47.0 %    MCV 99 78 - 100 fL    MCH 33.8 (H) 26.5 - 33.0 pg    MCHC 34.1 31.5 - 36.5 g/dL    RDW 13.0 10.0 - 15.0 %    Platelet Count 202 150 - 450 10e3/uL    % Neutrophils 72 %    % Lymphocytes 19 %    % Monocytes 7 %    % Eosinophils 1 %    % Basophils 1 %    % Immature Granulocytes 0 %    NRBCs per 100 WBC 0 <1 /100    Absolute Neutrophils 5.0 1.6 - 8.3 10e3/uL    Absolute Lymphocytes 1.3 0.8 - 5.3 10e3/uL    Absolute Monocytes 0.5 0.0 - 1.3 10e3/uL    Absolute Eosinophils 0.1 0.0 - 0.7 10e3/uL    Absolute Basophils 0.0 0.0 - 0.2 10e3/uL    Absolute Immature Granulocytes 0.0 <=0.4 10e3/uL    Absolute NRBCs 0.0 10e3/uL   CT Head w/o Contrast    Narrative    CT SCAN OF THE HEAD WITHOUT CONTRAST   3/3/2023 2:44 PM     HISTORY: Confusion.    TECHNIQUE:  Axial images of the head and coronal reformations without  IV contrast material. Radiation dose for this scan was reduced using  automated exposure control, adjustment of the mA and/or kV according  to patient size, or iterative reconstruction technique.    COMPARISON: None.    FINDINGS: There is no evidence of intracranial hemorrhage, mass, acute  infarct or anomaly. The ventricles are normal in size, shape and  configuration. Mild diffuse parenchymal volume loss. Mild patchy  periventricular white  matter hypodensities which are nonspecific, but  likely related to chronic microvascular ischemic disease.     The visualized portions of the sinuses and mastoids appear normal. The  bony calvarium and bones of the skull base appear intact.       Impression    IMPRESSION:     1. No evidence of acute intracranial hemorrhage, mass, or herniation.  2. Mild diffuse parenchymal volume loss and white matter changes  likely due to chronic microvascular ischemic disease.      JOSE SERRANO MD         SYSTEM ID:  [id number]   UA with Microscopic reflex to Culture    Specimen: Urine, Midstream   Result Value Ref Range    Color Urine Yellow Colorless, Straw, Light Yellow, Yellow    Appearance Urine Cloudy (A) Clear    Glucose Urine Negative Negative mg/dL    Bilirubin Urine Negative Negative    Ketones Urine 5 (A) Negative mg/dL    Specific Gravity Urine 1.018 1.003 - 1.035    Blood Urine Negative Negative    pH Urine 5.0 5.0 - 7.0    Protein Albumin Urine Negative Negative mg/dL    Urobilinogen Urine 2.0 Normal, 2.0 mg/dL    Nitrite Urine Negative Negative    Leukocyte Esterase Urine Negative Negative    Mucus Urine Present (A) None Seen /LPF    RBC Urine 1 <=2 /HPF    WBC Urine 2 <=5 /HPF    Squamous Epithelials Urine 45 (H) <=1 /HPF    Hyaline Casts Urine 25 (H) <=2 /LPF    Narrative    Urine Culture not indicated   Lactic acid whole blood   Result Value Ref Range    Lactic Acid 0.9 0.7 - 2.0 mmol/L       Medications   0.9% sodium chloride BOLUS (0 mLs Intravenous Stopped 3/3/23 1741)     Followed by   sodium chloride 0.9% infusion (has no administration in time range)       Assessments & Plan (with Medical Decision Making)     I have reviewed the nursing notes.    I have reviewed the findings, diagnosis, plan and need for follow up with the patient.           Medical Decision Making    67-year-old woman brought in by her siblings with concern about not doing well lately.  Evaluation was initiated with possible stroke but  that was quickly ruled out as she had no focal findings.  She has some global decreased functioning and exam and laboratory and imaging evaluation was obtained and did not identify any particular concerns.  She was slightly perhaps dehydrated and was given IV rehydration here.  Review of the chart demonstrates that she has had a few visits over the last 2 months for severe depression with suicidal ideation.  She is getting her meds now coordinated with home health so that she is not responsible for taking them herself.  Her sisters are trying to get her into assisted living.  They are also working on arranging psychiatric follow-up for her.  She is safe to return home with family supervision.  Primary care provider was notified of today's outcome as well.      New Prescriptions    No medications on file       Final diagnoses:   Dehydration   Current severe episode of major depressive disorder without psychotic features, unspecified whether recurrent (H)       3/3/2023   [facility] EMERGENCY DEPT     Alexys Kruse MD  03/03/23 6757

## 2023-03-03 NOTE — ED NOTES
Patient has ataxic gait, equal upper arm strength, facial symmetry equal, shoulder shrug off somewhat patient is confused to date  Will call code stroke. Charge RN aware

## 2023-03-03 NOTE — ED TRIAGE NOTES
C visit and care provider concerned about orientation. Patient also feeling very depressed but denies any current plans to take life.   Triage Assessment     Row Name 03/03/23 5927       Triage Assessment (Adult)    Airway WDL WDL       Respiratory WDL    Respiratory WDL WDL       Skin Circulation/Temperature WDL    Skin Circulation/Temperature WDL WDL       Cardiac WDL    Cardiac WDL WDL       Peripheral/Neurovascular WDL    Peripheral Neurovascular WDL WDL       Cognitive/Neuro/Behavioral WDL    Cognitive/Neuro/Behavioral WDL WDL

## 2023-03-04 NOTE — DISCHARGE INSTRUCTIONS
See your primary care next week.  Encourage fluids.  Follow-up with psychiatry.  Return if worsening.

## 2023-03-06 ENCOUNTER — TELEPHONE (OUTPATIENT)
Dept: FAMILY MEDICINE | Facility: CLINIC | Age: 68
End: 2023-03-06

## 2023-03-06 NOTE — TELEPHONE ENCOUNTER
Call placed to Patient   Spoke with Arely  Scheduled appointment with Dr Wu on 3/14/23 at 1040  Reyes Robles RN

## 2023-03-06 NOTE — TELEPHONE ENCOUNTER
Reason for Call:  Appointment Request    Patient requesting this type of appt:  Dementia, ER F/U, med check    Requested provider: Dalia Wu    When does patient want to be seen/preferred time: as soon as possible      Could we send this information to you in [facility] or would you prefer to receive a phone call?:   Patient would prefer a phone call   Okay to leave a detailed message?: Yes at Cell number on file:    Telephone Information:   Mobile [phone]       Call taken on 3/6/2023 at 8:24 AM by Hayley Randhawa

## 2023-03-07 ENCOUNTER — TELEPHONE (OUTPATIENT)
Dept: FAMILY MEDICINE | Facility: CLINIC | Age: 68
End: 2023-03-07
Payer: COMMERCIAL

## 2023-03-07 ENCOUNTER — TELEPHONE (OUTPATIENT)
Dept: FAMILY MEDICINE | Facility: CLINIC | Age: 68
End: 2023-03-07

## 2023-03-07 RX ORDER — TRAZODONE HYDROCHLORIDE 50 MG/1
50 TABLET ORAL AT BEDTIME
COMMUNITY
Start: 2023-03-01 | End: 2023-07-12 | Stop reason: DRUGHIGH

## 2023-03-07 NOTE — TELEPHONE ENCOUNTER
She was prescribed 30 tablets of 50 for if she took the entire dose would be 1500 mg.  At this point if she took it yesterday I am pretty sure she already would have had issues of being obtunded.  Main issue would be cardiac arrhythmia which still is very rare.  I looked through some studies and doses up to 3500 mg were not fatal.  It sure sounds like she threw it out or hid it somewhere.  Just have them watch her closely today. Vickie Garg M.D.

## 2023-03-07 NOTE — TELEPHONE ENCOUNTER
FYI - Status Update    Who is Calling: [facility]    Update: SEEN FOR HOME CARE VISIT 3/7/23, PT'S SISTER MANAGES MED- TRAZODONE WAS FILLED 3/1/23 BUT SISTER LEFT PT ALONE FOR SHORT PERIOD OF TIME 3/6 AND DISCOVERED TODAY 3/7/23 THAT MED BOTTLE IS EMPTY. PATIENT DENIED HANDLING BOTTLE- POSSIBLE PT INGESTED MED, HIDE THEM, OR THREW IT AWAY, PATIENT WAS ALERT AT VISIT. VITALS WAS STABLE. SISTER DECLINED WANTING TO TAKE PATIENT TO ER TODAY, RN DID STRESS KEEPING 24/7 VISION ON PATIENT, PATIENT NOT EAT OR RECALL LAST BOWEL MOVEMENT.    Does caller want a call/response back: Yes     Could we send this information to you in [email or web address] or would you prefer to receive a phone call?:   [facility] RN would prefer a phone call   Okay to leave a detailed message?: Yes at Other phone number:  [phone] [facility] RN - JOSE

## 2023-03-07 NOTE — TELEPHONE ENCOUNTER
Call placed to Patient and her sister Estrella  Patient was prescribed Trazadone 50 mg every evening before bed by Dr Bhatt on 1/27/23  Arely picked up refill on 3/1/23  Arely was at patient house yesterday and forgot to take patients medications with her when she left the house for 40 minutes  Bottle was in the right place when arely took them home with her last night  Arely noticed bottle was empty this morning  Spoke with Patient, states that she does not remember taking the medication  Also stated that she is feeling very depressed  Advised Arely to look around the house for medication and to monitor closely  Reyes Robles RN

## 2023-03-07 NOTE — TELEPHONE ENCOUNTER
RUSSEL Renteria, requesting orders for:  OT 2x/wk x2 wks  1x/ wk x2 wks  ADL's, cognition, safety, care support.  Verbal orders given for orders as requested.  SHOLA Velazquez RN

## 2023-03-08 NOTE — TELEPHONE ENCOUNTER
Call placed to Patients sister, Estrella Funez was doing good last night when she left Patient  States that Patient was pretty clear compared to lately  Is just heading over to Patients toña Robles RN

## 2023-03-14 ENCOUNTER — TELEPHONE (OUTPATIENT)
Dept: FAMILY MEDICINE | Facility: CLINIC | Age: 68
End: 2023-03-14

## 2023-03-14 ENCOUNTER — OFFICE VISIT (OUTPATIENT)
Dept: FAMILY MEDICINE | Facility: CLINIC | Age: 68
End: 2023-03-14
Payer: COMMERCIAL

## 2023-03-14 VITALS
SYSTOLIC BLOOD PRESSURE: 110 MMHG | BODY MASS INDEX: 30.26 KG/M2 | TEMPERATURE: 97.4 F | WEIGHT: 170.8 LBS | DIASTOLIC BLOOD PRESSURE: 62 MMHG | OXYGEN SATURATION: 99 % | HEART RATE: 100 BPM

## 2023-03-14 DIAGNOSIS — R62.7 FAILURE TO THRIVE IN ADULT: ICD-10-CM

## 2023-03-14 DIAGNOSIS — F10.10 ETOH ABUSE: ICD-10-CM

## 2023-03-14 DIAGNOSIS — F33.2 SEVERE EPISODE OF RECURRENT MAJOR DEPRESSIVE DISORDER, WITHOUT PSYCHOTIC FEATURES (H): Primary | ICD-10-CM

## 2023-03-14 PROCEDURE — 99215 OFFICE O/P EST HI 40 MIN: CPT | Performed by: FAMILY MEDICINE

## 2023-03-14 RX ORDER — SERTRALINE HYDROCHLORIDE 100 MG/1
300 TABLET, FILM COATED ORAL
COMMUNITY
End: 2023-03-14 | Stop reason: DRUGHIGH

## 2023-03-14 RX ORDER — ARIPIPRAZOLE 20 MG/1
20 TABLET ORAL
COMMUNITY
Start: 2023-02-24

## 2023-03-14 ASSESSMENT — PATIENT HEALTH QUESTIONNAIRE - PHQ9
SUM OF ALL RESPONSES TO PHQ QUESTIONS 1-9: 27
SUM OF ALL RESPONSES TO PHQ QUESTIONS 1-9: 27
10. IF YOU CHECKED OFF ANY PROBLEMS, HOW DIFFICULT HAVE THESE PROBLEMS MADE IT FOR YOU TO DO YOUR WORK, TAKE CARE OF THINGS AT HOME, OR GET ALONG WITH OTHER PEOPLE: EXTREMELY DIFFICULT

## 2023-03-14 NOTE — NURSING NOTE
"Initial /62   Pulse 100   Temp 97.4  F (36.3  C) (Tympanic)   Wt 77.5 kg (170 lb 12.8 oz)   SpO2 99%   BMI 30.26 kg/m   Estimated body mass index is 30.26 kg/m  as calculated from the following:    Height as of 3/3/23: 1.6 m (5' 3\").    Weight as of this encounter: 77.5 kg (170 lb 12.8 oz). .    Six Item Cognitive Impairment Test   (6CIT):      What year is it?                               Correct - 0 points    What month is it?                               Correct - 0 points      Give the patient an address to remember with five components:   David Figueroa ( first and last name - 2 components)   [address]  (number and name of street - 2 components)   [place] ( city - 1 component)      About what time is it (within the hour)? Correct - 0 points    Count backwards from 20 to 1:   Correct - 0 points    Say the months of the year in reverse: More than one error - 4 points    Repeat the address phrase:   Correct - 0 points    Total 6CIT Score:      4/28    Interpretation: The 6CIT uses an inverse score and questions are weighted to produce a total out of 28. Scores of 0-7 are considered normal and 8 or more significant.    Advantages The test has high sensitivity without compromising specificity even in mild dementia. It is easy to translate linguistically and culturally.  Disadvantages The main disadvantage is in the scoring and weighting of the test, which is initially confusing, however computer models have simplified this greatly.    Probability Statistics: At the 7/8 cut off: Overall figures sensitivity 90% specificity 100%, in mild dementia sensitivity = 78% , specificity = 100%    Copyright 2000 [facility], [facility], [place]. Courtesy of Dr. William Mackey    "

## 2023-03-14 NOTE — PROGRESS NOTES
A/P:      ICD-10-CM    1. Severe episode of recurrent major depressive disorder, without psychotic features (H)  F33.2       2. Failure to thrive in adult  R62.7       3. ETOH abuse  F10.10         Pt presents with sister and niece.  Has been failing at home.  Resisting food and fluids and needs to be forced to take medications.  Has had multiple ER visits.  Agree with sister's and niece's assessment that pt is not safe at home.  Reviewed that I suspect underlying cause for current symptoms is severe depression.   Discussed that I am not able to directly admit for eval and management and advised presenting to ED for medical eval/stabilization and admission for ongoing care, likely psychiatric.    Pt's family agrees.  They will transport her to ED now for assessment and appropriate management from there.    Dami Funez is a 67 year old accompanied by her sister and niece, presenting for the following health issues:  Hospital F/U      History of Present Illness       Reason for visit:  Failure to thrive fall risk not eating only sips of water for  month orthostatic unable to stand or walk on own bedridden past week some delusions unable to care for self needs dehydrated malnourished needs to be admitted to hospital then nursing home callie    She eats 0-1 servings of fruits and vegetables daily.She consumes 0 sweetened beverage(s) daily.She exercises with enough effort to increase her heart rate 9 or less minutes per day.  She exercises with enough effort to increase her heart rate 3 or less days per week. She is missing 7 dose(s) of medications per week.    Today's PHQ-9         PHQ-9 Total Score: 27    PHQ-9 Q9 Thoughts of better off dead/self-harm past 2 weeks :   Nearly every day  Thoughts of suicide or self harm: (P) Yes  Self-harm Plan:   (P) Yes  Self-harm Action:     (P) Yes  Safety concerns for self or others: (P) Yes    How difficult have these problems made it for you to do your work, take care of  "things at home, or get along with other people: Extremely difficult       Has been really struggling at home.  Was admitted to the hospital for suicide attempt.  Was doing better at her hospital follow up and has declined since.  Did follow up with psychology initially but then stopped seeing them.  Has never followed up with psychiatry since her discharge.    Has been back to the ED multiple times since end of Jan for suicidal ideation and failure to thrive.  Has been discharged to home each time.    Pt with h/o EtOH abuse.  Has not had any EtOH in the home for weeks.  Sister has been managing meds and seeing pt daily.  Has home care as well.    Pt has been progressively declining.  Per report of sister and niece pt does not eat or drink on her own, needs to be \"forced\"  Also will not take her pills unless they put them in her mouth.  Unable to get her self up and about in her home over the last week.  Has lost weight and is \"dehydrated\".    Review of Systems         Objective    /62   Pulse 100   Temp 97.4  F (36.3  C) (Tympanic)   Wt 77.5 kg (170 lb 12.8 oz)   SpO2 99%   BMI 30.26 kg/m    Body mass index is 30.26 kg/m .  Physical Exam   PE:  VS as above   Gen:  WN/WD/WH female seated in wheelchair with flat affect, poor eye contact.  Not communicative      Epic and care everywhere reviewed                "

## 2023-03-14 NOTE — TELEPHONE ENCOUNTER
Noted.  Pt seen in clinic today with sister and niece.  Failing at home with little to no oral intake, persistent suicidal ideation and severe depression.  Directed back to ED for medical eval and consideration of psych admission due to unsafe living independently at home    Dr. Dalia Wu DO

## 2023-03-14 NOTE — TELEPHONE ENCOUNTER
Phone call from [facility] with info for appt today with Dr. Wu. States information was rec'd by non- identified source with concerns of self neglect, hx suicidal ideation with attempt, decreased cognition past 1 month. ED visit 03-03-23- Dx dehydration, severe episode of major depressive disorder. Reports bottle of trazodone empty, not sure if pt took. Arely, sister, checks on pt frequently.  Julius ESPINAL has initiated elderly waiver process for homecare.  Paris suggesting cognitive testing per today appt with Dr. Wu.   SHOLA Velazquez, RN

## 2023-03-16 ENCOUNTER — TELEPHONE (OUTPATIENT)
Dept: FAMILY MEDICINE | Facility: CLINIC | Age: 68
End: 2023-03-16
Payer: COMMERCIAL

## 2023-03-16 NOTE — TELEPHONE ENCOUNTER
Call from Paris Gutiérrez with [facility] transferred to author  Paris's call back: [phone]    Paris went out to do an unannounced home visit yesterday and patient did not answer the door   Paris concerned about patient's safety given patient's past suicidal ideation and difficulty caring for self     Reports the report was made at the end of February and she received the case last week     Reviewed above with Dr. Bryce Wu states patient was admitted to [facility] updated and verbalized understanding - will contact  with [place]     Jona Bernard RN
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
## 2023-05-01 ENCOUNTER — PATIENT OUTREACH (OUTPATIENT)
Dept: CARE COORDINATION | Facility: CLINIC | Age: 68
End: 2023-05-01
Payer: COMMERCIAL

## 2023-05-15 ENCOUNTER — PATIENT OUTREACH (OUTPATIENT)
Dept: CARE COORDINATION | Facility: CLINIC | Age: 68
End: 2023-05-15
Payer: COMMERCIAL

## 2023-07-10 NOTE — PROGRESS NOTES
"  Assessment & Plan     Encounter for medical examination to establish care  Chart reviewed.    MCI (mild cognitive impairment)  Stable.    Hyperlipidemia, unspecified hyperlipidemia type  On statin.  Fasting lipids 3/2023 stable.    Essential hypertension  At goal. Continue Lisinopril and chlorthalidone.    Primary osteoarthritis of right hip  S/p replacement.    Major depressive disorder, recurrent episode with anxious distress (H)  Stable.    Tobacco use disorder  Smoking cessation advised.  Counseled on nicotine use.  - nicotine (NICORETTE) 2 MG gum; Place 1 each (2 mg) inside cheek every hour as needed for smoking cessation (Patient not taking: Reported on 7/12/2023)    Encounter for tobacco use cessation counseling    Abnormal glucose  Historical.  Recent labs normal.    Encounter for screening mammogram for breast cancer  - MA Screen Bilateral w/Frank; Future    Hip pain, left  Arthritis - severe on xray.  Formal read pending.  Referral to orthopedics.  - Orthopedic  Referral; Future  - XR Hip Left 2-3 Views; Future    Hyperlipidemia LDL goal <130  As above.  - simvastatin (ZOCOR) 40 MG tablet; Take 1 tablet (40 mg) by mouth At Bedtime TAKE 1 TABLET BY MOUTH EVERY DAY IN THE EVENING    Hypokalemia  Stable.  - potassium chloride ER (KLOR-CON) 20 MEQ CR tablet; Take 1 tablet (20 mEq) by mouth 2 times daily      35 minutes spent by me on the date of the encounter doing chart review, history and exam, documentation and further activities per the note       Nicotine/Tobacco Cessation:  She reports that she has been smoking cigarettes. She has a 17.50 pack-year smoking history. She has never used smokeless tobacco.  Nicotine/Tobacco Cessation Plan:   Pharmacotherapies : Nicotine gum      BMI:   Estimated body mass index is 30.1 kg/m  as calculated from the following:    Height as of this encounter: 1.6 m (5' 3\").    Weight as of this encounter: 77.1 kg (169 lb 14.4 oz).   Weight management plan: Discussed " healthy diet and exercise guidelines    See Patient Instructions    No follow-ups on file.    Ludy Clark MD  [facility] - QUINN Funez is a 67 year old, presenting for the following health issues:  Establish Care, Lipids, Hypertension, Depression, and Anxiety      HPI     Establish care - Wants to discuss left hip replacement referral; s/p right total hip replacement 6/2022 - doing really well.  Prior imaging.   Dr Akira Hernandez.  Side posterior.  Worse with activity.  Wakes up at night.   No prior injections.  No physical therapy.  Has cane and walker - uses prn.  No falls.  DEXA 2019 - negative.     and also mental health referral  Here with sisters - 1 is POA.    [facility] - interested in mental health referral.    Tobacco - 3 cigarettes per day    mammo - due; no prior history; but positive breast cancer in 3 of 5 sisters; no genetic testing  Breast cancer - s/p lumpectomy and chemotherapy; not estrogen related; right - 10-15 years ago    Hyperlipidemia Follow-Up - prediabetes    Are you regularly taking any medication or supplement to lower your cholesterol?   Yes- Simvastatin 40mg    Are you having muscle aches or other side effects that you think could be caused by your cholesterol lowering medication?  No     Fasting labs 3/2023 LDL 70    Hypertension Follow-up    Do you check your blood pressure regularly outside of the clinic? No     Are you following a low salt diet? No    Are your blood pressures ever more than 140 on the top number (systolic) OR more   than 90 on the bottom number (diastolic), for example 140/90? No    Depression and Anxiety Follow-Up    How are you doing with your depression since your last visit? No change    How are you doing with your anxiety since your last visit?  Improved     Are you having other symptoms that might be associated with depression or anxiety? No    Have you had a significant life event? OTHER: Patient recently moved up here to  [facility]      Do you have any concerns with your use of alcohol or other drugs? No     Alcohol - sobriety - 3/2023; no 30 day program; no sponsor or AA meetings    Tobacco -     Trazodone, abilify, zofot, naltrexone, namenda - stable    Last hospitalization - 1/2023 - [facility] - Bipolar    [facility] -     Prior ECT    Social History     Tobacco Use     Smoking status: Every Day     Packs/day: 0.50     Years: 35.00     Pack years: 17.50     Types: Cigarettes     Smokeless tobacco: Never   Substance Use Topics     Alcohol use: Yes     Comment: weekends drinking more 3-5 drinks     Drug use: No         12/1/2022     8:10 AM 3/14/2023    10:25 AM 7/12/2023     3:55 PM   PHQ   PHQ-9 Total Score 21 27 3   Q9: Thoughts of better off dead/self-harm past 2 weeks More than half the days Nearly every day Not at all   F/U: Thoughts of suicide or self-harm  Yes    F/U: Self harm-plan  Yes    F/U: Self-harm action  Yes    F/U: Safety concerns  Yes          5/17/2021     3:08 PM 11/16/2021     3:58 PM 12/1/2022     8:10 AM   TAMIKO-7 SCORE   Total Score 11 5 13         7/12/2023     3:55 PM   Last PHQ-9   1.  Little interest or pleasure in doing things 1   2.  Feeling down, depressed, or hopeless 1   3.  Trouble falling or staying asleep, or sleeping too much 0   4.  Feeling tired or having little energy 1   5.  Poor appetite or overeating 0   6.  Feeling bad about yourself 0   7.  Trouble concentrating 0   8.  Moving slowly or restless 0   Q9: Thoughts of better off dead/self-harm past 2 weeks 0   PHQ-9 Total Score 3         12/1/2022     8:10 AM   TAMIKO-7    1. Feeling nervous, anxious, or on edge 3   2. Not being able to stop or control worrying 3   3. Worrying too much about different things 3   4. Trouble relaxing 0   5. Being so restless that it is hard to sit still 0   6. Becoming easily annoyed or irritable 1   7. Feeling afraid, as if something awful might happen 3   TAMIKO-7 Total Score 13   If  "you checked any problems, how difficult have they made it for you to do your work, take care of things at home, or get along with other people? Extremely difficult       Suicide Assessment Five-step Evaluation and Treatment (SAFE-T)          Review of Systems   Constitutional, HEENT, cardiovascular, pulmonary, gi and gu systems are negative, except as otherwise noted.      Objective    /60 (BP Location: Right arm, Patient Position: Sitting, Cuff Size: Adult Regular)   Pulse 60   Temp 97.1  F (36.2  C) (Tympanic)   Ht 1.6 m (5' 3\")   Wt 77.1 kg (169 lb 14.4 oz)   SpO2 98%   BMI 30.10 kg/m    Body mass index is 30.1 kg/m .  Physical Exam   GENERAL: alert, no distress, over weight and sisters present at visit  EYES: Eyes grossly normal to inspection, PERRL and conjunctivae and sclerae normal  HENT: ear canals and TM's normal, nose and mouth without ulcers or lesions  NECK: no adenopathy, no asymmetry, masses, or scars and thyroid normal to palpation  RESP: lungs clear to auscultation - no rales, rhonchi or wheezes  CV: regular rate and rhythm, normal S1 S2, no S3 or S4, no murmur, click or rub, no peripheral edema and peripheral pulses strong  ABDOMEN: soft, nontender, no hepatosplenomegaly, no masses and bowel sounds normal  MS: normal muscle tone, no edema, peripheral pulses normal and tenderness to palpation left hip; pain with internal rotation; normal flexion; normal gait  SKIN: no suspicious lesions or rashes  NEURO: Normal strength and tone, mentation intact and speech normal  PSYCH: mentation appears normal, affect normal/bright    No results found for this or any previous visit (from the past 24 hour(s)).     Xray hip completed - severe arthritis - formal read pending.              Answers for HPI/ROS submitted by the patient on 7/12/2023  If you checked off any problems, how difficult have these problems made it for you to do your work, take care of things at home, or get along with other people?: " Not difficult at all  PHQ9 TOTAL SCORE: 3

## 2023-07-12 ENCOUNTER — OFFICE VISIT (OUTPATIENT)
Dept: FAMILY MEDICINE | Facility: OTHER | Age: 68
End: 2023-07-12
Attending: FAMILY MEDICINE
Payer: COMMERCIAL

## 2023-07-12 ENCOUNTER — ANCILLARY PROCEDURE (OUTPATIENT)
Dept: GENERAL RADIOLOGY | Facility: OTHER | Age: 68
End: 2023-07-12
Attending: FAMILY MEDICINE
Payer: COMMERCIAL

## 2023-07-12 VITALS
SYSTOLIC BLOOD PRESSURE: 108 MMHG | HEART RATE: 60 BPM | HEIGHT: 63 IN | BODY MASS INDEX: 30.11 KG/M2 | OXYGEN SATURATION: 98 % | WEIGHT: 169.9 LBS | DIASTOLIC BLOOD PRESSURE: 60 MMHG | TEMPERATURE: 97.1 F

## 2023-07-12 DIAGNOSIS — Z00.00 ENCOUNTER FOR MEDICAL EXAMINATION TO ESTABLISH CARE: Primary | ICD-10-CM

## 2023-07-12 DIAGNOSIS — M16.11 PRIMARY OSTEOARTHRITIS OF RIGHT HIP: ICD-10-CM

## 2023-07-12 DIAGNOSIS — G31.84 MCI (MILD COGNITIVE IMPAIRMENT): ICD-10-CM

## 2023-07-12 DIAGNOSIS — R73.09 ABNORMAL GLUCOSE: ICD-10-CM

## 2023-07-12 DIAGNOSIS — M25.552 HIP PAIN, LEFT: ICD-10-CM

## 2023-07-12 DIAGNOSIS — E87.6 HYPOKALEMIA: ICD-10-CM

## 2023-07-12 DIAGNOSIS — E78.5 HYPERLIPIDEMIA, UNSPECIFIED HYPERLIPIDEMIA TYPE: ICD-10-CM

## 2023-07-12 DIAGNOSIS — Z71.6 ENCOUNTER FOR TOBACCO USE CESSATION COUNSELING: ICD-10-CM

## 2023-07-12 DIAGNOSIS — E78.5 HYPERLIPIDEMIA LDL GOAL <130: ICD-10-CM

## 2023-07-12 DIAGNOSIS — Z12.31 ENCOUNTER FOR SCREENING MAMMOGRAM FOR BREAST CANCER: ICD-10-CM

## 2023-07-12 DIAGNOSIS — F33.9 MAJOR DEPRESSIVE DISORDER, RECURRENT EPISODE WITH ANXIOUS DISTRESS (H): ICD-10-CM

## 2023-07-12 DIAGNOSIS — I10 ESSENTIAL HYPERTENSION: ICD-10-CM

## 2023-07-12 DIAGNOSIS — F17.200 TOBACCO USE DISORDER: ICD-10-CM

## 2023-07-12 PROBLEM — F42.3 HOARDING DISORDER: Status: ACTIVE | Noted: 2023-01-11

## 2023-07-12 PROBLEM — H25.13 AGE-RELATED NUCLEAR CATARACT OF BOTH EYES: Status: ACTIVE | Noted: 2018-07-24

## 2023-07-12 PROBLEM — N17.9 AKI (ACUTE KIDNEY INJURY) (H): Status: ACTIVE | Noted: 2023-01-10

## 2023-07-12 PROBLEM — F42.9: Status: ACTIVE | Noted: 2023-01-18

## 2023-07-12 PROBLEM — F10.939 ALCOHOL WITHDRAWAL (H): Status: ACTIVE | Noted: 2023-01-19

## 2023-07-12 PROBLEM — T14.91XA SUICIDE ATTEMPT (H): Status: ACTIVE | Noted: 2023-01-10

## 2023-07-12 PROBLEM — G93.40 ACUTE ENCEPHALOPATHY: Status: ACTIVE | Noted: 2023-01-10

## 2023-07-12 PROCEDURE — G0463 HOSPITAL OUTPT CLINIC VISIT: HCPCS

## 2023-07-12 PROCEDURE — 99203 OFFICE O/P NEW LOW 30 MIN: CPT | Performed by: FAMILY MEDICINE

## 2023-07-12 PROCEDURE — 73502 X-RAY EXAM HIP UNI 2-3 VIEWS: CPT | Mod: TC

## 2023-07-12 RX ORDER — CHLORTHALIDONE 25 MG/1
12.5 TABLET ORAL
COMMUNITY
Start: 2023-04-26

## 2023-07-12 RX ORDER — LISINOPRIL 10 MG/1
10 TABLET ORAL
COMMUNITY
Start: 2023-04-26

## 2023-07-12 RX ORDER — MEMANTINE HYDROCHLORIDE 10 MG/1
1 TABLET ORAL 2 TIMES DAILY
COMMUNITY
Start: 2023-04-26

## 2023-07-12 RX ORDER — ARIPIPRAZOLE 5 MG/1
5 TABLET ORAL
COMMUNITY

## 2023-07-12 RX ORDER — STANDARDIZED SENNA CONCENTRATE 17.2 MG/1
1 TABLET, FILM COATED ORAL
Status: ON HOLD | COMMUNITY
Start: 2023-04-28 | End: 2023-11-07

## 2023-07-12 RX ORDER — SIMVASTATIN 40 MG
40 TABLET ORAL AT BEDTIME
COMMUNITY
Start: 2023-07-12

## 2023-07-12 RX ORDER — TRAZODONE HYDROCHLORIDE 100 MG/1
100 TABLET ORAL AT BEDTIME
COMMUNITY
Start: 2023-04-26

## 2023-07-12 RX ORDER — POTASSIUM CHLORIDE 1500 MG/1
20 TABLET, EXTENDED RELEASE ORAL 2 TIMES DAILY WITH MEALS
COMMUNITY
Start: 2023-07-12

## 2023-07-12 ASSESSMENT — PATIENT HEALTH QUESTIONNAIRE - PHQ9
SUM OF ALL RESPONSES TO PHQ QUESTIONS 1-9: 3
SUM OF ALL RESPONSES TO PHQ QUESTIONS 1-9: 3
10. IF YOU CHECKED OFF ANY PROBLEMS, HOW DIFFICULT HAVE THESE PROBLEMS MADE IT FOR YOU TO DO YOUR WORK, TAKE CARE OF THINGS AT HOME, OR GET ALONG WITH OTHER PEOPLE: NOT DIFFICULT AT ALL

## 2023-07-12 ASSESSMENT — PAIN SCALES - GENERAL: PAINLEVEL: SEVERE PAIN (6)

## 2023-07-12 NOTE — PATIENT INSTRUCTIONS
Xray today of left hip - will call with results and referral to [facility].    Mammogram ordered - radiology will call to schedule here.  [facility] [place]  Nicotine gum sent to [facility].

## 2023-07-18 ENCOUNTER — DOCUMENTATION ONLY (OUTPATIENT)
Dept: OTHER | Facility: CLINIC | Age: 68
End: 2023-07-18
Payer: COMMERCIAL

## 2023-08-15 ENCOUNTER — PREP FOR PROCEDURE (OUTPATIENT)
Dept: SURGERY | Facility: CLINIC | Age: 68
End: 2023-08-15
Payer: COMMERCIAL

## 2023-08-15 DIAGNOSIS — M16.12 PRIMARY OSTEOARTHRITIS OF LEFT HIP: Primary | ICD-10-CM

## 2023-08-26 ENCOUNTER — HEALTH MAINTENANCE LETTER (OUTPATIENT)
Age: 68
End: 2023-08-26

## 2023-09-12 ENCOUNTER — ANCILLARY PROCEDURE (OUTPATIENT)
Dept: MAMMOGRAPHY | Facility: OTHER | Age: 68
End: 2023-09-12
Attending: FAMILY MEDICINE
Payer: COMMERCIAL

## 2023-09-12 DIAGNOSIS — Z12.31 ENCOUNTER FOR SCREENING MAMMOGRAM FOR BREAST CANCER: ICD-10-CM

## 2023-09-12 PROCEDURE — 77067 SCR MAMMO BI INCL CAD: CPT | Mod: TC

## 2023-09-13 ENCOUNTER — TELEPHONE (OUTPATIENT)
Dept: MAMMOGRAPHY | Facility: OTHER | Age: 68
End: 2023-09-13

## 2023-10-26 ENCOUNTER — LAB REQUISITION (OUTPATIENT)
Dept: LAB | Facility: HOSPITAL | Age: 68
End: 2023-10-26
Payer: COMMERCIAL

## 2023-10-26 DIAGNOSIS — F33.2 MAJOR DEPRESSIVE DISORDER, RECURRENT SEVERE WITHOUT PSYCHOTIC FEATURES (H): ICD-10-CM

## 2023-10-26 LAB
ANION GAP SERPL CALCULATED.3IONS-SCNC: 11 MMOL/L (ref 7–15)
BUN SERPL-MCNC: 17.9 MG/DL (ref 8–23)
CALCIUM SERPL-MCNC: 9.6 MG/DL (ref 8.8–10.2)
CHLORIDE SERPL-SCNC: 104 MMOL/L (ref 98–107)
CREAT SERPL-MCNC: 0.81 MG/DL (ref 0.51–0.95)
DEPRECATED HCO3 PLAS-SCNC: 23 MMOL/L (ref 22–29)
EGFRCR SERPLBLD CKD-EPI 2021: 78 ML/MIN/1.73M2
ERYTHROCYTE [DISTWIDTH] IN BLOOD BY AUTOMATED COUNT: 12.5 % (ref 10–15)
EST. AVERAGE GLUCOSE BLD GHB EST-MCNC: 114 MG/DL
GLUCOSE SERPL-MCNC: 131 MG/DL (ref 70–99)
HBA1C MFR BLD: 5.6 %
HCT VFR BLD AUTO: 35.5 % (ref 35–47)
HGB BLD-MCNC: 12.6 G/DL (ref 11.7–15.7)
MCH RBC QN AUTO: 32.5 PG (ref 26.5–33)
MCHC RBC AUTO-ENTMCNC: 35.5 G/DL (ref 31.5–36.5)
MCV RBC AUTO: 92 FL (ref 78–100)
PLATELET # BLD AUTO: 184 10E3/UL (ref 150–450)
POTASSIUM SERPL-SCNC: 4.1 MMOL/L (ref 3.4–5.3)
RBC # BLD AUTO: 3.88 10E6/UL (ref 3.8–5.2)
SODIUM SERPL-SCNC: 138 MMOL/L (ref 135–145)
WBC # BLD AUTO: 5.4 10E3/UL (ref 4–11)

## 2023-10-26 PROCEDURE — 80048 BASIC METABOLIC PNL TOTAL CA: CPT | Performed by: PHYSICIAN ASSISTANT

## 2023-10-26 PROCEDURE — 85027 COMPLETE CBC AUTOMATED: CPT | Performed by: PHYSICIAN ASSISTANT

## 2023-10-26 PROCEDURE — 83036 HEMOGLOBIN GLYCOSYLATED A1C: CPT | Performed by: PHYSICIAN ASSISTANT

## 2023-10-31 ENCOUNTER — TRANSFERRED RECORDS (OUTPATIENT)
Dept: HEALTH INFORMATION MANAGEMENT | Facility: HOSPITAL | Age: 68
End: 2023-10-31

## 2023-10-31 RX ORDER — SENNOSIDES 8.6 MG
650 CAPSULE ORAL EVERY 8 HOURS PRN
Status: ON HOLD | COMMUNITY
End: 2023-11-07

## 2023-10-31 NOTE — OR NURSING
Ludy MAHER with [facility] states patient is not on ASA and will hold Naltrexone 3 days before surgery and not start it back up until patient is off narcotic pain medication after surgery.

## 2023-10-31 NOTE — OR NURSING
Email sent to total joint replacement team as follows;    Dee Dee Shepherd : 51  MRN [id number]    She is scheduled  for LTHA with Dr. Miles, PCP JENNY Kelley CNP.  Dee Dee lives at [facility].  She has short term memory difficulty due to receiving multiple shock treatments for psychiatric issues and her sister, Arely is her POA.  I spoke with [facility] nurse, Ludy and she states they will perform the two surgical showers as instructed with hibiclens soap provided and will follow pre-surgery instructions.  Ludy states patient lives independently in her own apartment and they would not be able to provide staff to assist her with ADLs.  I spoke with Dee Dee's sister, Arely and she believes and was told patient will be in hospital for three days and then go to a rehab facility.  I explained that is not a guarantee to be in hospital for three days and might be an out of pocket expense for patient.  Arely states she will call Dee Dee's insurance and check to see if they cover expenses.  Dee Dee has two brothers that live in [place], one sister lives in [place] and Arely lives in [facility].  Dee Dee had her right hip replaced in  and went to rehab facility after hospitalization.  Her apartment at [facility] is handicap accessible.  She will need a walker per [facility] staff.    Reviewed with Arely;  Call don't Fall , Visitor policy, Incentive spirometer, Possible constipation with narcotic pain medication use, Active Ice system, Wound and dressing care and signs & symptoms of infection to watch for and report right away.  Arely and Ludy did verbalized good understanding.  Dee Dee's PCP JENNY Kelley has instructed staff to hold patient's Naltrexone for three days prior to surgery and not to start this medication until patient is no longer taking narcotic pain medication after surgery.

## 2023-11-01 ENCOUNTER — ANESTHESIA EVENT (OUTPATIENT)
Dept: SURGERY | Facility: HOSPITAL | Age: 68
End: 2023-11-01
Payer: COMMERCIAL

## 2023-11-01 ASSESSMENT — LIFESTYLE VARIABLES: TOBACCO_USE: 1

## 2023-11-01 NOTE — ANESTHESIA PREPROCEDURE EVALUATION
Anesthesia Pre-Procedure Evaluation    Patient: Dee Dee Shepherd   MRN: [id number] : 1955        Procedure : Procedure(s):  Left Direct Anterior Total Hip Arthroplasty          Past Medical History:   Diagnosis Date     Anxiety      Arthritis      Chronic low back pain      Depressive disorder      Hyperlipidemia      Hypertension      Malignant neoplasm of breast (female) (H)     right     Pap smear abnormality of cervix with cytologic evidence of malignancy      Tobacco abuse       Past Surgical History:   Procedure Laterality Date     ARTHROPLASTY HIP Right 2022    Procedure: Total hip arthroplasty. Right;  Surgeon: Akira Hernandez MD;  Location: [facility] OR     BIOPSY  early     biopsy for breast cancer     BREAST SURGERY  early     lumpectomy (right breast) cosmetic (left breast)     COLONOSCOPY N/A 2021    Procedure: Colonoscopy, With Polypectomy And Biopsy;  Surgeon: Ney Torrez MD;  Location: [facility] GI     MASTECTOMY       ZZC LIGATE FALLOPIAN TUBE        No Known Allergies   Social History     Tobacco Use     Smoking status: Every Day     Packs/day: 0.50     Years: 35.00     Additional pack years: 0.00     Total pack years: 17.50     Types: Cigarettes     Smokeless tobacco: Never   Substance Use Topics     Alcohol use: Yes     Comment: weekends drinking more 3-5 drinks      Wt Readings from Last 1 Encounters:   23 77.1 kg (169 lb 14.4 oz)        Anesthesia Evaluation   Pt has had prior anesthetic. Type: MAC and General.    No history of anesthetic complications       ROS/MED HX  ENT/Pulmonary:     (+)     MECHELLE risk factors,  hypertension,         tobacco use, Current use,  50  Pack-Year Hx,                     Neurologic: Comment: Mild cognitive impairment.    : mild cognitive impairment.   Cardiovascular: Comment: Noted history of hypokalemia.     (+) Dyslipidemia hypertension- -   -  - -                                 Previous cardiac testing   Echo: Date:  Results:    Stress Test:  Date: Results:    ECG Reviewed:  Date: 2/23 Results:  NSR  Cath:  Date: Results:      METS/Exercise Tolerance: 1 - Eating, dressing    Hematologic:  - neg hematologic  ROS     Musculoskeletal:   (+)  arthritis (left hip),             GI/Hepatic:       Renal/Genitourinary:     (+) renal disease, type: CRI,            Endo: Comment: 10.26.23: A1C 5.6 (History of elevated glucose without diagnosis of diabetes)    (+)               Obesity,       Psychiatric/Substance Use:     (+) psychiatric history depression, anxiety and other (comment) (OCD, hoarding disorder, hx suicide attempt) alcohol abuse      Infectious Disease:  - neg infectious disease ROS     Malignancy:   (+) Malignancy, History of Breast.Breast CA Remission status post Surgery, Chemo and Radiation.      Other: Comment: Patient on naltrexone and instructed to stop 3 days prior to surgery - neg other ROS          Physical Exam    Airway        Mallampati: III   TM distance: > 3 FB   Neck ROM: full   Mouth opening: > 3 cm    Respiratory Devices and Support         Dental       (+) Modest Abnormalities - crowns, retainers, 1 or 2 missing teeth      Cardiovascular   cardiovascular exam normal       Rhythm and rate: regular and normal     Pulmonary   pulmonary exam normal        breath sounds clear to auscultation       OUTSIDE LABS:  CBC:   Lab Results   Component Value Date    WBC 5.4 10/26/2023    WBC 7.0 03/03/2023    HGB 12.6 10/26/2023    HGB 14.5 03/03/2023    HCT 35.5 10/26/2023    HCT 42.5 03/03/2023     10/26/2023     03/03/2023     BMP:   Lab Results   Component Value Date     10/26/2023     03/03/2023    POTASSIUM 4.1 10/26/2023    POTASSIUM 4.0 03/03/2023    CHLORIDE 104 10/26/2023    CHLORIDE 100 03/03/2023    CO2 23 10/26/2023    CO2 25 03/03/2023    BUN 17.9 10/26/2023    BUN 27.7 (H) 03/03/2023    CR 0.81 10/26/2023    CR 1.65 (H) 03/03/2023     (H) 10/26/2023     (H) 03/03/2023  "    COAGS: No results found for: \"PTT\", \"INR\", \"FIBR\"  POC: No results found for: \"BGM\", \"HCG\", \"HCGS\"  HEPATIC:   Lab Results   Component Value Date    ALBUMIN 4.4 03/03/2023    PROTTOTAL 7.1 03/03/2023    ALT 10 03/03/2023    AST 16 03/03/2023    ALKPHOS 111 (H) 03/03/2023    BILITOTAL 0.4 03/03/2023     OTHER:   Lab Results   Component Value Date    LACT 0.9 03/03/2023    A1C 5.6 10/26/2023    NESTOR 9.6 10/26/2023    PHOS 3.2 12/05/2019    LIPASE 42 (L) 03/26/2020    TSH 4.13 (H) 11/16/2021    T4 0.76 11/16/2021       Anesthesia Plan    ASA Status:  3    NPO Status:  NPO Appropriate    Anesthesia Type: Spinal.   Induction: Intravenous.   Maintenance: Balanced.        Consents    Anesthesia Plan(s) and associated risks, benefits, and realistic alternatives discussed. Questions answered and patient/representative(s) expressed understanding.     - Discussed: Risks, Benefits and Alternatives for BOTH SEDATION and the PROCEDURE were discussed     - Discussed with:  Patient      - Extended Intubation/Ventilatory Support Discussed: No.      - Patient is DNR/DNI Status: No     Use of blood products discussed: No .     Postoperative Care    Pain management: Peripheral nerve block (Single Shot), Neuraxial analgesia, Multi-modal analgesia.        Comments:    Other Comments: HP performed 10/31/23   Patient on naltrexone for alcohol hx-instructed to stop 3 days prior to surgery   Taken last 11/2/23            Nichelle Catalan APRN CNP  "

## 2023-11-06 ENCOUNTER — ANESTHESIA (OUTPATIENT)
Dept: SURGERY | Facility: HOSPITAL | Age: 68
End: 2023-11-06
Payer: COMMERCIAL

## 2023-11-06 ENCOUNTER — APPOINTMENT (OUTPATIENT)
Dept: ULTRASOUND IMAGING | Facility: HOSPITAL | Age: 68
End: 2023-11-06
Attending: NURSE ANESTHETIST, CERTIFIED REGISTERED
Payer: COMMERCIAL

## 2023-11-06 ENCOUNTER — HOSPITAL ENCOUNTER (OUTPATIENT)
Facility: HOSPITAL | Age: 68
Discharge: NURSING, REHAB OR LONG-TERM CARE | End: 2023-11-10
Attending: ORTHOPAEDIC SURGERY | Admitting: ORTHOPAEDIC SURGERY
Payer: COMMERCIAL

## 2023-11-06 ENCOUNTER — APPOINTMENT (OUTPATIENT)
Dept: GENERAL RADIOLOGY | Facility: HOSPITAL | Age: 68
End: 2023-11-06
Attending: ORTHOPAEDIC SURGERY
Payer: COMMERCIAL

## 2023-11-06 ENCOUNTER — APPOINTMENT (OUTPATIENT)
Dept: GENERAL RADIOLOGY | Facility: HOSPITAL | Age: 68
End: 2023-11-06
Payer: COMMERCIAL

## 2023-11-06 DIAGNOSIS — Z96.642 S/P TOTAL LEFT HIP ARTHROPLASTY: Primary | ICD-10-CM

## 2023-11-06 LAB — GLUCOSE BLDC GLUCOMTR-MCNC: 132 MG/DL (ref 70–99)

## 2023-11-06 PROCEDURE — 250N000013 HC RX MED GY IP 250 OP 250 PS 637

## 2023-11-06 PROCEDURE — 72170 X-RAY EXAM OF PELVIS: CPT

## 2023-11-06 PROCEDURE — 27130 TOTAL HIP ARTHROPLASTY: CPT | Performed by: NURSE ANESTHETIST, CERTIFIED REGISTERED

## 2023-11-06 PROCEDURE — C1776 JOINT DEVICE (IMPLANTABLE): HCPCS | Performed by: ORTHOPAEDIC SURGERY

## 2023-11-06 PROCEDURE — 250N000011 HC RX IP 250 OP 636: Mod: JZ

## 2023-11-06 PROCEDURE — 82962 GLUCOSE BLOOD TEST: CPT | Mod: GZ

## 2023-11-06 PROCEDURE — 258N000003 HC RX IP 258 OP 636: Performed by: NURSE ANESTHETIST, CERTIFIED REGISTERED

## 2023-11-06 PROCEDURE — 250N000009 HC RX 250: Performed by: ORTHOPAEDIC SURGERY

## 2023-11-06 PROCEDURE — 360N000084 HC SURGERY LEVEL 4 W/ FLUORO, PER MIN: Performed by: ORTHOPAEDIC SURGERY

## 2023-11-06 PROCEDURE — 999N000157 HC STATISTIC RCP TIME EA 10 MIN

## 2023-11-06 PROCEDURE — 250N000009 HC RX 250: Performed by: NURSE ANESTHETIST, CERTIFIED REGISTERED

## 2023-11-06 PROCEDURE — 250N000009 HC RX 250

## 2023-11-06 PROCEDURE — 999N000141 HC STATISTIC PRE-PROCEDURE NURSING ASSESSMENT: Performed by: ORTHOPAEDIC SURGERY

## 2023-11-06 PROCEDURE — 64447 NJX AA&/STRD FEMORAL NRV IMG: CPT | Mod: XU | Performed by: NURSE ANESTHETIST, CERTIFIED REGISTERED

## 2023-11-06 PROCEDURE — 710N000010 HC RECOVERY PHASE 1, LEVEL 2, PER MIN: Performed by: ORTHOPAEDIC SURGERY

## 2023-11-06 PROCEDURE — 272N000001 HC OR GENERAL SUPPLY STERILE: Performed by: ORTHOPAEDIC SURGERY

## 2023-11-06 PROCEDURE — 258N000003 HC RX IP 258 OP 636

## 2023-11-06 PROCEDURE — 99222 1ST HOSP IP/OBS MODERATE 55: CPT | Performed by: INTERNAL MEDICINE

## 2023-11-06 PROCEDURE — C1713 ANCHOR/SCREW BN/BN,TIS/BN: HCPCS | Performed by: ORTHOPAEDIC SURGERY

## 2023-11-06 PROCEDURE — 250N000011 HC RX IP 250 OP 636

## 2023-11-06 PROCEDURE — 370N000017 HC ANESTHESIA TECHNICAL FEE, PER MIN: Performed by: ORTHOPAEDIC SURGERY

## 2023-11-06 PROCEDURE — 27130 TOTAL HIP ARTHROPLASTY: CPT | Mod: LT | Performed by: ORTHOPAEDIC SURGERY

## 2023-11-06 PROCEDURE — 999N000179 XR SURGERY CARM FLUORO LESS THAN 5 MIN W STILLS

## 2023-11-06 PROCEDURE — 250N000011 HC RX IP 250 OP 636: Performed by: NURSE ANESTHETIST, CERTIFIED REGISTERED

## 2023-11-06 PROCEDURE — 64450 NJX AA&/STRD OTHER PN/BRANCH: CPT | Mod: XU | Performed by: NURSE ANESTHETIST, CERTIFIED REGISTERED

## 2023-11-06 PROCEDURE — 258N000003 HC RX IP 258 OP 636: Performed by: NURSE PRACTITIONER

## 2023-11-06 DEVICE — IMP HEAD FEMORAL SNR OXINIUM 12/14 36MM +0 71343600: Type: IMPLANTABLE DEVICE | Site: HIP | Status: FUNCTIONAL

## 2023-11-06 DEVICE — IMP HOLE COVER SNN ACETAB CUP REFLEC INTERFIT 71336500: Type: IMPLANTABLE DEVICE | Site: HIP | Status: FUNCTIONAL

## 2023-11-06 DEVICE — IMPLANTABLE DEVICE: Type: IMPLANTABLE DEVICE | Site: HIP | Status: FUNCTIONAL

## 2023-11-06 DEVICE — IMP SCR ACET SNN SPHERICAL HEAD 6.5X25MM 71332525: Type: IMPLANTABLE DEVICE | Site: HIP | Status: FUNCTIONAL

## 2023-11-06 DEVICE — IMP SHELL SNR ACET R3 3H 50MM 71335550: Type: IMPLANTABLE DEVICE | Site: HIP | Status: FUNCTIONAL

## 2023-11-06 RX ORDER — LISINOPRIL 10 MG/1
10 TABLET ORAL DAILY
Status: DISCONTINUED | OUTPATIENT
Start: 2023-11-07 | End: 2023-11-10 | Stop reason: HOSPADM

## 2023-11-06 RX ORDER — OXYCODONE HYDROCHLORIDE 5 MG/1
10 TABLET ORAL
Status: DISCONTINUED | OUTPATIENT
Start: 2023-11-06 | End: 2023-11-06

## 2023-11-06 RX ORDER — TRANEXAMIC ACID 10 MG/ML
1 INJECTION, SOLUTION INTRAVENOUS ONCE
Status: DISCONTINUED | OUTPATIENT
Start: 2023-11-06 | End: 2023-11-06 | Stop reason: HOSPADM

## 2023-11-06 RX ORDER — ONDANSETRON 2 MG/ML
4 INJECTION INTRAMUSCULAR; INTRAVENOUS EVERY 30 MIN PRN
Status: DISCONTINUED | OUTPATIENT
Start: 2023-11-06 | End: 2023-11-06

## 2023-11-06 RX ORDER — BUPIVACAINE HYDROCHLORIDE AND EPINEPHRINE 2.5; 5 MG/ML; UG/ML
INJECTION, SOLUTION INFILTRATION; PERINEURAL PRN
Status: DISCONTINUED | OUTPATIENT
Start: 2023-11-06 | End: 2023-11-06 | Stop reason: HOSPADM

## 2023-11-06 RX ORDER — BUPIVACAINE HYDROCHLORIDE 2.5 MG/ML
INJECTION, SOLUTION EPIDURAL; INFILTRATION; INTRACAUDAL
Status: COMPLETED | OUTPATIENT
Start: 2023-11-06 | End: 2023-11-06

## 2023-11-06 RX ORDER — POLYETHYLENE GLYCOL 3350 17 G/17G
17 POWDER, FOR SOLUTION ORAL DAILY
Status: DISCONTINUED | OUTPATIENT
Start: 2023-11-07 | End: 2023-11-10 | Stop reason: HOSPADM

## 2023-11-06 RX ORDER — AMOXICILLIN 250 MG
1 CAPSULE ORAL 2 TIMES DAILY
Status: DISCONTINUED | OUTPATIENT
Start: 2023-11-06 | End: 2023-11-10 | Stop reason: HOSPADM

## 2023-11-06 RX ORDER — ONDANSETRON 2 MG/ML
4 INJECTION INTRAMUSCULAR; INTRAVENOUS EVERY 6 HOURS PRN
Status: DISCONTINUED | OUTPATIENT
Start: 2023-11-06 | End: 2023-11-10 | Stop reason: HOSPADM

## 2023-11-06 RX ORDER — CEFAZOLIN SODIUM/WATER 2 G/20 ML
2 SYRINGE (ML) INTRAVENOUS
Status: COMPLETED | OUTPATIENT
Start: 2023-11-06 | End: 2023-11-06

## 2023-11-06 RX ORDER — ONDANSETRON 4 MG/1
4 TABLET, ORALLY DISINTEGRATING ORAL EVERY 30 MIN PRN
Status: DISCONTINUED | OUTPATIENT
Start: 2023-11-06 | End: 2023-11-06

## 2023-11-06 RX ORDER — OXYCODONE HYDROCHLORIDE 5 MG/1
5-10 TABLET ORAL EVERY 4 HOURS PRN
Qty: 30 TABLET | Refills: 0 | Status: SHIPPED | OUTPATIENT
Start: 2023-11-06

## 2023-11-06 RX ORDER — TRAZODONE HYDROCHLORIDE 50 MG/1
100 TABLET, FILM COATED ORAL AT BEDTIME
Status: DISCONTINUED | OUTPATIENT
Start: 2023-11-06 | End: 2023-11-10 | Stop reason: HOSPADM

## 2023-11-06 RX ORDER — DEXAMETHASONE SODIUM PHOSPHATE 10 MG/ML
INJECTION, SOLUTION INTRAMUSCULAR; INTRAVENOUS
Status: COMPLETED | OUTPATIENT
Start: 2023-11-06 | End: 2023-11-06

## 2023-11-06 RX ORDER — ASPIRIN 81 MG/1
81 TABLET ORAL 2 TIMES DAILY
Status: DISCONTINUED | OUTPATIENT
Start: 2023-11-06 | End: 2023-11-10 | Stop reason: HOSPADM

## 2023-11-06 RX ORDER — NALOXONE HYDROCHLORIDE 0.4 MG/ML
0.2 INJECTION, SOLUTION INTRAMUSCULAR; INTRAVENOUS; SUBCUTANEOUS
Status: DISCONTINUED | OUTPATIENT
Start: 2023-11-06 | End: 2023-11-10 | Stop reason: HOSPADM

## 2023-11-06 RX ORDER — ASPIRIN 81 MG/1
81 TABLET ORAL 2 TIMES DAILY
Qty: 60 TABLET | Refills: 0 | Status: SHIPPED | OUTPATIENT
Start: 2023-11-06

## 2023-11-06 RX ORDER — PROCHLORPERAZINE MALEATE 5 MG
5 TABLET ORAL EVERY 6 HOURS PRN
Status: DISCONTINUED | OUTPATIENT
Start: 2023-11-06 | End: 2023-11-10 | Stop reason: HOSPADM

## 2023-11-06 RX ORDER — PROPOFOL 10 MG/ML
INJECTION, EMULSION INTRAVENOUS PRN
Status: DISCONTINUED | OUTPATIENT
Start: 2023-11-06 | End: 2023-11-06

## 2023-11-06 RX ORDER — LIDOCAINE 40 MG/G
CREAM TOPICAL
Status: DISCONTINUED | OUTPATIENT
Start: 2023-11-06 | End: 2023-11-06 | Stop reason: HOSPADM

## 2023-11-06 RX ORDER — OXYCODONE HYDROCHLORIDE 5 MG/1
5 TABLET ORAL EVERY 4 HOURS PRN
Status: DISCONTINUED | OUTPATIENT
Start: 2023-11-06 | End: 2023-11-10 | Stop reason: HOSPADM

## 2023-11-06 RX ORDER — OXYCODONE HYDROCHLORIDE 5 MG/1
5 TABLET ORAL
Status: DISCONTINUED | OUTPATIENT
Start: 2023-11-06 | End: 2023-11-06

## 2023-11-06 RX ORDER — BUPIVACAINE HYDROCHLORIDE AND EPINEPHRINE 2.5; 5 MG/ML; UG/ML
INJECTION, SOLUTION EPIDURAL; INFILTRATION; INTRACAUDAL; PERINEURAL
Status: DISCONTINUED
Start: 2023-11-06 | End: 2023-11-06 | Stop reason: HOSPADM

## 2023-11-06 RX ORDER — CEFAZOLIN SODIUM/WATER 2 G/20 ML
2 SYRINGE (ML) INTRAVENOUS SEE ADMIN INSTRUCTIONS
Status: DISCONTINUED | OUTPATIENT
Start: 2023-11-06 | End: 2023-11-06 | Stop reason: HOSPADM

## 2023-11-06 RX ORDER — HYDROMORPHONE HCL IN WATER/PF 6 MG/30 ML
0.4 PATIENT CONTROLLED ANALGESIA SYRINGE INTRAVENOUS
Status: DISCONTINUED | OUTPATIENT
Start: 2023-11-06 | End: 2023-11-10 | Stop reason: HOSPADM

## 2023-11-06 RX ORDER — PROPOFOL 10 MG/ML
INJECTION, EMULSION INTRAVENOUS CONTINUOUS PRN
Status: DISCONTINUED | OUTPATIENT
Start: 2023-11-06 | End: 2023-11-06

## 2023-11-06 RX ORDER — LIDOCAINE HYDROCHLORIDE 20 MG/ML
INJECTION, SOLUTION INFILTRATION; PERINEURAL PRN
Status: DISCONTINUED | OUTPATIENT
Start: 2023-11-06 | End: 2023-11-06

## 2023-11-06 RX ORDER — ONDANSETRON 4 MG/1
4 TABLET, ORALLY DISINTEGRATING ORAL EVERY 6 HOURS PRN
Status: DISCONTINUED | OUTPATIENT
Start: 2023-11-06 | End: 2023-11-10 | Stop reason: HOSPADM

## 2023-11-06 RX ORDER — ACETAMINOPHEN 325 MG/1
650 TABLET ORAL EVERY 4 HOURS PRN
Status: DISCONTINUED | OUTPATIENT
Start: 2023-11-09 | End: 2023-11-10 | Stop reason: HOSPADM

## 2023-11-06 RX ORDER — SODIUM CHLORIDE, SODIUM LACTATE, POTASSIUM CHLORIDE, CALCIUM CHLORIDE 600; 310; 30; 20 MG/100ML; MG/100ML; MG/100ML; MG/100ML
INJECTION, SOLUTION INTRAVENOUS CONTINUOUS
Status: DISCONTINUED | OUTPATIENT
Start: 2023-11-06 | End: 2023-11-06 | Stop reason: HOSPADM

## 2023-11-06 RX ORDER — LIDOCAINE 40 MG/G
CREAM TOPICAL
Status: DISCONTINUED | OUTPATIENT
Start: 2023-11-06 | End: 2023-11-10 | Stop reason: HOSPADM

## 2023-11-06 RX ORDER — NALOXONE HYDROCHLORIDE 0.4 MG/ML
0.4 INJECTION, SOLUTION INTRAMUSCULAR; INTRAVENOUS; SUBCUTANEOUS
Status: DISCONTINUED | OUTPATIENT
Start: 2023-11-06 | End: 2023-11-10 | Stop reason: HOSPADM

## 2023-11-06 RX ORDER — FENTANYL CITRATE 50 UG/ML
INJECTION, SOLUTION INTRAMUSCULAR; INTRAVENOUS PRN
Status: DISCONTINUED | OUTPATIENT
Start: 2023-11-06 | End: 2023-11-06

## 2023-11-06 RX ORDER — MEMANTINE HYDROCHLORIDE 10 MG/1
10 TABLET ORAL
Status: DISCONTINUED | OUTPATIENT
Start: 2023-11-06 | End: 2023-11-08

## 2023-11-06 RX ORDER — ACETAMINOPHEN 325 MG/1
650 TABLET ORAL EVERY 4 HOURS PRN
Qty: 100 TABLET | Refills: 0 | Status: SHIPPED | OUTPATIENT
Start: 2023-11-06 | End: 2023-11-08

## 2023-11-06 RX ORDER — SIMVASTATIN 40 MG
40 TABLET ORAL AT BEDTIME
Status: DISCONTINUED | OUTPATIENT
Start: 2023-11-06 | End: 2023-11-10 | Stop reason: HOSPADM

## 2023-11-06 RX ORDER — SODIUM CHLORIDE, SODIUM LACTATE, POTASSIUM CHLORIDE, CALCIUM CHLORIDE 600; 310; 30; 20 MG/100ML; MG/100ML; MG/100ML; MG/100ML
INJECTION, SOLUTION INTRAVENOUS CONTINUOUS
Status: DISCONTINUED | OUTPATIENT
Start: 2023-11-06 | End: 2023-11-10 | Stop reason: HOSPADM

## 2023-11-06 RX ORDER — CEFAZOLIN SODIUM 2 G/100ML
2 INJECTION, SOLUTION INTRAVENOUS EVERY 8 HOURS
Qty: 200 ML | Refills: 0 | Status: COMPLETED | OUTPATIENT
Start: 2023-11-06 | End: 2023-11-07

## 2023-11-06 RX ORDER — POTASSIUM CHLORIDE 1500 MG/1
20 TABLET, EXTENDED RELEASE ORAL 2 TIMES DAILY
Status: DISCONTINUED | OUTPATIENT
Start: 2023-11-06 | End: 2023-11-10 | Stop reason: HOSPADM

## 2023-11-06 RX ORDER — ARIPIPRAZOLE 5 MG/1
5 TABLET ORAL DAILY
Status: DISCONTINUED | OUTPATIENT
Start: 2023-11-06 | End: 2023-11-08

## 2023-11-06 RX ORDER — ARIPIPRAZOLE 10 MG/1
20 TABLET ORAL ONCE
Qty: 2 TABLET | Refills: 0 | Status: COMPLETED | OUTPATIENT
Start: 2023-11-06 | End: 2023-11-06

## 2023-11-06 RX ORDER — OXYCODONE HYDROCHLORIDE 5 MG/1
10 TABLET ORAL EVERY 4 HOURS PRN
Status: DISCONTINUED | OUTPATIENT
Start: 2023-11-06 | End: 2023-11-10 | Stop reason: HOSPADM

## 2023-11-06 RX ORDER — BUPIVACAINE HYDROCHLORIDE 7.5 MG/ML
INJECTION, SOLUTION INTRASPINAL PRN
Status: DISCONTINUED | OUTPATIENT
Start: 2023-11-06 | End: 2023-11-06

## 2023-11-06 RX ORDER — ACETAMINOPHEN 325 MG/1
975 TABLET ORAL EVERY 8 HOURS
Qty: 27 TABLET | Refills: 0 | Status: COMPLETED | OUTPATIENT
Start: 2023-11-06 | End: 2023-11-09

## 2023-11-06 RX ORDER — HYDROMORPHONE HCL IN WATER/PF 6 MG/30 ML
0.2 PATIENT CONTROLLED ANALGESIA SYRINGE INTRAVENOUS
Status: DISCONTINUED | OUTPATIENT
Start: 2023-11-06 | End: 2023-11-10 | Stop reason: HOSPADM

## 2023-11-06 RX ORDER — BISACODYL 10 MG
10 SUPPOSITORY, RECTAL RECTAL DAILY PRN
Status: DISCONTINUED | OUTPATIENT
Start: 2023-11-06 | End: 2023-11-10 | Stop reason: HOSPADM

## 2023-11-06 RX ADMIN — BUPIVACAINE HYDROCHLORIDE 20 ML: 2.5 INJECTION, SOLUTION EPIDURAL; INFILTRATION; INTRACAUDAL at 10:20

## 2023-11-06 RX ADMIN — LIDOCAINE HYDROCHLORIDE 40 MG: 20 INJECTION, SOLUTION INFILTRATION; PERINEURAL at 11:22

## 2023-11-06 RX ADMIN — BUPIVACAINE HYDROCHLORIDE IN DEXTROSE 1.7 ML: 7.5 INJECTION, SOLUTION SUBARACHNOID at 11:17

## 2023-11-06 RX ADMIN — OXYCODONE HYDROCHLORIDE 10 MG: 5 TABLET ORAL at 23:30

## 2023-11-06 RX ADMIN — CEFAZOLIN 2 G: 10 INJECTION, POWDER, FOR SOLUTION INTRAVENOUS; PARENTERAL at 10:34

## 2023-11-06 RX ADMIN — SIMVASTATIN 40 MG: 40 TABLET, FILM COATED ORAL at 21:52

## 2023-11-06 RX ADMIN — OXYCODONE HYDROCHLORIDE 5 MG: 5 TABLET ORAL at 18:31

## 2023-11-06 RX ADMIN — POTASSIUM CHLORIDE 20 MEQ: 1500 TABLET, EXTENDED RELEASE ORAL at 20:39

## 2023-11-06 RX ADMIN — CEFAZOLIN SODIUM 2 G: 2 INJECTION, SOLUTION INTRAVENOUS at 18:33

## 2023-11-06 RX ADMIN — CHLORTHALIDONE 12.5 MG: 25 TABLET ORAL at 15:54

## 2023-11-06 RX ADMIN — MEMANTINE 10 MG: 10 TABLET ORAL at 15:54

## 2023-11-06 RX ADMIN — FENTANYL CITRATE 25 MCG: 50 INJECTION INTRAMUSCULAR; INTRAVENOUS at 11:14

## 2023-11-06 RX ADMIN — BUPIVACAINE HYDROCHLORIDE 8 ML: 2.5 INJECTION, SOLUTION EPIDURAL; INFILTRATION; INTRACAUDAL at 10:23

## 2023-11-06 RX ADMIN — TRAZODONE HYDROCHLORIDE 100 MG: 50 TABLET ORAL at 21:52

## 2023-11-06 RX ADMIN — DEXAMETHASONE SODIUM PHOSPHATE 4 MG: 10 INJECTION, SOLUTION INTRAMUSCULAR; INTRAVENOUS at 10:21

## 2023-11-06 RX ADMIN — PROPOFOL 50 MCG/KG/MIN: 10 INJECTION, EMULSION INTRAVENOUS at 11:22

## 2023-11-06 RX ADMIN — SODIUM CHLORIDE, POTASSIUM CHLORIDE, SODIUM LACTATE AND CALCIUM CHLORIDE: 600; 310; 30; 20 INJECTION, SOLUTION INTRAVENOUS at 09:20

## 2023-11-06 RX ADMIN — TRANEXAMIC ACID 1 G: 1 INJECTION, SOLUTION INTRAVENOUS at 11:15

## 2023-11-06 RX ADMIN — DEXAMETHASONE SODIUM PHOSPHATE 6 MG: 10 INJECTION, SOLUTION INTRAMUSCULAR; INTRAVENOUS at 10:20

## 2023-11-06 RX ADMIN — SENNOSIDES AND DOCUSATE SODIUM 1 TABLET: 50; 8.6 TABLET ORAL at 20:39

## 2023-11-06 RX ADMIN — ACETAMINOPHEN 975 MG: 325 TABLET, FILM COATED ORAL at 14:53

## 2023-11-06 RX ADMIN — SERTRALINE HYDROCHLORIDE 200 MG: 50 TABLET ORAL at 15:54

## 2023-11-06 RX ADMIN — OXYCODONE HYDROCHLORIDE 5 MG: 5 TABLET ORAL at 17:39

## 2023-11-06 RX ADMIN — Medication 1 G: at 11:34

## 2023-11-06 RX ADMIN — SODIUM CHLORIDE, POTASSIUM CHLORIDE, SODIUM LACTATE AND CALCIUM CHLORIDE: 600; 310; 30; 20 INJECTION, SOLUTION INTRAVENOUS at 14:30

## 2023-11-06 RX ADMIN — ASPIRIN 81 MG: 81 TABLET, COATED ORAL at 20:39

## 2023-11-06 RX ADMIN — ARIPIPRAZOLE 5 MG: 5 TABLET ORAL at 15:54

## 2023-11-06 RX ADMIN — HYDROMORPHONE HYDROCHLORIDE 0.2 MG: 0.2 INJECTION, SOLUTION INTRAMUSCULAR; INTRAVENOUS; SUBCUTANEOUS at 20:40

## 2023-11-06 RX ADMIN — ARIPIPRAZOLE 20 MG: 10 TABLET ORAL at 15:54

## 2023-11-06 RX ADMIN — FENTANYL CITRATE 25 MCG: 50 INJECTION INTRAMUSCULAR; INTRAVENOUS at 11:01

## 2023-11-06 RX ADMIN — FENTANYL CITRATE 25 MCG: 50 INJECTION INTRAMUSCULAR; INTRAVENOUS at 10:58

## 2023-11-06 RX ADMIN — ACETAMINOPHEN 975 MG: 325 TABLET, FILM COATED ORAL at 21:52

## 2023-11-06 RX ADMIN — PROPOFOL 30 MG: 10 INJECTION, EMULSION INTRAVENOUS at 11:22

## 2023-11-06 RX ADMIN — MIDAZOLAM 0.5 MG: 1 INJECTION INTRAMUSCULAR; INTRAVENOUS at 10:56

## 2023-11-06 ASSESSMENT — ACTIVITIES OF DAILY LIVING (ADL)
ADLS_ACUITY_SCORE: 22
ADLS_ACUITY_SCORE: 22
ADLS_ACUITY_SCORE: 35
ADLS_ACUITY_SCORE: 25
ADLS_ACUITY_SCORE: 22

## 2023-11-06 NOTE — ANESTHESIA PROCEDURE NOTES
Other (Lateral femoral cutaneous) Procedure Note    Pre-Procedure   Staff -        Resident/Fellow: Marcus Killian       CRNA: Tanesha Olivia APRN CRNA       Performed By: CRNA and ZIGGY       Location: pre-op       Procedure Start/Stop Times: 11/6/2023 10:21 AM and 11/6/2023 10:23 AM       Pre-Anesthestic Checklist: patient identified, IV checked, site marked, risks and benefits discussed, informed consent, monitors and equipment checked, pre-op evaluation, at physician/surgeon's request and post-op pain management  Timeout:       Correct Patient: Yes        Correct Procedure: Yes        Correct Site: Yes        Correct Position: Yes        Correct Laterality: Yes        Site Marked: Yes  Procedure Documentation  Procedure: Other (Lateral femoral cutaneous)       Diagnosis: LEFT DIRECT ANTERIOR TOTAL HIP ARTHROPLASTY (LEFT: HIP       Laterality: left       Patient Position: supine       Skin prep: Chloraprep       Needle Type: insulated and short bevel       Needle Gauge: 20.        Needle Length (Inches): 4        Ultrasound guided       1. Ultrasound was used to identify targeted nerve, plexus, vascular marker, or fascial plane and place a needle adjacent to it in real-time.       2. Ultrasound was used to visualize the spread of anesthetic in close proximity to the above referenced structure.       3. A permanent image is entered into the patient's record.       4. The visualized anatomic structures appeared normal.       5. There were no apparent abnormal pathologic findings.    Assessment/Narrative         The placement was negative for: blood aspirated, painful injection and site bleeding       Paresthesias: No.       Bolus given via needle. no blood aspirated via catheter.        Secured via.        Insertion/Infusion Method: Single Shot       Complications: none       Injection made incrementally with aspirations every 2 mL.    Medication(s) Administered   Bupivacaine 0.25% PF (Infiltration) -  "Infiltration   8 mL - 11/6/2023 10:23:00 AM  Dexamethasone 10 mg/mL PF (Perineural) - Infiltration   4 mg - 11/6/2023 10:21:00 AM  Medication Administration Time: 11/6/2023 10:21 AM      FOR [facility] ONLY:   Pain Team Contact information: please page the Pain Team Via [employer]. Search \"Pain\". During daytime hours, please page the attending first. At night please page the resident first.      "

## 2023-11-06 NOTE — PROGRESS NOTES
"[facility]    Hospitalist Progress Note    Date of Service (when I saw the patient): 11/06/2023    Assessment & Plan   Dee Dee Shepherd is a 68 year old female who was admitted on 11/6/2023.     Status post elective left direct anterior hip total arthroplasty postoperative day #0:   -Postop cares and protocol per Ortho    Essential hypertension: BP and heart rate within normal limits and stable  -Continue chlorthalidone, lisinopril    Chronic kidney disease: Pre-op creatinine 0.81  -Monitor renal function and urine output    Depression: Continue Abilify, Zoloft, trazodone    History of alcohol abuse: Patient is on naltrexone as an outpatient    FEN: Oral diet as tolerated    Clinically Significant Risk Factors Present on Admission                  # Hypertension: Noted on problem list      # Overweight: Estimated body mass index is 28.87 kg/m  as calculated from the following:    Height as of this encounter: 1.6 m (5' 3\").    Weight as of this encounter: 73.9 kg (163 lb).                DVT Prophylaxis: Defer to primary service    Code Status: Full Code    Disposition: Expected discharge tomorrow per orthopedics.    Rudy Kitchen MD, MD        Interval History   Patient seen in room.  Operative pain controlled.  Patient denies chest pain, shortness of breath, abdominal pain, nausea.    -Data reviewed today: I reviewed all new labs and imaging results over the last 24 hours. I personally reviewed imaging reports.    Physical Exam   Temp: 96.8  F (36  C) Temp src: Tympanic BP: 102/60 Pulse: 78   Resp: 13 SpO2: 98 % O2 Device: None (Room air)    Vitals:    11/06/23 0846   Weight: 73.9 kg (163 lb)     Vital Signs with Ranges  Temp:  [96.8  F (36  C)-98.4  F (36.9  C)] 96.8  F (36  C)  Pulse:  [55-78] 78  Resp:  [12-20] 13  BP: ()/(52-95) 102/60  SpO2:  [97 %-99 %] 98 %    Intake/Output Summary (Last 24 hours) at 11/6/2023 1524  Last data filed at 11/6/2023 1249  Gross per 24 hour   Intake 900 ml "   Output --   Net 900 ml       Peripheral IV 11/06/23 Right Hand (Active)   Site Assessment WDL 11/06/23 1423   Line Status Saline locked 11/06/23 1423   Dressing Transparent 11/06/23 1423   Dressing Status clean;dry;intact 11/06/23 1423   Line Intervention Flushed 11/06/23 1423   Phlebitis Scale 0-->no symptoms 11/06/23 1423   Infiltration? no 11/06/23 1423   Number of days: 0       Incision/Surgical Site 06/16/22 Right Hip (Active)   Number of days: 508       Incision/Surgical Site 11/06/23 Left Hip (Active)   Incision Assessment UTV 11/06/23 1346   Closure SHAHNAZ 11/06/23 1346   Incision Drainage Amount UTV 11/06/23 1346   Dressing Intervention Clean, dry, intact 11/06/23 1346   Number of days: 0     No line/device    Constitutional - AA, NAD  HEENT - atraumatic, normocephalic  Neck - supple, no masses, no JVD  CVS - S1 S2 RRR, no murmurs, rubs, gallops  Respiratory - CTA b/l  GI - soft, NT, ND, + bowel sounds, no organomegaly  Musculoskeletal - no LE edema, no lesions  Neuro - oriented x 3, no gross focal deficits      Medications    lactated ringers 75 mL/hr at 11/06/23 1430      acetaminophen  975 mg Oral Q8H    ARIPiprazole  20 mg Oral Once    ARIPiprazole  5 mg Oral Daily    aspirin  81 mg Oral BID    ceFAZolin  2 g Intravenous Q8H    chlorthalidone  12.5 mg Oral Daily    [START ON 11/7/2023] lisinopril  10 mg Oral Daily    memantine  10 mg Oral BID    [START ON 11/7/2023] polyethylene glycol  17 g Oral Daily    potassium chloride ER  20 mEq Oral BID    senna-docusate  1 tablet Oral BID    sertraline  200 mg Oral Daily    simvastatin  40 mg Oral At Bedtime    sodium chloride (PF)  3 mL Intracatheter Q8H    traZODone  100 mg Oral At Bedtime       Data   Recent Labs   Lab 11/06/23  0910   *     Lactic Acid   Date Value Ref Range Status   03/03/2023 0.9 0.7 - 2.0 mmol/L Final       Recent Results (from the past 24 hour(s))   XR Surgery UGO Fluoro L/T 5 Min w Stills    Narrative    PROCEDURE: XR SURGERY  UGO FLUORO LESS THAN 5 MIN W STILLS 11/6/2023  1:23 PM    HISTORY: Left Direct Anterior Total Hip Arthroplasty    Fluoroscopy service was provided for left hip prosthesis placement.  Fluoroscopy time was 0.3 minutes    ELSIE ANAYA MD         SYSTEM ID:  [id number]   XR Pelvis Port 1/2 Views    Narrative    XR PELVIS PORT 1/2 VIEWS    HISTORY: Status post Hip surgery .    COMPARISON: 7/12/2023.    TECHNIQUE: AP pelvis.    FINDINGS:    Postoperative changes of interval left hip arthroplasty are seen. No  periprosthetic fracture is identified. Subcutaneous air is noted. No  unexpected retained foreign body is seen.        Impression    IMPRESSION:     Interval left hip arthroplasty.      MARIZOL GOLD MD         SYSTEM ID:  [id number]       Rudy Kitchen MD

## 2023-11-06 NOTE — PROGRESS NOTES
Care Transitions focused note:      Met with Dee Dee and sisters, Milagro and Maru.  Maru expressed her concerns about Dee Dee's ability to return to [facility] at discharge.  Writer advised that at this point it is too soon to know how Dee Dee will do post operatively.  Writer did explain that [facility]'s [facility] and [facility] typically require a prior authorization in order to cover short term rehab.  Maru verbalized that she had been in touch with [facility] and that part wasn't explained to her, just that Dee Dee would have coverage at an in network facility.  Family aware locally those are [facility], [facility] and [facility] [place].  They were fine with all options.      Writer did also discuss with Ludy MAHER. She indicates that Dee Dee would need to be able to bear weight.  She indicates that Dee Dee was independent outside of medication management, meal prep and housekeeping tasks.          Pt/family was provided with the Medicare Compare list for SNF.  Discussed associated Medicare star ratings to assist with choice for referrals/discharge planning Yes    Education was given to pt/family that star ratings are updated/maintained by Medicare and can be reviewed by visiting www.medicare.gov Yes    Patient/family choices for facility in priority are:   First Choice : Skilled Nursing Facility [place]: [facility]    [phone]    Second Choice: Skilled Nursing Facility [place]: [facility]      [phone]    Third Choice: Skilled Nursing Facility [place]: [facility]       [phone]

## 2023-11-06 NOTE — ANESTHESIA CARE TRANSFER NOTE
Patient: Dee Dee Shepherd    Procedure: Procedure(s):  Left Direct Anterior Total Hip Arthroplasty       Diagnosis: Primary osteoarthritis of left hip [M16.12]  Diagnosis Additional Information: No value filed.    Anesthesia Type:   Spinal     Note:    Oropharynx: oropharynx clear of all foreign objects  Level of Consciousness: drowsy  Oxygen Supplementation: room air    Independent Airway: airway patency satisfactory and stable  Dentition: dentition unchanged  Vital Signs Stable: post-procedure vital signs reviewed and stable  Report to RN Given: handoff report given  Patient transferred to: PACU    Handoff Report: Identifed the Patient, Identified the Reponsible Provider, Reviewed the pertinent medical history, Discussed the surgical course, Reviewed Intra-OP anesthesia mangement and issues during anesthesia, Set expectations for post-procedure period and Allowed opportunity for questions and acknowledgement of understanding    Vitals:  Vitals Value Taken Time   /61 11/06/23 1259   Temp     Pulse 72 11/06/23 1300   Resp 17 11/06/23 1300   SpO2 97 % 11/06/23 1300   Vitals shown include unfiled device data.    Electronically Signed By: DELORES Starkey CRNA  November 6, 2023  1:01 PM

## 2023-11-06 NOTE — ANESTHESIA PROCEDURE NOTES
PENG Procedure Note    Pre-Procedure   Staff -        Resident/Fellow: Marcus Killian       CRNA: Tanesha Olivia APRN CRNA       Performed By: CRNA and ZIGGY       Location: pre-op       Procedure Start/Stop Times: 11/6/2023 10:15 AM and 11/6/2023 10:20 AM       Pre-Anesthestic Checklist: patient identified, IV checked, site marked, risks and benefits discussed, informed consent, monitors and equipment checked, pre-op evaluation, at physician/surgeon's request and post-op pain management  Timeout:       Correct Patient: Yes        Correct Procedure: Yes        Correct Site: Yes        Correct Position: Yes        Correct Laterality: Yes        Site Marked: Yes  Procedure Documentation  Procedure: PENG       Diagnosis: LEFT DIRECT ANTERIOR TOTAL HIP ARTHROPLASTY (LEFT: HIP       Laterality: left       Patient Position: supine       Skin prep: Chloraprep       Needle Type: insulated and short bevel       Needle Gauge: 20.        Needle Length (Inches): 4                 Ultrasound guided       1. Ultrasound was used to identify targeted nerve, plexus, vascular marker, or fascial plane and place a needle adjacent to it in real-time.       2. Ultrasound was used to visualize the spread of anesthetic in close proximity to the above referenced structure.       3. A permanent image is entered into the patient's record.       4. The visualized anatomic structures appeared normal.       5. There were no apparent abnormal pathologic findings.    Assessment/Narrative         The placement was negative for: blood aspirated, painful injection and site bleeding       Paresthesias: No.       Bolus given via needle. no blood aspirated via catheter.        Secured via.        Insertion/Infusion Method: Single Shot       Complications: none       Injection made incrementally with aspirations every 5 mL.    Medication(s) Administered   Bupivacaine 0.25% PF (Infiltration) - Infiltration   20 mL - 11/6/2023 10:20:00 AM  Dexamethasone  "10 mg/mL PF (Perineural) - Perineural   6 mg - 11/6/2023 10:20:00 AM  Medication Administration Time: 11/6/2023 10:15 AM      FOR [facility]) ONLY:   Pain Team Contact information: please page the Pain Team Via [employer]. Search \"Pain\". During daytime hours, please page the attending first. At night please page the resident first.      "

## 2023-11-06 NOTE — INTERVAL H&P NOTE
"I have reviewed the surgical (or preoperative) H&P that is linked to this encounter, and examined the patient. There are no significant changes    Clinical Conditions Present on Arrival:  Clinically Significant Risk Factors Present on Admission                  # Overweight: Estimated body mass index is 28.87 kg/m  as calculated from the following:    Height as of this encounter: 1.6 m (5' 3\").    Weight as of this encounter: 73.9 kg (163 lb).       "

## 2023-11-06 NOTE — PLAN OF CARE
[facility] Inpatient Admission Note:    Patient admitted to 3216/3216-1 at approximately 1345 via bed accompanied by nurse from surgery . Report received from NIKA Clarke in SBAR format at 1345 via face to face in room. Patient is alert and oriented X 3, denies pain; rates at 0 on 0-10 scale.  Patient oriented to room, unit, hourly rounding, and plan of care. Explained admission packet and patient handbook with patient bill of rights brochure. Will continue to monitor and document as needed.     Inpatient Nursing criteria listed below was met:    Health care directives status obtained and documented: Yes    Patient identifies a surrogate decision maker: Yes If yes, who: Arely Boyd (sister) Contact Information: [phone]     If initial lactic acid greater than 2.0, repeat lactic acid drawn within one hour of arrival to unit: NA.     Clergy visit ordered if patient requests: N/A    Skin issues/needs documented: Yes    Isolation Patient: no     Fall Prevention Yes: Care plan updated, education given and documented, sticker and magnet in place: Yes    Care Plan initiated: Yes    Education Documented (including assessment): Yes    Patient has discharge needs : Yes If yes, please explain:STR

## 2023-11-06 NOTE — ANESTHESIA PROCEDURE NOTES
"Intrathecal injection Procedure Note    Pre-Procedure   Staff -        Resident/Fellow: Marcus Killian       CRNA: Tanesha Olivia APRN CRNA       Performed By: CRNA and ZIGGY       Location: OR       Procedure Start/Stop Times: 11/6/2023 11:09 AM and 11/6/2023 11:19 AM       Pre-Anesthestic Checklist: patient identified, IV checked, risks and benefits discussed, informed consent, monitors and equipment checked, pre-op evaluation, at physician/surgeon's request and post-op pain management  Timeout:       Correct Patient: Yes        Correct Procedure: Yes        Correct Site: Yes        Correct Position: Yes   Procedure Documentation  Procedure: intrathecal injection       Patient Position: sitting       Skin prep: Betadine       Insertion Site: L4-5. (midline approach).       Needle Gauge: 25.        Needle Length (Inches): 3.5        Spinal Needle Type: Renae tip       Introducer used       # of attempts: 3 and  # of redirects:  3    Assessment/Narrative         Paresthesias: No.       CSF fluid: clear.    Medication(s) Administered   Medication Administration Time: 11/6/2023 11:09 AM     Comments:  Unable to get through bone higher than L4. One inch right of midline at L4-5 per Tanesha Olivia was able to get through. Patient tolerated well. No complications at this time.       FOR [facility] ([facility]) ONLY:   Pain Team Contact information: please page the Pain Team Via [email or web address]. Search \"Pain\". During daytime hours, please page the attending first. At night please page the resident first.      "

## 2023-11-06 NOTE — OP NOTE
Preoperative Diagnosis: Left Hip Osteoarthritis    Postoperative Diagnosis: Left Hip Osteoarthritis    Procedure: Left Direct Anterior Total Hip Arthroplasty    Surgeon: Vernon Miles MD    Assistants: Brianda Becerra DNP    A skilled first assistant was required for patient positioning, retracting, and carrying out the procedure in a safe and effective manner    Anesthesia:   1) Spinal with Sedation  2) Local Injection around surgical site    Findings:    1) Satisfactory SREE with near equal restoration of leg length and offset postoperatively    2) Adequate hemostasis     Implants:    1) Smith [employer] Nephew Polar Femoral Stem Size 1 Standard Offset   2) Size 50 mm R3 Acetabular Shell   3) Size 36 mm standard poly acetabular insert   4) Size 36 mm 0 Oxinium Femoral Head      Estimated Blood Loss: 250 ml    Specimens: None    Drains: None    Complications: None    Indications:   This is a 68 year old patient with long standing Left hip pain and severe osteoarthritis.  They have failed nonoperative treatment including use of NSAIDs; Tylenol; injections and exercise.  Given the continued symptoms they wished to proceed with a hip replacement.  The risks including pain, bleeding, infection, deep vein thrombosis, pulmonary embolism, myocardial infarction, component failure, need for revision operation was discussed with the patient.  The surgical consent was signed and surgical site was marked.  All questions regarding postoperative disposition were answered.      Description of Procedure:   The patient was taken to the operating room and placed under spinal anesthesia.  They were then moved to the [place] bed and positioned in standard fashion.  The C-arm was used to make a templating radiograph for post reconstruction comparison.  The Left hip was prepped with a Chloraprep wipe and sponge then draped in sterile fashion.  A timeout was called to identify the correct patient and surgical site.  An anterolateral incision and direct  anterior approach to the hip was utilized.  Care was taken to cauterize the circumflex vessels.  A capsulotomy was completed and tag sutures were placed.  The femoral neck was exposed and an osteotomy was completed.  A secondary cut was made to make removing the femoral head/neck easier.  The hip was externally rotated and the labrum excised.  Further release on the proximal femur was completed to improve visualization of the proximal femur later in the case.  The C-arm was used during reaming the acetabulum to check alignment and depth of reaming.  The acetabulum was reamed to a size 50 mm and the acetabular shell was placed.  A 6.5 mm bone screw 25 mm in length was placed along with a hole eliminator.  The final 36 mm poly insert was placed and checked for a secure fit.  The traction was released and standard capsular releases were completed on the proximal femur.  The leg was externally rotated 120 degrees and brought into adduction and extension.  Standard retractor placement was utilized.  The femur was prepared with a box osteotome, canal finder and broaching up to a size 1.  The trial implants were placed and the hip was reduced.  C-arm was used to compare to preoperative leg length and offset.  The final size 1 Femoral stem was placed with a size 36 0 Oxinium femoral head and the hip was reduced.  Final C-arm images were used to confirm positioning.  No fractures were identified.  Pulse lavage and betadine irrigation was used.  The capsule was closed with #1 Vicryl sutures.  The tensor fascia was closed with a #1 Vicryl suture in running fashion.  The subcutaneous tissue was closed with a 2-0 Vicryl and staples. An Aquacel dressing was applied.  The patient was awakened and transferred to PACU in stable condition.  A post operative x-ray was taken in PACU.        Postoperative Plan:   Routine SREE protocol (Weightbearing as tolerated, No hip ROM precautions, PT, Pain control, DVT prophylaxis with Aspirin).   Anticipate discharge in 1-2 days.  Follow-up in  2 weeks in [facility].  X-rays at follow-up AP and Cross Table Lateral Left Hip.

## 2023-11-06 NOTE — PLAN OF CARE
Goal Outcome Evaluation:       Pt is A&O, makes needs known. VSS, afebrile. PRN oxycodone given x1 On RA, O2 sats high 90's. LS clear. HRR. BS active. Tolerated regular diet for dinner. Dressing to left hip site CDI. Up to chair with assist of 2, gait belt, walker. Due to void. IVF 75 mL/hr. Call light within reach.    Patient vital signs are at baseline: Yes  Patient able to ambulate as they were prior to admission or with assist devices provided by therapies during their stay:  Yes  Patient MUST void prior to discharge:  Yes  Patient able to tolerate oral intake:  Yes  Pain has adequate pain control using Oral analgesics:  Yes  Does patient have an identified :  Yes  Has goal D/C date and time been discussed with patient:  No,  Reason:  TBD      Face to face report given with opportunity to observe patient.    Report given to NIKA Patel RN   11/6/2023  7:33 PM

## 2023-11-06 NOTE — OR NURSING
Patient awake and alert.   No pain.    Tolerating ice chips.    Patient transferred to room 3216 on acute care.  Handoff report to Tammie MEJIA RN.

## 2023-11-06 NOTE — ANESTHESIA POSTPROCEDURE EVALUATION
Patient: Dee Dee Shepherd    Procedure: Procedure(s):  Left Direct Anterior Total Hip Arthroplasty       Anesthesia Type:  Spinal    Note:  Disposition: Admission   Postop Pain Control: Uneventful            Sign Out: Well controlled pain   PONV: No   Neuro/Psych: Uneventful            Sign Out: Acceptable/Baseline neuro status   Airway/Respiratory: Uneventful            Sign Out: Acceptable/Baseline resp. status   CV/Hemodynamics: Uneventful            Sign Out: Acceptable CV status; No obvious hypovolemia; No obvious fluid overload   Other NRE: NONE   DID A NON-ROUTINE EVENT OCCUR? No         Last vitals:  Vitals Value Taken Time   /95 11/06/23 1330   Temp 97.5  F (36.4  C) 11/06/23 1330   Pulse 57 11/06/23 1333   Resp 17 11/06/23 1334   SpO2 99 % 11/06/23 1334   Vitals shown include unfiled device data.    Electronically Signed By: DELORES Starkey CRNA  November 6, 2023  3:33 PM

## 2023-11-07 ENCOUNTER — APPOINTMENT (OUTPATIENT)
Dept: OCCUPATIONAL THERAPY | Facility: HOSPITAL | Age: 68
End: 2023-11-07
Payer: COMMERCIAL

## 2023-11-07 ENCOUNTER — APPOINTMENT (OUTPATIENT)
Dept: PHYSICAL THERAPY | Facility: HOSPITAL | Age: 68
End: 2023-11-07
Payer: COMMERCIAL

## 2023-11-07 LAB — HGB BLD-MCNC: 10.7 G/DL (ref 11.7–15.7)

## 2023-11-07 PROCEDURE — 97110 THERAPEUTIC EXERCISES: CPT | Mod: GP

## 2023-11-07 PROCEDURE — 250N000013 HC RX MED GY IP 250 OP 250 PS 637: Performed by: INTERNAL MEDICINE

## 2023-11-07 PROCEDURE — 250N000011 HC RX IP 250 OP 636: Mod: JZ

## 2023-11-07 PROCEDURE — 36415 COLL VENOUS BLD VENIPUNCTURE: CPT

## 2023-11-07 PROCEDURE — 97165 OT EVAL LOW COMPLEX 30 MIN: CPT | Mod: GO

## 2023-11-07 PROCEDURE — 85018 HEMOGLOBIN: CPT

## 2023-11-07 PROCEDURE — 258N000003 HC RX IP 258 OP 636

## 2023-11-07 PROCEDURE — 99231 SBSQ HOSP IP/OBS SF/LOW 25: CPT | Performed by: INTERNAL MEDICINE

## 2023-11-07 PROCEDURE — 250N000013 HC RX MED GY IP 250 OP 250 PS 637

## 2023-11-07 PROCEDURE — 97161 PT EVAL LOW COMPLEX 20 MIN: CPT | Mod: GP

## 2023-11-07 RX ORDER — DOCUSATE SODIUM AND SENNOSIDES 8.6; 5 MG/1; MG/1
1 TABLET, FILM COATED ORAL 2 TIMES DAILY
COMMUNITY
Start: 2023-10-17

## 2023-11-07 RX ORDER — ARIPIPRAZOLE 10 MG/1
20 TABLET ORAL DAILY
Status: DISCONTINUED | OUTPATIENT
Start: 2023-11-07 | End: 2023-11-08

## 2023-11-07 RX ORDER — NALTREXONE HYDROCHLORIDE 50 MG/1
50 TABLET, FILM COATED ORAL
Status: DISCONTINUED | OUTPATIENT
Start: 2023-11-07 | End: 2023-11-10 | Stop reason: HOSPADM

## 2023-11-07 RX ADMIN — CHLORTHALIDONE 12.5 MG: 25 TABLET ORAL at 09:01

## 2023-11-07 RX ADMIN — MEMANTINE 10 MG: 10 TABLET ORAL at 16:57

## 2023-11-07 RX ADMIN — SENNOSIDES AND DOCUSATE SODIUM 1 TABLET: 50; 8.6 TABLET ORAL at 20:45

## 2023-11-07 RX ADMIN — MEMANTINE 10 MG: 10 TABLET ORAL at 09:03

## 2023-11-07 RX ADMIN — ASPIRIN 81 MG: 81 TABLET, COATED ORAL at 20:45

## 2023-11-07 RX ADMIN — SENNOSIDES AND DOCUSATE SODIUM 1 TABLET: 50; 8.6 TABLET ORAL at 09:01

## 2023-11-07 RX ADMIN — POLYETHYLENE GLYCOL 3350 17 G: 17 POWDER, FOR SOLUTION ORAL at 09:04

## 2023-11-07 RX ADMIN — ARIPIPRAZOLE 20 MG: 10 TABLET ORAL at 11:27

## 2023-11-07 RX ADMIN — POTASSIUM CHLORIDE 20 MEQ: 1500 TABLET, EXTENDED RELEASE ORAL at 09:01

## 2023-11-07 RX ADMIN — ACETAMINOPHEN 975 MG: 325 TABLET, FILM COATED ORAL at 05:38

## 2023-11-07 RX ADMIN — ARIPIPRAZOLE 5 MG: 5 TABLET ORAL at 09:04

## 2023-11-07 RX ADMIN — LISINOPRIL 10 MG: 10 TABLET ORAL at 09:02

## 2023-11-07 RX ADMIN — ASPIRIN 81 MG: 81 TABLET, COATED ORAL at 09:03

## 2023-11-07 RX ADMIN — SIMVASTATIN 40 MG: 40 TABLET, FILM COATED ORAL at 20:45

## 2023-11-07 RX ADMIN — CEFAZOLIN SODIUM 2 G: 2 INJECTION, SOLUTION INTRAVENOUS at 03:17

## 2023-11-07 RX ADMIN — SODIUM CHLORIDE, POTASSIUM CHLORIDE, SODIUM LACTATE AND CALCIUM CHLORIDE: 600; 310; 30; 20 INJECTION, SOLUTION INTRAVENOUS at 00:45

## 2023-11-07 RX ADMIN — OXYCODONE HYDROCHLORIDE 10 MG: 5 TABLET ORAL at 07:38

## 2023-11-07 RX ADMIN — ACETAMINOPHEN 975 MG: 325 TABLET, FILM COATED ORAL at 14:05

## 2023-11-07 RX ADMIN — SERTRALINE HYDROCHLORIDE 200 MG: 50 TABLET ORAL at 09:05

## 2023-11-07 RX ADMIN — TRAZODONE HYDROCHLORIDE 100 MG: 50 TABLET ORAL at 20:45

## 2023-11-07 RX ADMIN — HYDROMORPHONE HYDROCHLORIDE 0.2 MG: 0.2 INJECTION, SOLUTION INTRAMUSCULAR; INTRAVENOUS; SUBCUTANEOUS at 00:42

## 2023-11-07 RX ADMIN — OXYCODONE HYDROCHLORIDE 10 MG: 5 TABLET ORAL at 17:57

## 2023-11-07 RX ADMIN — POTASSIUM CHLORIDE 20 MEQ: 1500 TABLET, EXTENDED RELEASE ORAL at 20:45

## 2023-11-07 ASSESSMENT — ACTIVITIES OF DAILY LIVING (ADL)
ADLS_ACUITY_SCORE: 24
ADLS_ACUITY_SCORE: 24
ADLS_ACUITY_SCORE: 25
ADLS_ACUITY_SCORE: 24
ADLS_ACUITY_SCORE: 25
ADLS_ACUITY_SCORE: 25
ADLS_ACUITY_SCORE: 24
ADLS_ACUITY_SCORE: 25
ADLS_ACUITY_SCORE: 24

## 2023-11-07 NOTE — PLAN OF CARE
"/52 (BP Location: Right arm)   Pulse 85   Temp 99.2  F (37.3  C) (Tympanic)   Resp 16   Ht 1.6 m (5' 3\")   Wt 73.9 kg (163 lb)   SpO2 95%   BMI 28.87 kg/m      A&O, 99.2 highest temp this shift, does c/o pain to L hip 8/10 did give scheduled tylenol, PRN oxy 10mg, and dilaudid x2 this shift with some relief ice in place, dressing pin point drainage, IV now SL, was receiving LR at 75ml/hr, lungs clear, remains on RA, bladder scan 557, upto commode assist 1/sba with gait belt/walker, no BM, free from falls, uses call light appropriately, makes needs known.    Face to face report given with opportunity to observe patient.    Report given to  NIKA Hardy RN   11/7/2023  7:13 AM    "

## 2023-11-07 NOTE — PROGRESS NOTES
11/07/23 1300   Appointment Info   Signing Clinician's Name / Credentials (PT) Viviane Hagan DPT   Living Environment   People in Home alone   Current Living Arrangements assisted living   Home Accessibility no concerns   Number of Stairs, Within Home, Primary none   Transportation Anticipated public transportation;health plan transportation   Living Environment Comments Pt resides at [facility]. Uses no assistive device at baseline   Self-Care   Usual Activity Tolerance moderate   Current Activity Tolerance fair   Equipment Currently Used at Home cane, straight;grab bar, tub/shower;grab bar, toilet;raised toilet seat   Fall history within last six months yes   Number of times patient has fallen within last six months 1   General Information   Onset of Illness/Injury or Date of Surgery 11/06/23   Referring Physician Brianda Becerra APRN CNP   Patient/Family Therapy Goals Statement (PT) Did not report   Pertinent History of Current Problem (include personal factors and/or comorbidities that impact the POC) Pt hospitalized s/p L SREE   Existing Precautions/Restrictions no known precautions/restrictions   Weight-Bearing Status - LLE weight-bearing as tolerated   Weight-Bearing Status - RLE weight-bearing as tolerated   Cognition   Affect/Mental Status (Cognition) WFL   Orientation Status (Cognition) oriented to;person;place;situation   Safety Deficit (Cognition) awareness of need for assistance;at risk behavior observed;insight into deficits/self-awareness;judgment   Pain Assessment   Patient Currently in Pain Yes, see Vital Sign flowsheet   Posture    Posture Protracted shoulders   Range of Motion (ROM)   Range of Motion ROM deficits secondary to surgical procedure   Strength (Manual Muscle Testing)   Strength (Manual Muscle Testing) Deficits observed during functional mobility   Transfers   Transfers sit-stand transfer   Transfer Safety Concerns Noted decreased balance during turns   Impairments Contributing  to Impaired Transfers impaired balance;pain;decreased strength   Sit-Stand Transfer   Sit-Stand [place] (Transfers) contact guard   Assistive Device (Sit-Stand Transfers) walker, front-wheeled   Gait/Stairs (Locomotion)   [place] Level (Gait) contact guard;minimum assist (75% patient effort)   Assistive Device (Gait) walker, front-wheeled   Distance in Feet (Gait) 100'   Deviations/Abnormal Patterns (Gait) antalgic;gait speed decreased;sarai decreased   Sensory Examination   Sensory Perception patient reports no sensory changes   Coordination   Coordination no deficits were identified   Muscle Tone   Muscle Tone no deficits were identified   Clinical Impression   Criteria for Skilled Therapeutic Intervention Yes, treatment indicated   PT Diagnosis (PT) s/p L SREE   Influenced by the following impairments Impaired gait, impaired balance, weakness, decreased insight to impairments   Functional limitations due to impairments Decreased tolerance for functional mobility, increased risk for falls   Clinical Presentation (PT Evaluation Complexity) stable   Clinical Presentation Rationale Clinical judgement   Clinical Decision Making (Complexity) low complexity   Planned Therapy Interventions (PT) balance training;bed mobility training;gait training;home exercise program;neuromuscular re-education;patient/family education;ROM (range of motion);strengthening;stretching;transfer training;progressive activity/exercise;home program guidelines   Risk & Benefits of therapy have been explained evaluation/treatment results reviewed;care plan/treatment goals reviewed;participants included;patient   Clinical Impression Comments PT evaluation completed s/p L SREE. Based on pt's current function she would not be safe to d/c home independently at this time. Recommending short term rehab stay. Plan to treat pt during her stay and monitor progress   PT Total Evaluation Time   PT Kirstie Low Complexity Minutes (86825) 15   Physical  Therapy Goals   PT Frequency 6x/week   PT Predicted Duration/Target Date for Goal Attainment 11/14/23   PT Goals Bed Mobility;Transfers;Gait   PT: Bed Mobility Independent   PT: Transfers Modified independent;Assistive device   PT: Gait Supervision/stand-by assist;Rolling walker;Greater than 200 feet   Interventions   Interventions Quick Adds Therapeutic Procedure   Therapeutic Procedure/Exercise   Ther. Procedure: strength, endurance, ROM, flexibillity Minutes (89584) 10   Symptoms Noted During/After Treatment increased pain   Treatment Detail/Skilled Intervention Seated BLE ther ex including ankle pumps, glute sets 3 sec holds, LAQ, quad sets, isometric adduction 5 sec holds x 10-15 each. Cues for form with exercises   PT Discharge Planning   PT Plan Progress mobility as tolerated   PT Discharge Recommendation (DC Rec) Transitional Care Facility   PT Rationale for DC Rec Based on pt's current function she would not be safe to d/c home independently at this time. Recommending short term rehab stay. Plan to treat pt during her stay and monitor progress   PT Brief overview of current status min A/CGA for transfers c FWW, 100' c FWW   Total Session Time   Timed Code Treatment Minutes 10   Total Session Time (sum of timed and untimed services) 25

## 2023-11-07 NOTE — PROGRESS NOTES
PAS-RR    Per DHS regulation, CTS team completed and submitted PAS-RR to [place] [employer] Direct Connect via the [employer]. CTS team advised SNF and they are aware a PAS-RR has been submitted.     CTS team reviewed with pt or health care agent that they may be contacted for a follow up appointment within 10 days of hospital discharge if SNF stay is less than 30 days. Contact information for [employer] was also provided.     Pt or health care agent verbalized understanding.     PAS-RR # [id number]    Did trigger for an OBRA Level II screening for admission to skilled nursing facility.

## 2023-11-07 NOTE — PLAN OF CARE
Patient is alert and oriented x3; stated pain at 3 out of 10 on pain scale and received oxyCODONE tab 10mg at 0738 ; using cold compression to help pain; Patient oriented to room in chair; PT walked patient and she tolerated it well with gait belt and walker; lungs are clear; active bowels; uses call light appropriately. Patient is ready for discharge upon finding snf referrals     /46 Alerted Nurse on duty Pulse 72 Temp 97.5 F (36.4 C) Tympanic   Resp 16 Ht 1.6 (5'3) Wt 73.9kg (163 lb) SpO2 98  BMI 28.87     Report given to Hayley Amador   Nursing Student   11/7/2023  2061

## 2023-11-07 NOTE — MEDICATION SCRIBE - ADMISSION MEDICATION HISTORY
Medication Scribe Admission Medication History    Admission medication history is complete. The information provided in this note is only as accurate as the sources available at the time of the update.    Information Source(s): Facility (U/NH/) medication list/MAR and CareEverywhere/SureScripts via N/A    Pertinent Information:   Patient resides at [facility] and staff manages medications.     Changes made to PTA medication list:  Added: senna-s  Deleted: extra strength senna  Changed: updated time of day pt takes medications.     Medication Affordability:  Not including over the counter (OTC) medications, was there a time in the past 3 months when you did not take your medications as prescribed because of cost?: No (Pt resides in [place])    Allergies reviewed with patient and updates made in EHR: no    Medication History Completed By: Rosio Wood 11/7/2023 9:01 AM    PTA Med List   Medication Sig Note Last Dose    acetaminophen (TYLENOL) 325 MG tablet Take 2 tablets (650 mg) by mouth every 4 hours as needed for other (mild pain)      ARIPiprazole (ABILIFY) 20 MG tablet Take 20 mg by mouth daily (with lunch) -take with a 5 mg tablet for a total daily dose of 25 mg.  11/5/2023 at 1200    ARIPiprazole (ABILIFY) 5 MG tablet Take 5 mg by mouth daily (with lunch) -take with a 20 mg tablet for a total daily dose of 25 mg.  11/5/2023 at 1200    aspirin 81 MG EC tablet Take 1 tablet (81 mg) by mouth 2 times daily      chlorthalidone (HYGROTON) 25 MG tablet Take 12.5 mg by mouth daily (with lunch)  11/5/2023 at 1200    lisinopril (ZESTRIL) 10 MG tablet Take 10 mg by mouth daily (with lunch) (Noon)  11/6/2023 at 0730 (before surgery)    memantine (NAMENDA) 10 MG tablet Take 1 tablet by mouth 2 times daily (Noon and 8:00 PM)  11/5/2023 at 2000    naltrexone (DEPADE/REVIA) 50 MG tablet Take 1 tablet by mouth daily (with lunch) 11/7/2023: HELD SINCE 11/2/23 11/2/2023 at [facility] for surgery    oxyCODONE (ROXICODONE) 5 MG  tablet Take 1-2 tablets (5-10 mg) by mouth every 4 hours as needed for moderate to severe pain      potassium chloride ER (KLOR-CON) 20 MEQ CR tablet Take 20 mEq by mouth 2 times daily (with meals)  11/5/2023 at 1700    sertraline (ZOLOFT) 100 MG tablet Take 200 mg by mouth daily (with lunch)  11/5/2023 at 1200    simvastatin (ZOCOR) 40 MG tablet Take 40 mg by mouth at bedtime  11/5/2023 at 2000    STOOL SOFTENER/LAXATIVE 50-8.6 MG tablet Take 1 tablet by mouth 2 times daily  11/5/2023 at 2000    traZODone (DESYREL) 100 MG tablet Take 100 mg by mouth At Bedtime  11/5/2023 at 2000

## 2023-11-07 NOTE — PROGRESS NOTES
Subjective: The patient is POD #1 s/p Left Total Hip Arthroplasty.  The patients pain is controlled fairly well this AM.  They deny shortness of breath, chest pain, nausea or vomiting.  There have been no acute perioperative complications    Objective:   B/P: 138/46, Temp: 98.8, Pulse: 94, Resp: 18    Alert and Orientated x3; No Acute Distress; Appears comfortable     Hip Exam: dressing/wound is clean, dry, intact without significant drainage.  No erythema or signs of infection.     No evidence of DVT    Distal motor/sensory exam is intact in the superficial peroneal, deep peroneal and tibial nerve distributions.      Normal capillary refill with a palpable dorsalis pedis pulse.    Hemoglobin   Date Value Ref Range Status   11/07/2023 10.7 (L) 11.7 - 15.7 g/dL Final   03/26/2020 15.1 11.7 - 15.7 g/dL Final       Assessment/Plan:     1) POD # 1 S/P Left Total Hip Arthroplasty  -Pain controlled  -PT/OT--- Strengthening and Gait training  -WBAT; No Hip ROM Precautions  -DVT prophylaxis  -Dispo--Probably to SNF for safety/fall risk; PT to evaluate   -Follow-Up in [facility] in 2 weeks  -Hospitalist co-management

## 2023-11-07 NOTE — DISCHARGE INSTRUCTIONS
Appointments: * You have a Hospital Follow-up appointment scheduled for November 21st at 11:30 AM at [facility] If you need to reschedule please call [phone]

## 2023-11-07 NOTE — PROGRESS NOTES
"[facility]    Hospitalist Progress Note    Date of Service (when I saw the patient): 11/07/2023    Assessment & Plan   Dee Dee Shepherd is a 68 year old female who was admitted on 11/6/2023.     Status post elective left direct anterior hip total arthroplasty postoperative day #1:   -Postop cares and protocol per Ortho    Essential hypertension: BP and heart rate within normal limits and stable  -Continue chlorthalidone, lisinopril    Chronic kidney disease: Pre-op creatinine 0.81  -Monitor renal function and urine output    Depression: Continue Abilify, Zoloft, trazodone    History of alcohol abuse: Patient is on naltrexone as an outpatient    FEN: Oral diet as tolerated    Clinically Significant Risk Factors Present on Admission                  # Hypertension: Noted on problem list      # Overweight: Estimated body mass index is 28.87 kg/m  as calculated from the following:    Height as of this encounter: 1.6 m (5' 3\").    Weight as of this encounter: 73.9 kg (163 lb).              DVT Prophylaxis: Defer to primary service    Code Status: Full Code    Disposition: Possible SNF placement    Rudy Kitchen MD, MD        Interval History   Patient seen in room.  She states that her nurse was fairly restless.  Abdomen pain controlled but definitely present.  Patient denies chest pain, shortness of breath, abdominal pain, nausea.    -Data reviewed today: I reviewed all new labs and imaging results over the last 24 hours. I personally reviewed imaging reports.    Physical Exam   Temp: 98.8  F (37.1  C) Temp src: Tympanic BP: (!) 138/46 Pulse: 94   Resp: 18 SpO2: 96 % O2 Device: None (Room air)    Vitals:    11/06/23 0846   Weight: 73.9 kg (163 lb)     Vital Signs with Ranges  Temp:  [96.8  F (36  C)-99.2  F (37.3  C)] 98.8  F (37.1  C)  Pulse:  [55-94] 94  Resp:  [12-20] 18  BP: ()/(46-95) 138/46  SpO2:  [95 %-99 %] 96 %      Intake/Output Summary (Last 24 hours) at 11/7/2023 0826  Last data filed " at 11/7/2023 0800  Gross per 24 hour   Intake 2059 ml   Output 750 ml   Net 1309 ml         Peripheral IV 11/06/23 Right Hand (Active)   Site Assessment WDL 11/07/23 0740   Line Status Saline locked 11/07/23 0740   Dressing Transparent 11/07/23 0319   Dressing Status clean;dry;intact 11/07/23 0319   Line Intervention Flushed 11/06/23 2028   Phlebitis Scale 0-->no symptoms 11/07/23 0740   Infiltration? no 11/07/23 0740   Number of days: 1       Incision/Surgical Site 06/16/22 Right Hip (Active)   Number of days: 509       Incision/Surgical Site 11/06/23 Left Hip (Active)   Incision Assessment UTV 11/07/23 0319   Carissa-Incision Assessment UTV 11/07/23 0319   Closure SHAHNAZ 11/07/23 0319   Incision Drainage Amount UTV 11/07/23 0319   Drainage Description UTV 11/07/23 0319   Incision Care Ice applied 11/07/23 0319   Dressing Intervention Dressing marked - No change 11/07/23 0740   Number of days: 1     No line/device    Constitutional - AA, NAD  HEENT - atraumatic, normocephalic  Neck - supple, no masses, no JVD  CVS - S1 S2 RRR, no murmurs, rubs, gallops  Respiratory - CTA b/l  GI - soft, NT, ND, + bowel sounds, no organomegaly  Musculoskeletal - no LE edema, no lesions  Neuro - oriented x 3, no gross focal deficits      Medications    lactated ringers Stopped (11/07/23 0353)      acetaminophen  975 mg Oral Q8H    ARIPiprazole  5 mg Oral Daily    aspirin  81 mg Oral BID    chlorthalidone  12.5 mg Oral Daily    lisinopril  10 mg Oral Daily    memantine  10 mg Oral BID    polyethylene glycol  17 g Oral Daily    potassium chloride ER  20 mEq Oral BID    senna-docusate  1 tablet Oral BID    sertraline  200 mg Oral Daily    simvastatin  40 mg Oral At Bedtime    sodium chloride (PF)  3 mL Intracatheter Q8H    traZODone  100 mg Oral At Bedtime       Data   Recent Labs   Lab 11/07/23  0533 11/06/23  0910   HGB 10.7*  --    GLC  --  132*     Lactic Acid   Date Value Ref Range Status   03/03/2023 0.9 0.7 - 2.0 mmol/L Final        Recent Results (from the past 24 hour(s))   XR Surgery UGO Fluoro L/T 5 Min w Stills    Narrative    PROCEDURE: XR SURGERY UGO FLUORO LESS THAN 5 MIN W STILLS 11/6/2023  1:23 PM    HISTORY: Left Direct Anterior Total Hip Arthroplasty    Fluoroscopy service was provided for left hip prosthesis placement.  Fluoroscopy time was 0.3 minutes    ELSIE ANAYA MD         SYSTEM ID:  [id number]   XR Pelvis Port 1/2 Views    Narrative    XR PELVIS PORT 1/2 VIEWS    HISTORY: Status post Hip surgery .    COMPARISON: 7/12/2023.    TECHNIQUE: AP pelvis.    FINDINGS:    Postoperative changes of interval left hip arthroplasty are seen. No  periprosthetic fracture is identified. Subcutaneous air is noted. No  unexpected retained foreign body is seen.        Impression    IMPRESSION:     Interval left hip arthroplasty.      MARIZOL GOLD MD         SYSTEM ID:  [id number]       Rudy Kitchen MD

## 2023-11-07 NOTE — PLAN OF CARE
Pt up in chair this AM. Ambulated with PT and once with nurse staff. Pt received scheduled tylenol, prn oxy, and ice to incision site. Pt states pain has been well managed this shift. Pt has been visiting with family this shift. Pt declined breakfast and lunch. States no real appetite. At home usual snacks. Pt encouraged to drink fluids and eat for nutrition. Pt makes needs known approp. Awaiting rehab for placement.     Face to face report given with opportunity to observe patient.    Report given to Cece Chau RN   11/7/2023  2:59 PM

## 2023-11-07 NOTE — PROGRESS NOTES
11/07/23 1100   Appointment Info   Signing Clinician's Name / Credentials (OT) Cece Vernon, OTR/L   Quick Adds   Quick Adds Certification   Living Environment   People in Home alone   Current Living Arrangements   ([facility] care facility)   Home Accessibility no concerns   Number of Stairs, Within Home, Primary none   Transportation Anticipated public transportation;health plan transportation   Living Environment Comments Patient reported that she lives alone in an apartment at [facility] where she receives assistance with meals, medications, and home maintenance. Her bathroom has a walk-in shower with grab bars, a raised toilet, and grab bars near the toilet. She has a fww and a cane at home but was not using them at baseline.   Self-Care   Usual Activity Tolerance moderate   Current Activity Tolerance fair   Equipment Currently Used at Home cane, straight;grab bar, tub/shower;grab bar, toilet;raised toilet seat  (reacher)   Fall history within last six months yes   Number of times patient has fallen within last six months 1   Activity/Exercise/Self-Care Comment Patient reported that she was independent with ADLs at home. She has a fww and a single end cane but was not using them at baseline.   Instrumental Activities of Daily Living (IADL)   IADL Comments Patient reported that she was receiving assistance with IADLs such as meal preparation, medication management, and home maintenance.   General Information   Onset of Illness/Injury or Date of Surgery 11/06/23   Referring Physician Brianda Becerra   Patient/Family Therapy Goal Statement (OT) Patient and family would like patient to go to transitional care facility for STR upon discharge.   Additional Occupational Profile Info/Pertinent History of Current Problem Patient underwent total left hip arthroplasty on 11/6/2023. WBAT.   Left Lower Extremity (Weight-bearing Status) weight-bearing as tolerated (WBAT)   Cognitive Status Examination   Orientation Status  orientation to person, place and time   Affect/Mental Status (Cognitive) WNL   Follows Commands WNL   Pain Assessment   Patient Currently in Pain Yes, see Vital Sign flowsheet  (Patient reported 6/10 pain in left hip at rest.)   Posture   Posture not impaired   Range of Motion Comprehensive   General Range of Motion bilateral upper extremity ROM WNL   Strength Comprehensive (MMT)   General Manual Muscle Testing (MMT) Assessment upper extremity strength deficits identified   Upper Extremity (Manual Muscle Testing)   Comment, MMT: Upper Extremity BUE shoulder flexion: 4-/5; elbow flexion/extension 4+/5   Bed Mobility   Bed Mobility   (Not tested as patient was seated in recliner at time of OT arrival.)   Transfers   Transfers sit-stand transfer;toilet transfer   Sit-Stand Transfer   Sit-Stand [place] (Transfers) contact guard   Assistive Device (Sit-Stand Transfers) walker, standard   Sit/Stand Transfer Comments Patient was able to stand from recliner and from commode with CGA and fww.   Toilet Transfer   Type (Toilet Transfer) stand-sit   [place] Level (Toilet Transfer) contact guard   Assistive Device (Toilet Transfer) commode chair   Toilet Transfer Comments Patient transferred on/off commode with CGA and fww.   Activities of Daily Living   BADL Assessment/Intervention lower body dressing;toileting   Lower Body Dressing Assessment/Training   Position (Lower Body Dressing) supported sitting   Assistive Devices (Lower Body Dressing) dressing stick;sock-aid   Comment, (Lower Body Dressing) Patient required max A for lower body dressing tasks such as donning/doffing socks. She was educated on use of adaptive equipment and was able to doff socks mod I with dressing stick and don sock mod I with sock aid.   [place] Level (Lower Body Dressing) doff;don;maximum assist (25% patient effort)   Toileting   Position (Toileting) supported sitting   Assistive Devices (Toileting) grab bar, toilet   Comment,  (Toileting) Patient required mod cues to position self safely in front of commode prior to transfer. She required min A for management of gown and was able to complete toilet hygiene mod I while seated.   [place] Level (Toileting) adjust/manage clothing;perform perineal hygiene;contact guard assist;minimum assist (75% patient effort)   Clinical Impression   Criteria for Skilled Therapeutic Interventions Met (OT) Yes, treatment indicated   OT Diagnosis Impaired ADLs, decreased activity tolerance   Influenced by the following impairments S/p total left hip arthroplasty   OT Problem List-Impairments impacting ADL problems related to;activity tolerance impaired;mobility;pain;strength   Assessment of Occupational Performance 1-3 Performance Deficits   Identified Performance Deficits Impaired ADLs, decreased activity tolerance   Planned Therapy Interventions (OT) ADL retraining;strengthening;progressive activity/exercise;home program guidelines;ROM   Clinical Decision Making Complexity (OT) problem focused assessment/low complexity   Risk & Benefits of therapy have been explained evaluation/treatment results reviewed;care plan/treatment goals reviewed;risks/benefits reviewed;current/potential barriers reviewed;participants voiced agreement with care plan;participants included;patient   Clinical Impression Comments Patient was previously living alone in an assisted living apartment. She was receiving assistance with IADLs such as meal preparation, medication management, and home maintenance. She was also receiving assistance with rides. She has a fww and cane at home but was not using them at baseline.   OT Total Evaluation Time   OT Eval, Low Complexity Minutes (10234) 18   Therapy Certification   Medical Diagnosis S/p total left hip arthroplasty   Start of Care Date 11/07/23   Certification date from 11/07/23   Certification date to 11/14/23   OT Goals   Therapy Frequency (OT) 5 times/week   OT Predicted  Duration/Target Date for Goal Attainment 11/14/23   OT Goals Lower Body Dressing;Transfers;Toilet Transfer/Toileting;Upper Body Bathing   OT: Lower Body Dressing Modified independent;using adaptive equipment   OT: Upper Body Bathing Supervision/stand-by assist   OT: Transfer Supervision/stand-by assist   OT: Toilet Transfer/Toileting Supervision/stand-by assist   OT Discharge Planning   OT Plan Progress ADLs, activity tolerance   OT Discharge Recommendation (DC Rec) Transitional Care Facility   OT Rationale for DC Rec Patient was previously living alone in an assisted living apartment. She was receiving assistance with IADLs such as meal preparation, medication management, and home maintenance. She was also receiving assistance with rides. She has a fww and cane at home but was not using them at baseline. During evaluation, she required assistance for lower body dressing and toileting tasks. She required CGA for transfers and ambulation with fww. Patient does not have assistance with ADLs at home. Recommend STR upon discharge.   OT Brief overview of current status CGA and fww for transfers; max A for lower body dressing without equipment; mod I for lower body dressing with adaptive equipment; CGA for toileting   OT Equipment Needed at Discharge dressing equipment;walker, standard   Total Session Time   Total Session Time (sum of timed and untimed services) 18   I certify the need for these services furnished under this plan of treatment and while under my care. (Physician co-signature of this document indicates review and certification of the therapy plan).      _____________________________     __________________________    ____________  Physician's Signature                 Date               Time

## 2023-11-07 NOTE — PROGRESS NOTES
No openings at [facility].  Referral sent to Kandy with [facility]. Dee Dee and sister, Milagro updated. Requesting [place].

## 2023-11-08 ENCOUNTER — APPOINTMENT (OUTPATIENT)
Dept: OCCUPATIONAL THERAPY | Facility: HOSPITAL | Age: 68
End: 2023-11-08
Attending: ORTHOPAEDIC SURGERY
Payer: COMMERCIAL

## 2023-11-08 ENCOUNTER — APPOINTMENT (OUTPATIENT)
Dept: PHYSICAL THERAPY | Facility: HOSPITAL | Age: 68
End: 2023-11-08
Attending: ORTHOPAEDIC SURGERY
Payer: COMMERCIAL

## 2023-11-08 LAB
HGB BLD-MCNC: 9.8 G/DL (ref 11.7–15.7)
HOLD SPECIMEN: NORMAL

## 2023-11-08 PROCEDURE — 999N000157 HC STATISTIC RCP TIME EA 10 MIN

## 2023-11-08 PROCEDURE — 85018 HEMOGLOBIN: CPT

## 2023-11-08 PROCEDURE — 250N000013 HC RX MED GY IP 250 OP 250 PS 637: Performed by: INTERNAL MEDICINE

## 2023-11-08 PROCEDURE — 36415 COLL VENOUS BLD VENIPUNCTURE: CPT

## 2023-11-08 PROCEDURE — 250N000013 HC RX MED GY IP 250 OP 250 PS 637: Performed by: ORTHOPAEDIC SURGERY

## 2023-11-08 PROCEDURE — 97530 THERAPEUTIC ACTIVITIES: CPT | Mod: GO

## 2023-11-08 PROCEDURE — 97530 THERAPEUTIC ACTIVITIES: CPT | Mod: GP,CQ

## 2023-11-08 PROCEDURE — 250N000013 HC RX MED GY IP 250 OP 250 PS 637

## 2023-11-08 PROCEDURE — 99231 SBSQ HOSP IP/OBS SF/LOW 25: CPT | Performed by: INTERNAL MEDICINE

## 2023-11-08 RX ORDER — ACETAMINOPHEN 325 MG/1
650 TABLET ORAL EVERY 4 HOURS PRN
Start: 2023-11-08

## 2023-11-08 RX ORDER — NALTREXONE HYDROCHLORIDE 50 MG/1
TABLET, FILM COATED ORAL
DISCHARGE
Start: 2023-11-08

## 2023-11-08 RX ORDER — MEMANTINE HYDROCHLORIDE 10 MG/1
10 TABLET ORAL 2 TIMES DAILY
Status: DISCONTINUED | OUTPATIENT
Start: 2023-11-08 | End: 2023-11-10 | Stop reason: HOSPADM

## 2023-11-08 RX ADMIN — POTASSIUM CHLORIDE 20 MEQ: 1500 TABLET, EXTENDED RELEASE ORAL at 09:49

## 2023-11-08 RX ADMIN — ASPIRIN 81 MG: 81 TABLET, COATED ORAL at 20:46

## 2023-11-08 RX ADMIN — ASPIRIN 81 MG: 81 TABLET, COATED ORAL at 09:49

## 2023-11-08 RX ADMIN — TRAZODONE HYDROCHLORIDE 100 MG: 50 TABLET ORAL at 20:47

## 2023-11-08 RX ADMIN — ARIPIPRAZOLE 5 MG: 5 TABLET ORAL at 09:49

## 2023-11-08 RX ADMIN — ACETAMINOPHEN 975 MG: 325 TABLET, FILM COATED ORAL at 21:07

## 2023-11-08 RX ADMIN — SERTRALINE HYDROCHLORIDE 200 MG: 50 TABLET ORAL at 09:49

## 2023-11-08 RX ADMIN — SENNOSIDES AND DOCUSATE SODIUM 1 TABLET: 50; 8.6 TABLET ORAL at 09:49

## 2023-11-08 RX ADMIN — OXYCODONE HYDROCHLORIDE 10 MG: 5 TABLET ORAL at 02:08

## 2023-11-08 RX ADMIN — POLYETHYLENE GLYCOL 3350 17 G: 17 POWDER, FOR SOLUTION ORAL at 09:49

## 2023-11-08 RX ADMIN — ACETAMINOPHEN 975 MG: 325 TABLET, FILM COATED ORAL at 14:42

## 2023-11-08 RX ADMIN — SIMVASTATIN 40 MG: 40 TABLET, FILM COATED ORAL at 20:47

## 2023-11-08 RX ADMIN — POTASSIUM CHLORIDE 20 MEQ: 1500 TABLET, EXTENDED RELEASE ORAL at 20:46

## 2023-11-08 RX ADMIN — MEMANTINE 10 MG: 10 TABLET ORAL at 09:54

## 2023-11-08 RX ADMIN — ACETAMINOPHEN 975 MG: 325 TABLET, FILM COATED ORAL at 06:24

## 2023-11-08 RX ADMIN — SENNOSIDES AND DOCUSATE SODIUM 1 TABLET: 50; 8.6 TABLET ORAL at 20:47

## 2023-11-08 RX ADMIN — ARIPIPRAZOLE 20 MG: 10 TABLET ORAL at 13:00

## 2023-11-08 RX ADMIN — OXYCODONE HYDROCHLORIDE 10 MG: 5 TABLET ORAL at 06:24

## 2023-11-08 RX ADMIN — MEMANTINE 10 MG: 10 TABLET ORAL at 20:48

## 2023-11-08 ASSESSMENT — ACTIVITIES OF DAILY LIVING (ADL)
ADLS_ACUITY_SCORE: 24
ADLS_ACUITY_SCORE: 25
ADLS_ACUITY_SCORE: 26
ADLS_ACUITY_SCORE: 24
ADLS_ACUITY_SCORE: 25
ADLS_ACUITY_SCORE: 26
ADLS_ACUITY_SCORE: 25

## 2023-11-08 NOTE — PROGRESS NOTES
"[facility]    Hospitalist Progress Note    Date of Service (when I saw the patient): 11/08/2023    Assessment & Plan   Dee Dee Shepherd is a 68 year old female who was admitted on 11/6/2023.     Status post elective left direct anterior hip total arthroplasty postoperative day #2:   -Postop cares and protocol per Ortho    Essential hypertension: BP and heart rate within normal limits and stable  -Continue chlorthalidone, lisinopril    Chronic kidney disease: Pre-op creatinine 0.81  -Monitor renal function and urine output    Depression: Continue Abilify, Zoloft, trazodone    History of alcohol abuse: Patient is on naltrexone as an outpatient    FEN: Oral diet as tolerated    Clinically Significant Risk Factors Present on Admission                  # Hypertension: Noted on problem list      # Overweight: Estimated body mass index is 28.87 kg/m  as calculated from the following:    Height as of this encounter: 1.6 m (5' 3\").    Weight as of this encounter: 73.9 kg (163 lb).              DVT Prophylaxis: Defer to primary service    Code Status: Full Code    Disposition: SNF placement, awaiting pre-authorization from insurance company    Rudy Kitchen MD, MD        Interval History   Patient seen in room.  Operative pain controlled.  Denies chest pain, shortness of breath, abdominal pain, nausea.  Otherwise doing well.    -Data reviewed today: I reviewed all new labs and imaging results over the last 24 hours. I personally reviewed imaging reports.    Physical Exam   Temp: 98  F (36.7  C) Temp src: Tympanic BP: 125/75 Pulse: 81   Resp: 16 SpO2: 97 % O2 Device: None (Room air)    Vitals:    11/06/23 0846   Weight: 73.9 kg (163 lb)     Vital Signs with Ranges  Temp:  [97.5  F (36.4  C)-98  F (36.7  C)] 98  F (36.7  C)  Pulse:  [72-81] 81  Resp:  [16] 16  BP: (103-125)/(46-75) 125/75  SpO2:  [94 %-99 %] 97 %      Intake/Output Summary (Last 24 hours) at 11/8/2023 0826  Last data filed at 11/8/2023 " 0600  Gross per 24 hour   Intake 1320 ml   Output 1875 ml   Net -555 ml         Peripheral IV 11/06/23 Right Hand (Active)   Site Assessment WDL 11/07/23 2050   Line Status Saline locked 11/07/23 2050   Dressing Transparent 11/07/23 2050   Dressing Status clean;dry;intact 11/07/23 2050   Line Intervention Flushed 11/07/23 2050   Phlebitis Scale 0-->no symptoms 11/07/23 2050   Infiltration? no 11/07/23 2050   Number of days: 2       Incision/Surgical Site 06/16/22 Right Hip (Active)   Number of days: 510       Incision/Surgical Site 11/06/23 Left Hip (Active)   Incision Assessment UTV 11/08/23 0214   Carissa-Incision Assessment UTV 11/08/23 0214   Closure SHAHNAZ 11/08/23 0214   Incision Drainage Amount UTV 11/08/23 0214   Drainage Description UTV 11/08/23 0214   Incision Care Ice applied 11/07/23 1752   Dressing Intervention Dressing marked - Extended 11/08/23 0214   Number of days: 2     No line/device    Constitutional - AA, NAD  HEENT - atraumatic, normocephalic  Neck - supple, no masses, no JVD  CVS - S1 S2 RRR, no murmurs, rubs, gallops  Respiratory - CTA b/l  GI - soft, NT, ND, + bowel sounds, no organomegaly  Musculoskeletal - no LE edema, no lesions  Neuro - oriented x 3, no gross focal deficits      Medications    lactated ringers Stopped (11/07/23 0353)      acetaminophen  975 mg Oral Q8H    ARIPiprazole  20 mg Oral Daily    ARIPiprazole  5 mg Oral Daily    aspirin  81 mg Oral BID    chlorthalidone  12.5 mg Oral Daily    lisinopril  10 mg Oral Daily    memantine  10 mg Oral BID    [Held by provider] naltrexone  50 mg Oral Daily with lunch    polyethylene glycol  17 g Oral Daily    potassium chloride ER  20 mEq Oral BID    senna-docusate  1 tablet Oral BID    sertraline  200 mg Oral Daily    simvastatin  40 mg Oral At Bedtime    sodium chloride (PF)  3 mL Intracatheter Q8H    traZODone  100 mg Oral At Bedtime       Data   Recent Labs   Lab 11/08/23  0526 11/07/23  0533 11/06/23  0910   HGB 9.8* 10.7*  --    GLC   --   --  132*     Lactic Acid   Date Value Ref Range Status   03/03/2023 0.9 0.7 - 2.0 mmol/L Final       No results found for this or any previous visit (from the past 24 hour(s)).      Rudy Kitchen MD

## 2023-11-08 NOTE — PLAN OF CARE
Pt A&O x 4. Ambulated in uriarte with staff today. Aquacell remains CDI to left hip. Dressing marked with no change. 10 mg oxycodone given x 1, and ice applied for pain management. Poor appetite. Naltrexone ordered by HCP but not to give while pt is still receiving oxycodone so it is held. IV SL. Pt makes needs known. Call light in reach. Awaiting rehab for placement.     Face to face report given with opportunity to observe patient.    Report given to Rosio Martel RN   11/7/2023  7:38 PM

## 2023-11-08 NOTE — PLAN OF CARE
Pt is A&O. VSS. C/O pain in left hip. Ice packs, scheduled tylenol, and oxycodone administered for pain control. Voiding without difficulty. No BM. Ambulated in the hallway x1. Able to make needs known. Call light remains within reach.           Face to face report given with opportunity to observe patient.    Report given to Rosio Coelho RN   11/8/2023  6:56 AM

## 2023-11-08 NOTE — PROGRESS NOTES
11/08/23 1000   Appointment Info   Signing Clinician's Name / Credentials (PT) Maricarmen Antony PTA   PT Assistant Visit Number 1   Physical Therapy Goals   PT Frequency 6x/week   PT Predicted Duration/Target Date for Goal Attainment 11/14/23   PT Goals Bed Mobility;Transfers;Gait   PT: Bed Mobility Independent   PT: Transfers Modified independent;Assistive device   PT: Gait Supervision/stand-by assist;Rolling walker;Greater than 200 feet   Interventions   Interventions Quick Adds Therapeutic Procedure;Therapeutic Activity   Therapeutic Activity   Therapeutic Activities: dynamic activities to improve functional performance Minutes (08223) 20   Symptoms Noted During/After Treatment Fatigue   Treatment Detail/Skilled Intervention Pt was in the recliner upon arrival but was willing to walk with PT this morning. Pt ambulated about 75 feet with FWW SBA1 able to re-direct the walker a couple of different times due to bumping into the door and doorway. Pt was stiff with the first few steps was limping and a step to gait pattern. After pt was moving more less limping and step through gait pattern. Pt needed cues to square up to the chair and touch the backs of her legs before reaching back for the armrests of the chair.   PT Discharge Planning   PT Plan Progress mobility as tolerated   PT Discharge Recommendation (DC Rec) Transitional Care Facility   PT Rationale for DC Rec Based on pt's current function she would not be safe to d/c home independently at this time. Recommending short term rehab stay. Plan to treat pt during her stay and monitor progress   PT Brief overview of current status SBA1 with sit to stands and ambulation with the FWW.   Total Session Time   Timed Code Treatment Minutes 20   Total Session Time (sum of timed and untimed services) 20

## 2023-11-08 NOTE — DISCHARGE SUMMARY
Date of Admission: 11/6/23    Date of Discharge: 11/9/23    Admitting Physician: Vernon Miles MD    Consultations: Hospitalist Service    Admitting Diagnosis: Left Hip Osteoarthritis    Discharge Diagnosis: Left Hip Osteoarthritis    Surgical Procedures Performed: Left Direct Anterior SREE    Hospital Complications: None    Discharge Medications: No Changes to home meds; Oxycodone 5-10 mg po every 4 hours added for pain control; ASA 81 BID for DVT prophylaxis    Discharge Disposition: Home    Discharge Diet: As at home    Discharge Activity: WBAT Left Lower Extremity; No Hip ROM precautions    Hospital Course:   The patient underwent a Left Direct Anterior SREE.  There were no intraoperative or immediate post operative complications.  Once stable in the PACU they were transferred to the hospital floor where they remained for the remainder of the hospital stay.  Pain management transitioned from IV to oral pain medications.  Physical/Occupational Therapy was consulted for early mobility and gait training as well as ADL training.  DVT prophylaxis was initiated on POD #1.  Vital signs and hemoglobin remained stable. The Hospitalist team was consulted for management of medical co-morbidities.  At time of discharge the patient was medically stable and cleared by Therapy for safe discharge to SNF.        Follow-Up: 10-14 days [facility]    Questions: Call [phone] or [phone]

## 2023-11-08 NOTE — PROGRESS NOTES
11/08/23 0800   Appointment Info   Signing Clinician's Name / Credentials (OT) Pita Figueroa OTR/L   Therapeutic Activities   Therapeutic Activity Minutes (98644) 15   Symptoms noted during/after treatment fatigue   Treatment Detail/Skilled Intervention Pt resting in bed upon OT arrival, agreeable to getting up into the chair. SpO2 on RA was 94-95%. She transferred supine to sit with South to move her L LE. Pt required maxA to adjust her socks. She then transferred sit to stand from the EOB with CGA. Pt ambulated to the door and then out in the hallway using FWW ~100 ft with CGA. Pt required 2 brief standing rest breaks during her walk. Pt returned to the chair with CGA. SpO2 improved to 97%. Pt able to scoot her buttocks back in the chair independently, too. Therapist attempted to help pt order breakfast but the kitchen phone was busy. Pt's unit assistant was updated and was going to order pt's breakfast. Pt was left resting in the chair with the call light within reach.   OT Discharge Planning   OT Plan Progress ADLs, activity tolerance, and strength as able   OT Discharge Recommendation (DC Rec) Transitional Care Facility   OT Rationale for DC Rec Pt previously living alone in an assisted living apartment. She was receiving assistance with IADLs such as meal preparation, medication management, home maintenance, and transportation. Pt continues to require assistance with ADLs and CGA for functional mobility, and does not have assistance with ADLs at home. Pt would benefit from STR upon discharge.   OT Brief overview of current status Pt resting in bed upon OT arrival, agreeable to getting up into the chair. SpO2 on RA was 94-95%. She transferred supine to sit with South to move her L LE. Pt required maxA to adjust her socks. She then transferred sit to stand from the EOB with CGA. Pt ambulated to the door and then out in the hallway using FWW ~100 ft with CGA. Pt required 2 brief standing rest breaks during her  walk. Pt returned to the chair with CGA. SpO2 improved to 97%. Pt able to scoot her buttocks back in the chair independently, too. Therapist attempted to help pt order breakfast but the kitchen phone was busy. Pt's unit assistant was updated and was going to order pt's breakfast. Pt was left resting in the chair with the call light within reach.   Total Session Time   Timed Code Treatment Minutes 15   Total Session Time (sum of timed and untimed services) 15

## 2023-11-08 NOTE — UTILIZATION REVIEW
Concurrent stay review; Secondary Review Determination - [facility]        Under the authority of the Utilization Management Committee, the utilization review process indicated a secondary review on the above patient.  The review outcome is based on review of the medical records, discussions with staff, and applying clinical experience noted on the date of the review.        (x) Observation/outpatient Status Appropriate - Concurrent stay review       RATIONALE FOR DETERMINATION:     The patient underwent a Left Direct Anterior SREE.  There were no intraoperative or immediate post operative complications.  Once stable in the PACU they were transferred to the hospital floor where they remained for the remainder of the hospital stay.  Pain management transitioned from IV to oral pain medications.  Physical/Occupational Therapy was consulted for early mobility and gait training as well as ADL training.  DVT prophylaxis was initiated on POD #1.  Vital signs and hemoglobin remained stable.  It is determined that patient requires transitional care on discharge.  Patient remains in the hospital pending placement.    Patient delayed discharge is related to disposition, there is no medical necessity for inpatient admission at the time of this review. If there is a change in patient status, please resend for review.    The information on this document is developed by the utilization review team in order for the business office to ensure compliance.  This only denotes the appropriateness of proper admission status and does not reflect the quality of care rendered.       The definitions of Inpatient Status and Observation Status used in making the determination above are those provided in the CMS Coverage Manual, Chapter 1 and Chapter 6, section 70.4.       Sincerely,    Cl Rocha MD

## 2023-11-09 ENCOUNTER — APPOINTMENT (OUTPATIENT)
Dept: PHYSICAL THERAPY | Facility: HOSPITAL | Age: 68
End: 2023-11-09
Attending: ORTHOPAEDIC SURGERY
Payer: COMMERCIAL

## 2023-11-09 ENCOUNTER — APPOINTMENT (OUTPATIENT)
Dept: OCCUPATIONAL THERAPY | Facility: HOSPITAL | Age: 68
End: 2023-11-09
Attending: ORTHOPAEDIC SURGERY
Payer: COMMERCIAL

## 2023-11-09 PROCEDURE — 99231 SBSQ HOSP IP/OBS SF/LOW 25: CPT | Performed by: INTERNAL MEDICINE

## 2023-11-09 PROCEDURE — 97530 THERAPEUTIC ACTIVITIES: CPT | Mod: GO

## 2023-11-09 PROCEDURE — 250N000013 HC RX MED GY IP 250 OP 250 PS 637: Performed by: ORTHOPAEDIC SURGERY

## 2023-11-09 PROCEDURE — 97530 THERAPEUTIC ACTIVITIES: CPT | Mod: GP

## 2023-11-09 PROCEDURE — 250N000013 HC RX MED GY IP 250 OP 250 PS 637

## 2023-11-09 RX ADMIN — SENNOSIDES AND DOCUSATE SODIUM 1 TABLET: 50; 8.6 TABLET ORAL at 08:38

## 2023-11-09 RX ADMIN — ACETAMINOPHEN 650 MG: 325 TABLET, FILM COATED ORAL at 17:09

## 2023-11-09 RX ADMIN — ASPIRIN 81 MG: 81 TABLET, COATED ORAL at 21:28

## 2023-11-09 RX ADMIN — ACETAMINOPHEN 975 MG: 325 TABLET, FILM COATED ORAL at 05:32

## 2023-11-09 RX ADMIN — POLYETHYLENE GLYCOL 3350 17 G: 17 POWDER, FOR SOLUTION ORAL at 08:38

## 2023-11-09 RX ADMIN — ASPIRIN 81 MG: 81 TABLET, COATED ORAL at 08:38

## 2023-11-09 RX ADMIN — TRAZODONE HYDROCHLORIDE 100 MG: 50 TABLET ORAL at 21:28

## 2023-11-09 RX ADMIN — POTASSIUM CHLORIDE 20 MEQ: 1500 TABLET, EXTENDED RELEASE ORAL at 21:28

## 2023-11-09 RX ADMIN — SERTRALINE HYDROCHLORIDE 200 MG: 50 TABLET ORAL at 12:25

## 2023-11-09 RX ADMIN — MEMANTINE 10 MG: 10 TABLET ORAL at 12:25

## 2023-11-09 RX ADMIN — MEMANTINE 10 MG: 10 TABLET ORAL at 21:28

## 2023-11-09 RX ADMIN — POTASSIUM CHLORIDE 20 MEQ: 1500 TABLET, EXTENDED RELEASE ORAL at 08:38

## 2023-11-09 RX ADMIN — ARIPIPRAZOLE 25 MG: 5 TABLET ORAL at 12:25

## 2023-11-09 RX ADMIN — SENNOSIDES AND DOCUSATE SODIUM 1 TABLET: 50; 8.6 TABLET ORAL at 21:28

## 2023-11-09 RX ADMIN — SIMVASTATIN 40 MG: 40 TABLET, FILM COATED ORAL at 21:28

## 2023-11-09 RX ADMIN — ACETAMINOPHEN 650 MG: 325 TABLET, FILM COATED ORAL at 21:27

## 2023-11-09 RX ADMIN — OXYCODONE HYDROCHLORIDE 5 MG: 5 TABLET ORAL at 17:09

## 2023-11-09 ASSESSMENT — ACTIVITIES OF DAILY LIVING (ADL)
ADLS_ACUITY_SCORE: 25
ADLS_ACUITY_SCORE: 24
ADLS_ACUITY_SCORE: 25
ADLS_ACUITY_SCORE: 25
ADLS_ACUITY_SCORE: 24
ADLS_ACUITY_SCORE: 25
ADLS_ACUITY_SCORE: 24
ADLS_ACUITY_SCORE: 24
ADLS_ACUITY_SCORE: 25
ADLS_ACUITY_SCORE: 24
ADLS_ACUITY_SCORE: 25
ADLS_ACUITY_SCORE: 25

## 2023-11-09 NOTE — PROVIDER NOTIFICATION
BP low this am 80-70s/40s both on monitor and manually. Pt asymptomatic at this time. Provider notified and BP meds held see MAR. Recheck was 100/43.

## 2023-11-09 NOTE — PLAN OF CARE
Pt has uneventful shift. Pain is being controlled with scheduled tylenol. When ice packs were offered pt decline, even with much encouragement from this writer to still be using them even though her pain is improving. Afebrile VSS, BP is soft but stable. Maintaining oxygen saturation on room air. Voiding adequately, no BM. IV in R wrist is saline locked. Aquacel on left him in intact and clean. Shadowing has not extended marked boarders. A1 with walker and gait belt. Did ambulate in the hallway x1 this shift. Call light remains within reach and pt uses it appropriately.    Face to face report given with opportunity to observe patient.    Report given to Rosio Coelho RN   11/9/2023  7:26 AM

## 2023-11-09 NOTE — PROGRESS NOTES
[facility] accepted pending completion of Level II from [place].  Additional information for Level II provided to Kandy Singh.  Awaiting response.

## 2023-11-09 NOTE — PLAN OF CARE
A&O, VSS, and afebrile. Rating pain in L hip at a 3-4/10, scheduled tylenol given oxy offered but pt declined. CMS to LLE intact, Aquacel dressing marked with no change. Ice applied to leg. Rest of assessments as charted. Ax1 with gait belt and walker. Encouraged IS, coughing and deep breathing. SL and regular diet. Remains free of injury, call light within reach, and calls appropriately. Face to face report given with opportunity to observe patient.    Report given to Rosio Goode RN   11/8/2023  7:05 PM

## 2023-11-09 NOTE — PROGRESS NOTES
"[facility]    Hospitalist Progress Note    Date of Service (when I saw the patient): 11/09/2023    Assessment & Plan   Dee Dee Shepherd is a 68 year old female who was admitted on 11/6/2023.     Status post elective left direct anterior hip total arthroplasty postoperative day #3:   -Postop cares and protocol per Ortho    Essential hypertension: BP and heart rate within normal limits and stable  -Continue chlorthalidone, lisinopril    Chronic kidney disease: Pre-op creatinine 0.81  -Monitor renal function and urine output    Depression: Continue Abilify, Zoloft, trazodone    History of alcohol abuse: Patient is on naltrexone as an outpatient    FEN: Oral diet as tolerated    Clinically Significant Risk Factors Present on Admission                  # Hypertension: Noted on problem list      # Overweight: Estimated body mass index is 28.87 kg/m  as calculated from the following:    Height as of this encounter: 1.6 m (5' 3\").    Weight as of this encounter: 73.9 kg (163 lb).              DVT Prophylaxis: Defer to primary service    Code Status: Full Code    Disposition: SNF placement, awaiting pre-authorization from insurance company    Rudy Kitchen MD, MD        Interval History   Patient seen in room.  Operative pain controlled.  Denies chest pain, shortness of breath, abdominal pain, nausea.  Otherwise doing well, no issues overnight.    -Data reviewed today: I reviewed all new labs and imaging results over the last 24 hours. I personally reviewed imaging reports.    Physical Exam   Temp: 97.9  F (36.6  C) Temp src: Tympanic BP: 101/52 Pulse: 79   Resp: 15 SpO2: 96 % O2 Device: None (Room air)    Vitals:    11/06/23 0846   Weight: 73.9 kg (163 lb)     Vital Signs with Ranges  Temp:  [97.5  F (36.4  C)-98  F (36.7  C)] 97.9  F (36.6  C)  Pulse:  [73-79] 79  Resp:  [14-18] 15  BP: ()/(34-52) 101/52  SpO2:  [95 %-98 %] 96 %      Intake/Output Summary (Last 24 hours) at 11/9/2023 0756  Last data " filed at 11/8/2023 2145  Gross per 24 hour   Intake 405 ml   Output 1325 ml   Net -920 ml         Peripheral IV 11/06/23 Right Hand (Active)   Site Assessment WDL 11/09/23 0530   Line Status Saline locked 11/09/23 0530   Dressing Transparent 11/09/23 0530   Dressing Status clean;dry;intact 11/09/23 0530   Line Intervention Flushed 11/09/23 0530   Phlebitis Scale 0-->no symptoms 11/09/23 0530   Infiltration? no 11/09/23 0530   Number of days: 3       Incision/Surgical Site 06/16/22 Right Hip (Active)   Number of days: 511       Incision/Surgical Site 11/06/23 Left Hip (Active)   Incision Assessment UTV 11/09/23 0530   Carissa-Incision Assessment UTV 11/09/23 0530   Closure SHAHNAZ 11/09/23 0530   Incision Drainage Amount UTV 11/09/23 0530   Drainage Description UTV 11/09/23 0530   Incision Care Ice applied 11/08/23 1712   Dressing Intervention Dressing marked - No change 11/09/23 0530   Number of days: 3     No line/device    Constitutional - AA, NAD  HEENT - atraumatic, normocephalic  Neck - supple, no masses, no JVD  CVS - S1 S2 RRR, no murmurs, rubs, gallops  Respiratory - CTA b/l  GI - soft, NT, ND, + bowel sounds, no organomegaly  Musculoskeletal - no LE edema, no lesions  Neuro - oriented x 3, no gross focal deficits      Medications    lactated ringers Stopped (11/07/23 0353)      ARIPiprazole  25 mg Oral Daily with lunch    aspirin  81 mg Oral BID    [Held by provider] chlorthalidone  12.5 mg Oral Daily    [Held by provider] lisinopril  10 mg Oral Daily    memantine  10 mg Oral BID    [Held by provider] naltrexone  50 mg Oral Daily with lunch    polyethylene glycol  17 g Oral Daily    potassium chloride ER  20 mEq Oral BID    senna-docusate  1 tablet Oral BID    sertraline  200 mg Oral Daily with lunch    simvastatin  40 mg Oral At Bedtime    sodium chloride (PF)  3 mL Intracatheter Q8H    traZODone  100 mg Oral At Bedtime       Data   Recent Labs   Lab 11/08/23 0526 11/07/23 0533 11/06/23 0910   HGB 9.8* 10.7*   --    GLC  --   --  132*     Lactic Acid   Date Value Ref Range Status   03/03/2023 0.9 0.7 - 2.0 mmol/L Final       No results found for this or any previous visit (from the past 24 hour(s)).      Rudy Kitchen MD

## 2023-11-09 NOTE — PROGRESS NOTES
11/09/23 1000   Appointment Info   Signing Clinician's Name / Credentials (OT) Pita Figueroa OTR/L   Therapeutic Activities   Therapeutic Activity Minutes (72144) 18   Symptoms noted during/after treatment fatigue;increased pain  (5-6/10 L hip pain with activity)   Treatment Detail/Skilled Intervention Pt sitting up in the chair upon OT arrival, agreeable to tx. She is eager to move around. Pt transferred sit<>stand from the chair with CGA. Pt ambulated to/from the BR using FWW with CGA. OT moved the commode overlay over the toilet (vs pt using it bedside/chair side) for more exercise/activity. Pt completed a commode/toilet transfer with CGA. She completed anne marie-cares independently. Pt then went to the sink and washed her hands with SBA-CGA. She declined brushing her teeth at that time. Pt then returned to the chair to don pull ups and a robe. Pt required assistance to don the pullups over her feet/ankles and then she was able to pull up over hips. Eric needed for the back to pull up completely. Pt then donned a robe in standing with modA. Pt wanting to ambulate in the hallway so she did for about 3-4 minutes using FWW with CGA (approx. 100 ft). Pt returned to the chair and was left resting with her feet up and the call light within reach. Ice applied to L hip.   OT Discharge Planning   OT Plan Progress ADLs, activity tolerance, and strength as able   OT Discharge Recommendation (DC Rec) Transitional Care Facility   OT Rationale for DC Rec Pt previously living alone in an assisted living apartment. She was receiving assistance with IADLs such as meal preparation, medication management, home maintenance, and transportation. Pt continues to require assistance with ADLs and CGA for functional mobility, and does not have assistance with ADLs at home. Pt would benefit from STR upon discharge.   OT Brief overview of current status Pt sitting up in the chair upon OT arrival, agreeable to tx. She is eager to move around. Pt  transferred sit<>stand from the chair with CGA. Pt ambulated to/from the BR using FWW with CGA. OT moved the commode overlay over the toilet (vs pt using it bedside/chair side) for more exercise/activity. Pt completed a commode/toilet transfer with CGA. She completed anne marie-cares independently. Pt then went to the sink and washed her hands with SBA-CGA. She declined brushing her teeth at that time. Pt then returned to the chair to don pull ups and a robe. Pt required assistance to don the pullups over her feet/ankles and then she was able to pull up over hips. Eric needed for the back to pull up completely. Pt then donned a robe in standing with modA. Pt wanting to ambulate in the hallway so she did for about 3-4 minutes using FWW with CGA (approx. 100 ft). Pt returned to the chair and was left resting with her feet up and the call light within reach. Ice applied to L hip.   Total Session Time   Timed Code Treatment Minutes 18   Total Session Time (sum of timed and untimed services) 18

## 2023-11-09 NOTE — PROGRESS NOTES
11/09/23 1400   Appointment Info   Signing Clinician's Name / Credentials (PT) Viviane Hagan DPT   Therapeutic Activity   Therapeutic Activities: dynamic activities to improve functional performance Minutes (75448) 15   Symptoms Noted During/After Treatment Fatigue   Treatment Detail/Skilled Intervention Pt resting in recliner at start of session and agreeable to PT. Completed sit to stand c FWW and CGA. Pt ambulated 150' c FWW and CGA. Needed a couple of standing rest breaks due to pain and fatigue. Ambulates with decreased sarai and occasional step-to gait pattern. CGA for assist back to recliner c assist for positional LE.  Reviewed quad/glute sets   PT Discharge Planning   PT Plan Progress mobility as tolerated   PT Discharge Recommendation (DC Rec) Transitional Care Facility   PT Rationale for DC Rec Based on pt's current function she would not be safe to d/c home independently at this time. Recommending short term rehab stay. Plan to treat pt during her stay and monitor progress   Total Session Time   Timed Code Treatment Minutes 15   Total Session Time (sum of timed and untimed services) 15

## 2023-11-09 NOTE — PROGRESS NOTES
Received update from Kandy Singh, Level II cleared.  Updated Kandy with Zack.  Updated Dee Dee and family.  Care team updated.      Ride with [facility] at 0830.

## 2023-11-10 VITALS
SYSTOLIC BLOOD PRESSURE: 107 MMHG | HEIGHT: 63 IN | WEIGHT: 163 LBS | RESPIRATION RATE: 16 BRPM | DIASTOLIC BLOOD PRESSURE: 64 MMHG | BODY MASS INDEX: 28.88 KG/M2 | HEART RATE: 90 BPM | TEMPERATURE: 97.6 F | OXYGEN SATURATION: 97 %

## 2023-11-10 PROCEDURE — 99231 SBSQ HOSP IP/OBS SF/LOW 25: CPT | Performed by: INTERNAL MEDICINE

## 2023-11-10 PROCEDURE — 250N000013 HC RX MED GY IP 250 OP 250 PS 637

## 2023-11-10 RX ADMIN — ACETAMINOPHEN 650 MG: 325 TABLET, FILM COATED ORAL at 07:43

## 2023-11-10 RX ADMIN — OXYCODONE HYDROCHLORIDE 10 MG: 5 TABLET ORAL at 07:42

## 2023-11-10 ASSESSMENT — ACTIVITIES OF DAILY LIVING (ADL)
ADLS_ACUITY_SCORE: 24

## 2023-11-10 NOTE — PLAN OF CARE
Occupational Therapy Discharge Summary    Reason for therapy discharge:    Discharged to transitional care facility.    Progress towards therapy goal(s). See goals on Care Plan in [facility] electronic health record for goal details.  Goals partially met.  Barriers to achieving goals:   discharge from facility.    Therapy recommendation(s):    Continued therapy is recommended.  Rationale/Recommendations:  Ongoing skilled OT services are recommended at the SNF to continue progressing pt's safety and independence in ADLs.    Goal Outcome Evaluation:

## 2023-11-10 NOTE — PLAN OF CARE
"/58 (BP Location: Right arm, Patient Position: Semi-Hutchison's, Cuff Size: Adult Regular)   Pulse 82   Temp 98.3  F (36.8  C) (Tympanic)   Resp 16   Ht 1.6 m (5' 3\")   Wt 73.9 kg (163 lb)   SpO2 94%   BMI 28.87 kg/m        A&O, VSS, and afebrile. Assessments as charted, no major changes. SL and regular diet. Pt has not had a BM since surgery per pt and not since being admitted. Provider notified and per provider pt needs to ambulate already on laxatives see MAR. Walked with PT and with staff, tolerating well. Denied pain all shift, at 1720 rating pain 5/10 PRN tylenol and oxycodone given see Mar with some relief per pt. Remains free of injury and call light within reach. Face to face report given with opportunity to observe patient.    Report given to Rosio Goode RN   11/9/2023  7:00 PM   "

## 2023-11-10 NOTE — PLAN OF CARE
Patient discharged at 8:47 AM via wheel chair accompanied by staff. Prescriptions sent to patients preferred pharmacy. All belongings sent with patient.     Belongings gathered and returned to patient.     Patient discharged to [facility].   Report called to Nursing Home: Blank    Surgical Patient   Surgical Procedures during stay: LTH  Did patient receive discharge instruction on wound care and recognition of infection symptoms? Yes    MISC  Follow up appointment made:  No - facility to make  Home medications returned to patient: N/A  Patient reports pain was well managed at discharge: Yes

## 2023-11-10 NOTE — PROGRESS NOTES
"[facility]    Hospitalist Progress Note    Date of Service (when I saw the patient): 11/10/2023    Assessment & Plan   Dee Dee Shepherd is a 68 year old female who was admitted on 11/6/2023.     Status post elective left direct anterior hip total arthroplasty postoperative day #4:   -Postop cares and protocol per Ortho    Essential hypertension: BP and heart rate within normal limits and stable  -Continue chlorthalidone, lisinopril    Chronic kidney disease: Pre-op creatinine 0.81  -Monitor renal function and urine output    Depression: Continue Abilify, Zoloft, trazodone    History of alcohol abuse: Patient is on naltrexone as an outpatient    FEN: Oral diet as tolerated    Clinically Significant Risk Factors Present on Admission                  # Hypertension: Noted on problem list      # Overweight: Estimated body mass index is 28.87 kg/m  as calculated from the following:    Height as of this encounter: 1.6 m (5' 3\").    Weight as of this encounter: 73.9 kg (163 lb).              DVT Prophylaxis: Defer to primary service    Code Status: Full Code    Disposition: Patient medically stable to discharge today.    Rudy Kitchen MD, MD        Interval History   Patient seen in room.  No issues overnight.    -Data reviewed today: I reviewed all new labs and imaging results over the last 24 hours. I personally reviewed imaging reports.    Physical Exam   Temp: 97.6  F (36.4  C) Temp src: Tympanic BP: 107/64 Pulse: 90   Resp: 16 SpO2: 97 % O2 Device: None (Room air)    Vitals:    11/06/23 0846   Weight: 73.9 kg (163 lb)     Vital Signs with Ranges  Temp:  [97.4  F (36.3  C)-98.3  F (36.8  C)] 97.6  F (36.4  C)  Pulse:  [69-90] 90  Resp:  [14-16] 16  BP: ()/(38-64) 107/64  SpO2:  [94 %-98 %] 97 %      Intake/Output Summary (Last 24 hours) at 11/10/2023 0807  Last data filed at 11/9/2023 2130  Gross per 24 hour   Intake 840 ml   Output 1000 ml   Net -160 ml         Incision/Surgical Site 06/16/22 " Right Hip (Active)   Number of days: 512       Incision/Surgical Site 11/06/23 Left Hip (Active)   Incision Assessment [facility] 11/09/23 2130   Carissa-Incision Assessment [facility] 11/09/23 2130   Closure SHAHNAZ 11/09/23 2130   Incision Drainage Amount [facility] 11/09/23 2130   Drainage Description [facility] 11/09/23 2130   Incision Care Ice applied 11/09/23 2130   Dressing Intervention Dressing marked - Extended 11/09/23 2130   Number of days: 4     No line/device    Constitutional - AA, NAD  HEENT - atraumatic, normocephalic  Neck - supple, no masses, no JVD  CVS - S1 S2 RRR, no murmurs, rubs, gallops  Respiratory - CTA b/l  GI - soft, NT, ND, + bowel sounds, no organomegaly  Musculoskeletal - no LE edema, no lesions  Neuro - oriented x 3, no gross focal deficits      Medications    lactated ringers Stopped (11/07/23 0353)      ARIPiprazole  25 mg Oral Daily with lunch    aspirin  81 mg Oral BID    [Held by provider] chlorthalidone  12.5 mg Oral Daily    [Held by provider] lisinopril  10 mg Oral Daily    memantine  10 mg Oral BID    [Held by provider] naltrexone  50 mg Oral Daily with lunch    polyethylene glycol  17 g Oral Daily    potassium chloride ER  20 mEq Oral BID    senna-docusate  1 tablet Oral BID    sertraline  200 mg Oral Daily with lunch    simvastatin  40 mg Oral At Bedtime    sodium chloride (PF)  3 mL Intracatheter Q8H    traZODone  100 mg Oral At Bedtime       Data   Recent Labs   Lab 11/08/23  0526 11/07/23  0533 11/06/23  0910   HGB 9.8* 10.7*  --    GLC  --   --  132*     Lactic Acid   Date Value Ref Range Status   03/03/2023 0.9 0.7 - 2.0 mmol/L Final       No results found for this or any previous visit (from the past 24 hour(s)).      Rudy Kitchen MD

## 2023-11-10 NOTE — PLAN OF CARE
Pt had uneventful shift. Pain is being controlled with tylenol and ice packs. Afebrile VSS, BP is soft but stable. Maintaining oxygen saturation on room air. Voiding adequately, no BM. IV in R wrist is saline locked. Aquacel on left hip is intact and clean. Shadowing has slightly extended marked boarders. A1 with walker and gait belt. Did ambulate in the hallway x1 this shift. Call light remains within reach and pt uses it appropriately.    Face to face report given with opportunity to observe patient.    Report given to Freddy Coelho RN   11/10/2023  6:52 AM

## 2023-11-10 NOTE — PLAN OF CARE
Physical Therapy Discharge Summary    Reason for therapy discharge:    Discharged to transitional care facility.    Progress towards therapy goal(s). See goals on Care Plan in [facility] electronic health record for goal details.  Goals not met.  Barriers to achieving goals:   discharge from facility.    Therapy recommendation(s):    Continued therapy is recommended.  Rationale/Recommendations:  to improve safety and independence with functional mobility and ADLs.    Goal Outcome Evaluation:

## 2023-11-13 ENCOUNTER — TELEPHONE (OUTPATIENT)
Dept: FAMILY MEDICINE | Facility: OTHER | Age: 68
End: 2023-11-13

## 2023-11-13 NOTE — TELEPHONE ENCOUNTER
Yanira from [facility] called just as an fyi that pt is at [facility] [place].

## 2023-11-17 ENCOUNTER — DOCUMENTATION ONLY (OUTPATIENT)
Dept: OTHER | Facility: CLINIC | Age: 68
End: 2023-11-17
Payer: COMMERCIAL

## 2023-12-08 DIAGNOSIS — F33.2 SEVERE EPISODE OF RECURRENT MAJOR DEPRESSIVE DISORDER, WITHOUT PSYCHOTIC FEATURES (H): ICD-10-CM

## 2023-12-12 RX ORDER — SERTRALINE HYDROCHLORIDE 100 MG/1
100 TABLET, FILM COATED ORAL DAILY
Qty: 28 TABLET | Refills: 0 | OUTPATIENT
Start: 2023-12-12

## 2023-12-12 NOTE — TELEPHONE ENCOUNTER
Dereje Brunner sent Rx request for the following:      Requested Prescriptions   Pending Prescriptions Disp Refills    sertraline (ZOLOFT) 100 MG tablet [Pharmacy Med Name: SERTRALINE 100mg TABLET] 28 tablet 0     Sig: TAKE 1 TABLET BY MOUTH ONCE DAILY       SSRIs Protocol Failed - 12/8/2023 10:35 AM     Last Prescription Date:   historical   Last Fill Qty/Refills:         , R-    Last Office Visit:                 Future Office visit:               Patient is established at [facility] with Dr. Moore. Patient has not seen Dr. Miller before.  Irma Garcia RN on 12/12/2023 at 11:04 AM

## 2024-04-04 ENCOUNTER — MEDICAL CORRESPONDENCE (OUTPATIENT)
Dept: HEALTH INFORMATION MANAGEMENT | Facility: HOSPITAL | Age: 69
End: 2024-04-04

## 2024-04-25 ENCOUNTER — LAB REQUISITION (OUTPATIENT)
Dept: LAB | Facility: HOSPITAL | Age: 69
End: 2024-04-25
Payer: COMMERCIAL

## 2024-04-25 DIAGNOSIS — F33.2 MAJOR DEPRESSIVE DISORDER, RECURRENT SEVERE WITHOUT PSYCHOTIC FEATURES (H): ICD-10-CM

## 2024-04-25 LAB
EST. AVERAGE GLUCOSE BLD GHB EST-MCNC: 111 MG/DL
HBA1C MFR BLD: 5.5 %
TSH SERPL DL<=0.005 MIU/L-ACNC: 2.55 UIU/ML (ref 0.3–4.2)

## 2024-04-25 PROCEDURE — 84443 ASSAY THYROID STIM HORMONE: CPT | Performed by: PHYSICIAN ASSISTANT

## 2024-04-25 PROCEDURE — 83036 HEMOGLOBIN GLYCOSYLATED A1C: CPT | Performed by: PHYSICIAN ASSISTANT

## 2024-08-29 ENCOUNTER — LAB REQUISITION (OUTPATIENT)
Dept: LAB | Facility: HOSPITAL | Age: 69
End: 2024-08-29
Payer: COMMERCIAL

## 2024-08-29 DIAGNOSIS — K59.09 OTHER CONSTIPATION: ICD-10-CM

## 2024-08-29 LAB
ALBUMIN SERPL BCG-MCNC: 3.9 G/DL (ref 3.5–5.2)
ALP SERPL-CCNC: 103 U/L (ref 40–150)
ALT SERPL W P-5'-P-CCNC: 18 U/L (ref 0–50)
ANION GAP SERPL CALCULATED.3IONS-SCNC: 10 MMOL/L (ref 7–15)
AST SERPL W P-5'-P-CCNC: 16 U/L (ref 0–45)
BILIRUB SERPL-MCNC: 0.2 MG/DL
BUN SERPL-MCNC: 26.8 MG/DL (ref 8–23)
CALCIUM SERPL-MCNC: 8.9 MG/DL (ref 8.8–10.4)
CHLORIDE SERPL-SCNC: 106 MMOL/L (ref 98–107)
CHOLEST SERPL-MCNC: 151 MG/DL
CREAT SERPL-MCNC: 0.8 MG/DL (ref 0.51–0.95)
EGFRCR SERPLBLD CKD-EPI 2021: 79 ML/MIN/1.73M2
ERYTHROCYTE [DISTWIDTH] IN BLOOD BY AUTOMATED COUNT: 12.4 % (ref 10–15)
FASTING STATUS PATIENT QL REPORTED: ABNORMAL
FASTING STATUS PATIENT QL REPORTED: NORMAL
GLUCOSE SERPL-MCNC: 109 MG/DL (ref 70–99)
HCO3 SERPL-SCNC: 25 MMOL/L (ref 22–29)
HCT VFR BLD AUTO: 37 % (ref 35–47)
HDLC SERPL-MCNC: 56 MG/DL
HGB BLD-MCNC: 12.5 G/DL (ref 11.7–15.7)
LDLC SERPL CALC-MCNC: 83 MG/DL
MCH RBC QN AUTO: 32.5 PG (ref 26.5–33)
MCHC RBC AUTO-ENTMCNC: 33.8 G/DL (ref 31.5–36.5)
MCV RBC AUTO: 96 FL (ref 78–100)
NONHDLC SERPL-MCNC: 95 MG/DL
PLATELET # BLD AUTO: 153 10E3/UL (ref 150–450)
POTASSIUM SERPL-SCNC: 4.2 MMOL/L (ref 3.4–5.3)
PROT SERPL-MCNC: 6 G/DL (ref 6.4–8.3)
RBC # BLD AUTO: 3.85 10E6/UL (ref 3.8–5.2)
SODIUM SERPL-SCNC: 141 MMOL/L (ref 135–145)
TRIGL SERPL-MCNC: 58 MG/DL
WBC # BLD AUTO: 4.4 10E3/UL (ref 4–11)

## 2024-08-29 PROCEDURE — 80061 LIPID PANEL: CPT | Performed by: PHYSICIAN ASSISTANT

## 2024-08-29 PROCEDURE — 85027 COMPLETE CBC AUTOMATED: CPT | Performed by: PHYSICIAN ASSISTANT

## 2024-08-29 PROCEDURE — 80053 COMPREHEN METABOLIC PANEL: CPT | Performed by: PHYSICIAN ASSISTANT

## 2024-10-19 ENCOUNTER — HEALTH MAINTENANCE LETTER (OUTPATIENT)
Age: 69
End: 2024-10-19

## 2024-12-22 ENCOUNTER — HEALTH MAINTENANCE LETTER (OUTPATIENT)
Age: 69
End: 2024-12-22

## 2025-02-03 NOTE — TELEPHONE ENCOUNTER
[facility] for patient. She is scheduled for a right hip IA injection on 2/4/2022 but was last injected on 11/27/2021. Calling to enquire if her symptoms have changed from her 11/2021 visit. In that case, re-evaluation would be advised. If she is looking for a repeat right IA corticosteroid injection is wanted, would not be able to offer that until after March 1, 2022. Jessica Abreu ATC     Accidental fall

## 2025-04-22 ENCOUNTER — TELEPHONE (OUTPATIENT)
Dept: FAMILY MEDICINE | Facility: CLINIC | Age: 70
End: 2025-04-22
Payer: COMMERCIAL

## 2025-04-22 NOTE — TELEPHONE ENCOUNTER
Patient Quality Outreach    Patient is due for the following:   Breast Cancer Screening - Mammogram  Depression  -  PHQ-9 needed  Physical Annual Wellness Visit      Topic Date Due    Flu Vaccine (1) 09/01/2024    COVID-19 Vaccine (7 - 2024-25 season) 09/01/2024       Action(s) Taken:   Patient moved and transferred care    Type of outreach:    Chart review performed, no outreach needed.    Questions for provider review:    None         Ludy Trevino CMA  Chart routed to None.

## 2025-04-23 DIAGNOSIS — Z79.899 HIGH RISK MEDICATION USE: Primary | ICD-10-CM

## 2025-05-02 ENCOUNTER — APPOINTMENT (OUTPATIENT)
Dept: LAB | Facility: HOSPITAL | Age: 70
End: 2025-05-02
Payer: COMMERCIAL

## 2025-05-02 ENCOUNTER — LAB REQUISITION (OUTPATIENT)
Dept: LAB | Facility: HOSPITAL | Age: 70
End: 2025-05-02
Payer: COMMERCIAL

## 2025-05-02 DIAGNOSIS — I12.9 HYPERTENSIVE CHRONIC KIDNEY DISEASE WITH STAGE 1 THROUGH STAGE 4 CHRONIC KIDNEY DISEASE, OR UNSPECIFIED CHRONIC KIDNEY DISEASE: ICD-10-CM

## 2025-05-02 LAB
ALBUMIN SERPL BCG-MCNC: 4 G/DL (ref 3.5–5.2)
ALP SERPL-CCNC: 143 U/L (ref 40–150)
ALT SERPL W P-5'-P-CCNC: 13 U/L (ref 0–50)
ANION GAP SERPL CALCULATED.3IONS-SCNC: 11 MMOL/L (ref 7–15)
AST SERPL W P-5'-P-CCNC: 17 U/L (ref 0–45)
BILIRUB SERPL-MCNC: 0.3 MG/DL
BUN SERPL-MCNC: 16.4 MG/DL (ref 8–23)
CALCIUM SERPL-MCNC: 9.2 MG/DL (ref 8.8–10.4)
CHLORIDE SERPL-SCNC: 105 MMOL/L (ref 98–107)
CHOLEST SERPL-MCNC: 173 MG/DL
CREAT SERPL-MCNC: 0.71 MG/DL (ref 0.51–0.95)
EGFRCR SERPLBLD CKD-EPI 2021: 90 ML/MIN/1.73M2
ERYTHROCYTE [DISTWIDTH] IN BLOOD BY AUTOMATED COUNT: 12.6 % (ref 10–15)
FASTING STATUS PATIENT QL REPORTED: NORMAL
GLUCOSE SERPL-MCNC: 168 MG/DL (ref 70–99)
HCO3 SERPL-SCNC: 24 MMOL/L (ref 22–29)
HCT VFR BLD AUTO: 39.1 % (ref 35–47)
HDLC SERPL-MCNC: 61 MG/DL
HGB BLD-MCNC: 13.3 G/DL (ref 11.7–15.7)
LDLC SERPL CALC-MCNC: 97 MG/DL
MCH RBC QN AUTO: 32.8 PG (ref 26.5–33)
MCHC RBC AUTO-ENTMCNC: 34 G/DL (ref 31.5–36.5)
MCV RBC AUTO: 96 FL (ref 78–100)
NONHDLC SERPL-MCNC: 112 MG/DL
PLATELET # BLD AUTO: 160 10E3/UL (ref 150–450)
POTASSIUM SERPL-SCNC: 4.3 MMOL/L (ref 3.4–5.3)
PROT SERPL-MCNC: 6.8 G/DL (ref 6.4–8.3)
RBC # BLD AUTO: 4.06 10E6/UL (ref 3.8–5.2)
SODIUM SERPL-SCNC: 140 MMOL/L (ref 135–145)
TRIGL SERPL-MCNC: 73 MG/DL
WBC # BLD AUTO: 5.2 10E3/UL (ref 4–11)

## 2025-05-02 PROCEDURE — 80061 LIPID PANEL: CPT | Performed by: PHYSICIAN ASSISTANT

## 2025-05-02 PROCEDURE — 80053 COMPREHEN METABOLIC PANEL: CPT | Performed by: PHYSICIAN ASSISTANT

## 2025-05-02 PROCEDURE — 85027 COMPLETE CBC AUTOMATED: CPT | Performed by: PHYSICIAN ASSISTANT

## (undated) DEVICE — LABEL STERILE PREPRINTED FOR OR FRRH01-2M

## (undated) DEVICE — GLOVE PROTEXIS W/NEU-THERA 6.5  2D73TE65

## (undated) DEVICE — CANISTER SUCTION MEDI-VAC GUARDIAN 2000ML 90D 65651-220

## (undated) DEVICE — SYR 20ML LL W/O NDL

## (undated) DEVICE — BONE WAX 2.5GM W31G

## (undated) DEVICE — BONE CLEANING TIP INTERPULSE  0210-010-000

## (undated) DEVICE — COVER LT HANDLE 2/PK 5160-2FG

## (undated) DEVICE — DRAPE IOBAN ISOLATION VERTICAL 320X21CM 6617

## (undated) DEVICE — PACK DIRECT ANTERIOR HIP SOPCNAHMB4

## (undated) DEVICE — SUTURE VICRYL+ 2-0 27IN CT-1 UND VCP259H

## (undated) DEVICE — EYE PREP BETADINE 5% SOLUTION 30ML 0065-0411-30

## (undated) DEVICE — STRATAFIX 1 SYMMETRIC PDS 24" DYED W/40MM 1/2 CIRC SXPP1A444

## (undated) DEVICE — ESU GROUND PAD ADULT W/CORD E7507

## (undated) DEVICE — SU PDO 1 STRATAFIX 36X36CM CTX TAPERPOINT SXPD2B405

## (undated) DEVICE — SUCTION MANIFOLD NEPTUNE 2 SYS 4 PORT 0702-020-000

## (undated) DEVICE — KIT PATIENT CARE HANA TABLE PROFX SUPINE 6855

## (undated) DEVICE — SOL NACL 0.9% IRRIG 3000ML BAG 2B7477

## (undated) DEVICE — SU MONOCRYL 2-0 CT-1 36" UND Y945H

## (undated) DEVICE — DRAPE IOBAN INCISE 13X13" 6640EZ

## (undated) DEVICE — GLOVE PROTEXIS BLUE W/NEU-THERA 6.5  2D73EB65

## (undated) DEVICE — NDL 18GA 1.5" 305196

## (undated) DEVICE — SU ETHIBOND 5 V-37 4X30" MB66G

## (undated) DEVICE — DEVICE RETRIEVER HEWSON 71111579

## (undated) DEVICE — PACK HIP LAKES WITH POUCH

## (undated) DEVICE — PREP CHLORAPREP 26ML TINTED HI-LITE ORANGE 930815

## (undated) DEVICE — SU DERMABOND ADVANCED .7ML DNX12

## (undated) DEVICE — SLEEVE SCD EXPRESS KNEE LENGTH MED 9529

## (undated) DEVICE — PREP CHLORAPREP 26ML TINTED ORANGE  260815

## (undated) DEVICE — GOWN XLG DISP 9545

## (undated) DEVICE — SUCTION IRR SYSTEM W/TIP INTERPULSE

## (undated) DEVICE — TAPE DURAPORE 2"X1.5YD SILK 1538S-2

## (undated) DEVICE — SOL NACL 0.9% IRRIG 3000ML BAG 07972-08

## (undated) DEVICE — SOL WATER IRRIG 1000ML BOTTLE 07139-09

## (undated) DEVICE — SU VICRYL 1 CT-1 36" UND J947H

## (undated) DEVICE — HOOD STRYKER FOR ORTHO 0408-800-400

## (undated) DEVICE — DECANTER VIAL 2006S

## (undated) DEVICE — SU MONOCRYL 3-0 PS-1 27" Y936H

## (undated) DEVICE — SU VICRYL 1 CP-1 27" J468H

## (undated) DEVICE — GOWN IMPERVIOUS SPECIALTY XLG/XLONG 32474

## (undated) DEVICE — SU VICRYL 1 CT-1 27" UND J261H

## (undated) DEVICE — PACK BASIN SET UP SUTCNBSBBA

## (undated) DEVICE — TAPE MICROFOAM 4" 1528-4

## (undated) DEVICE — GLOVE PROTEXIS W/NEU-THERA 8.0  2D73TE80

## (undated) DEVICE — BLADE SAW SAGITTAL STRK 18X90X1.37MM HD SYS 6 6118-137-090

## (undated) DEVICE — STOCKING SLEEVE COMPRESSION CALF LG

## (undated) DEVICE — DRSG AQUACEL AG 3.5X9.75" HYDROFIBER 412011

## (undated) DEVICE — SOL NACL 0.9% IRRIG 1000ML BOTTLE 2F7124

## (undated) DEVICE — DRSG AQUACEL AG HYDROFIBER  3.5X10" 422605

## (undated) DEVICE — DRILL BIT FLEXIBLE REFLECTION 25MM  71362925

## (undated) DEVICE — CLEANSER JET LAVAGE IRRISEPT 0.05% CHG IRRISEPT45USA

## (undated) DEVICE — BLADE SAGITTAL 18MM X 90MM X 1.27MM BR4118-127-090

## (undated) DEVICE — ESU PENCIL SMOKE EVAC W/ROCKER SWITCH 0703-047-000

## (undated) DEVICE — DRAPE SHEET REV FOLD 3/4 9349

## (undated) DEVICE — SOL WATER IRRIG 1000ML BOTTLE 2F7114

## (undated) RX ORDER — CEFAZOLIN SODIUM 1 G/3ML
INJECTION, POWDER, FOR SOLUTION INTRAMUSCULAR; INTRAVENOUS
Status: DISPENSED
Start: 2023-11-06

## (undated) RX ORDER — ACETAMINOPHEN 325 MG/1
TABLET ORAL
Status: DISPENSED
Start: 2022-06-16

## (undated) RX ORDER — FENTANYL CITRATE-0.9 % NACL/PF 10 MCG/ML
PLASTIC BAG, INJECTION (ML) INTRAVENOUS
Status: DISPENSED
Start: 2022-06-16

## (undated) RX ORDER — TRANEXAMIC ACID 650 MG/1
TABLET ORAL
Status: DISPENSED
Start: 2022-06-16

## (undated) RX ORDER — PROPOFOL 10 MG/ML
INJECTION, EMULSION INTRAVENOUS
Status: DISPENSED
Start: 2023-11-06

## (undated) RX ORDER — LIDOCAINE HYDROCHLORIDE 10 MG/ML
INJECTION, SOLUTION EPIDURAL; INFILTRATION; INTRACAUDAL; PERINEURAL
Status: DISPENSED
Start: 2021-05-03

## (undated) RX ORDER — FENTANYL CITRATE 50 UG/ML
INJECTION, SOLUTION INTRAMUSCULAR; INTRAVENOUS
Status: DISPENSED
Start: 2023-11-06

## (undated) RX ORDER — ONDANSETRON 2 MG/ML
INJECTION INTRAMUSCULAR; INTRAVENOUS
Status: DISPENSED
Start: 2022-06-16

## (undated) RX ORDER — EPHEDRINE SULFATE 50 MG/ML
INJECTION, SOLUTION INTRAMUSCULAR; INTRAVENOUS; SUBCUTANEOUS
Status: DISPENSED
Start: 2022-06-16

## (undated) RX ORDER — PROPOFOL 10 MG/ML
INJECTION, EMULSION INTRAVENOUS
Status: DISPENSED
Start: 2022-06-16

## (undated) RX ORDER — DEXAMETHASONE SODIUM PHOSPHATE 4 MG/ML
INJECTION, SOLUTION INTRA-ARTICULAR; INTRALESIONAL; INTRAMUSCULAR; INTRAVENOUS; SOFT TISSUE
Status: DISPENSED
Start: 2022-06-16